# Patient Record
Sex: FEMALE | Race: BLACK OR AFRICAN AMERICAN | Employment: OTHER | URBAN - METROPOLITAN AREA
[De-identification: names, ages, dates, MRNs, and addresses within clinical notes are randomized per-mention and may not be internally consistent; named-entity substitution may affect disease eponyms.]

---

## 2017-06-30 RX ORDER — HYDROCHLOROTHIAZIDE 25 MG/1
TABLET ORAL
Qty: 30 TABLET | Refills: 3 | OUTPATIENT
Start: 2017-06-30

## 2017-07-02 ENCOUNTER — APPOINTMENT (OUTPATIENT)
Dept: GENERAL RADIOLOGY | Age: 77
End: 2017-07-02
Payer: MEDICARE

## 2017-07-02 ENCOUNTER — HOSPITAL ENCOUNTER (EMERGENCY)
Age: 77
Discharge: HOME | End: 2017-07-02
Attending: EMERGENCY MEDICINE
Payer: MEDICARE

## 2017-07-02 VITALS
HEIGHT: 64 IN | OXYGEN SATURATION: 97 % | RESPIRATION RATE: 18 BRPM | TEMPERATURE: 97 F | BODY MASS INDEX: 31.76 KG/M2 | SYSTOLIC BLOOD PRESSURE: 139 MMHG | HEART RATE: 74 BPM | DIASTOLIC BLOOD PRESSURE: 82 MMHG | WEIGHT: 186 LBS

## 2017-07-02 DIAGNOSIS — M62.838 MUSCLE SPASM: Primary | ICD-10-CM

## 2017-07-02 PROCEDURE — 99283 EMERGENCY DEPT VISIT LOW MDM: CPT

## 2017-07-02 PROCEDURE — 93005 ELECTROCARDIOGRAM TRACING: CPT

## 2017-07-02 PROCEDURE — 20552 NJX 1/MLT TRIGGER POINT 1/2: CPT

## 2017-07-02 PROCEDURE — 6370000000 HC RX 637 (ALT 250 FOR IP): Performed by: EMERGENCY MEDICINE

## 2017-07-02 PROCEDURE — 71020 XR CHEST STANDARD TWO VW: CPT

## 2017-07-02 RX ORDER — DIAZEPAM 5 MG/1
5 TABLET ORAL ONCE
Status: COMPLETED | OUTPATIENT
Start: 2017-07-02 | End: 2017-07-02

## 2017-07-02 RX ORDER — DIAZEPAM 5 MG/1
5 TABLET ORAL EVERY 8 HOURS PRN
Qty: 4 TABLET | Refills: 0 | Status: SHIPPED | OUTPATIENT
Start: 2017-07-02 | End: 2017-10-06

## 2017-07-02 RX ORDER — LIDOCAINE HYDROCHLORIDE 10 MG/ML
20 INJECTION, SOLUTION INFILTRATION; PERINEURAL ONCE
Status: DISCONTINUED | OUTPATIENT
Start: 2017-07-02 | End: 2017-07-02 | Stop reason: HOSPADM

## 2017-07-02 RX ADMIN — DIAZEPAM 5 MG: 5 TABLET ORAL at 12:53

## 2017-07-02 ASSESSMENT — ENCOUNTER SYMPTOMS
COUGH: 0
SHORTNESS OF BREATH: 0
WHEEZING: 0
NAUSEA: 0
SORE THROAT: 0
DIARRHEA: 0
RHINORRHEA: 0
VOMITING: 0
BACK PAIN: 1
CONSTIPATION: 0
ABDOMINAL DISTENTION: 0

## 2017-07-02 ASSESSMENT — PAIN SCALES - GENERAL: PAINLEVEL_OUTOF10: 5

## 2017-07-02 ASSESSMENT — PAIN DESCRIPTION - ORIENTATION: ORIENTATION: RIGHT;UPPER

## 2017-07-02 ASSESSMENT — PAIN DESCRIPTION - PAIN TYPE: TYPE: ACUTE PAIN

## 2017-07-02 ASSESSMENT — PAIN DESCRIPTION - LOCATION: LOCATION: BACK

## 2017-07-02 ASSESSMENT — PAIN DESCRIPTION - DESCRIPTORS: DESCRIPTORS: SPASM

## 2017-07-02 ASSESSMENT — PAIN DESCRIPTION - FREQUENCY: FREQUENCY: CONTINUOUS

## 2017-07-11 LAB
EKG ATRIAL RATE: 88 BPM
EKG P AXIS: 53 DEGREES
EKG P-R INTERVAL: 146 MS
EKG Q-T INTERVAL: 382 MS
EKG QRS DURATION: 72 MS
EKG QTC CALCULATION (BAZETT): 462 MS
EKG R AXIS: -11 DEGREES
EKG T AXIS: 45 DEGREES
EKG VENTRICULAR RATE: 88 BPM

## 2017-10-06 ENCOUNTER — OFFICE VISIT (OUTPATIENT)
Dept: INTERNAL MEDICINE | Age: 77
End: 2017-10-06
Payer: MEDICARE

## 2017-10-06 ENCOUNTER — HOSPITAL ENCOUNTER (OUTPATIENT)
Age: 77
Setting detail: SPECIMEN
Discharge: HOME | End: 2017-10-06
Payer: MEDICARE

## 2017-10-06 VITALS
HEIGHT: 64 IN | WEIGHT: 201 LBS | DIASTOLIC BLOOD PRESSURE: 74 MMHG | BODY MASS INDEX: 34.31 KG/M2 | SYSTOLIC BLOOD PRESSURE: 161 MMHG | HEART RATE: 80 BPM

## 2017-10-06 DIAGNOSIS — E11.00 TYPE 2 DIABETES MELLITUS WITH HYPEROSMOLARITY NOT AT GOAL (HCC): Chronic | ICD-10-CM

## 2017-10-06 DIAGNOSIS — E11.00 TYPE 2 DIABETES MELLITUS WITH HYPEROSMOLARITY NOT AT GOAL (HCC): Primary | Chronic | ICD-10-CM

## 2017-10-06 DIAGNOSIS — Z13.820 OSTEOPOROSIS SCREENING: ICD-10-CM

## 2017-10-06 DIAGNOSIS — I15.9 SECONDARY HYPERTENSION: ICD-10-CM

## 2017-10-06 LAB
CHOLESTEROL/HDL RATIO: 4.5
CHOLESTEROL: 196 MG/DL
HBA1C MFR BLD: 8.3 %
HDLC SERPL-MCNC: 44 MG/DL
LDL CHOLESTEROL: 128 MG/DL (ref 0–130)
TRIGL SERPL-MCNC: 121 MG/DL
VLDLC SERPL CALC-MCNC: NORMAL MG/DL (ref 1–30)

## 2017-10-06 PROCEDURE — 83036 HEMOGLOBIN GLYCOSYLATED A1C: CPT | Performed by: INTERNAL MEDICINE

## 2017-10-06 PROCEDURE — 36415 COLL VENOUS BLD VENIPUNCTURE: CPT

## 2017-10-06 PROCEDURE — 80061 LIPID PANEL: CPT

## 2017-10-06 PROCEDURE — 99214 OFFICE O/P EST MOD 30 MIN: CPT | Performed by: INTERNAL MEDICINE

## 2017-10-06 ASSESSMENT — PATIENT HEALTH QUESTIONNAIRE - PHQ9
SUM OF ALL RESPONSES TO PHQ QUESTIONS 1-9: 0
2. FEELING DOWN, DEPRESSED OR HOPELESS: 0
10. IF YOU CHECKED OFF ANY PROBLEMS, HOW DIFFICULT HAVE THESE PROBLEMS MADE IT FOR YOU TO DO YOUR WORK, TAKE CARE OF THINGS AT HOME, OR GET ALONG WITH OTHER PEOPLE: 0
9. THOUGHTS THAT YOU WOULD BE BETTER OFF DEAD, OR OF HURTING YOURSELF: 0
SUM OF ALL RESPONSES TO PHQ9 QUESTIONS 1 & 2: 0
7. TROUBLE CONCENTRATING ON THINGS, SUCH AS READING THE NEWSPAPER OR WATCHING TELEVISION: 0
6. FEELING BAD ABOUT YOURSELF - OR THAT YOU ARE A FAILURE OR HAVE LET YOURSELF OR YOUR FAMILY DOWN: 0
3. TROUBLE FALLING OR STAYING ASLEEP: 0
8. MOVING OR SPEAKING SO SLOWLY THAT OTHER PEOPLE COULD HAVE NOTICED. OR THE OPPOSITE, BEING SO FIGETY OR RESTLESS THAT YOU HAVE BEEN MOVING AROUND A LOT MORE THAN USUAL: 0
5. POOR APPETITE OR OVEREATING: 0
4. FEELING TIRED OR HAVING LITTLE ENERGY: 0
1. LITTLE INTEREST OR PLEASURE IN DOING THINGS: 0

## 2017-10-06 NOTE — MR AVS SNAPSHOT
After Visit Summary             Torie Jeong   10/6/2017 9:20 AM   Office Visit    Description:  Female : 1940   Provider:  Chance Wahl MD   Department:  [facility] and Future Appointments         Below is a list of your follow-up and future appointments. This may not be a complete list as you may have made appointments directly with providers that we are not aware of or your providers may have made some for you. Please call your providers to confirm appointments. It is important to keep your appointments. Please bring your current insurance card, photo ID, co-pay, and all medication bottles to your appointment. If self-pay, payment is expected at the time of service. Your To-Do List     Future Appointments Provider Department Dept Phone    12/15/2017 8:55 AM MD MICHELLE Mcconnell 41 [phone]    Please arrive 15 minutes prior to appointment time, bring insurance card and photo ID. Future Orders Complete By Expires    DEXA BONE DENSITY 2 SITES [00261 Custom]  2017 10/6/2018    Lipid Panel [LAB18 Custom]  2017 10/6/2018    Follow-Up    Return in about 4 weeks (around 11/3/2017). Information from Your Visit        Department     Name Address Phone Fax    MICHELLE Hernandez [address] [facility] [phone]      You Were Seen for:         Comments    Type 2 diabetes mellitus with hyperosmolarity not at goal [facility])   [605398]         Vital Signs     Blood Pressure Pulse Height Weight Body Mass Index Smoking Status    161/74 (Site: Left Arm, Position: Sitting, Cuff Size: Large Adult) 80 5' 4\" (1.626 m) 201 lb (91.2 kg) 34.5 kg/m2 Never Smoker      Additional Information about your Body Mass Index (BMI)           Your BMI as listed above is considered obese (30 or more).  BMI is an estimate of body fat, calculated from your height and weight. The higher your BMI, the greater your risk of heart disease, high blood pressure, type 2 diabetes, stroke, gallstones, arthritis, sleep apnea, and certain cancers. BMI is not perfect. It may overestimate body fat in athletes and people who are more muscular. Even a small weight loss (between 5 and 10 percent of your current weight) by decreasing your calorie intake and becoming more physically active will help lower your risk of developing or worsening diseases associated with obesity. Learn more at: [employer]          Instructions    TESTING INSTRUCTIONS    Your doctor has ordered a/an DEXA for you, this will be done at any [facility] location of your choice    You will be called by the Scheduling Department to schedule your testing. If you do not hear from them within a week, please call them at [phone] to schedule the appointment. On the day of your appointment, please report to the Admitting Department, located on the main floor of the medical center behind the information desk. If you cannot keep this appointment, please call [phone] to cancel and reschedule your appointment. LABORATORY INSTRUCTIONS    Your doctor has ordered blood or urine testing. You can get this testing done at the Lab located on the first floor of the [facility], or at any other [facility]. Please stop at Main Registration, before going to the lab, as you must be registered first.     Please get this lab done before your next visit. You may eat or drink before this test.    Follow-up appointment scheduled for 12/15/17, AVS given to patient. It is very important that you keep your appointments, please contact us if you are unable to keep your scheduled appointment.  Thank you bp              Today's Medication Changes          These changes are accurate as of: 10/6/17 10:09 AM.  If you have any Yearly Flu Vaccine (1) 9/1/2017            MyChart Signup           [facility] allows you to send messages to your doctor, view your test results, renew your prescriptions, schedule appointments, view visit notes, and more. How Do I Sign Up? 1. In your Internet browser, go to [email or web address]  2. Click on the Sign Up Now link in the Sign In box. You will see the New Member Sign Up page. 3. Enter your [facility] [employer] Access Code exactly as it appears below. You will not need to use this code after youve completed the sign-up process. If you do not sign up before the expiration date, you must request a new code. [employer] Access Code: [email or web address]  Expires: 12/5/2017 10:09 AM    4. Enter your Social Security Number ([id number]) and Date of Birth (mm/dd/yyyy) as indicated and click Submit. You will be taken to the next sign-up page. 5. Create a [employer] ID. This will be your [employer] login ID and cannot be changed, so think of one that is secure and easy to remember. 6. Create a [employer] password. You can change your password at any time. 7. Enter your Password Reset Question and Answer. This can be used at a later time if you forget your password. 8. Enter your e-mail address. You will receive e-mail notification when new information is available in [address] 9. Click Sign Up. You can now view your medical record. Additional Information  If you have questions, please contact the physician practice where you receive care. Remember, [employer] is NOT to be used for urgent needs. For medical emergencies, dial 911. For questions regarding your [employer] account call [phone]. If you have a clinical question, please call your doctor's office.

## 2017-10-06 NOTE — PROGRESS NOTES
Visit Information    Have you changed or started any medications since your last visit including any over-the-counter medicines, vitamins, or herbal medicines? no   Are you having any side effects from any of your medications? -  no  Have you stopped taking any of your medications? Is so, why? -  no    Have you seen any other physician or provider since your last visit? No  Have you had any other diagnostic tests since your last visit? No  Have you been seen in the emergency room and/or had an admission to a hospital since we last saw you? No  Have you had your routine dental cleaning in the past 6 months? no    Have you activated your [employer] account? If not, what are your barriers?  Yes     Patient Care Team:  Jason Loving MD as PCP - General (Family Medicine)    Medical History Review  Past Medical, Family, and Social History reviewed and does contribute to the patient presenting condition    Health Maintenance   Topic Date Due    DTaP/Tdap/Td vaccine (1 - Tdap) 05/26/1957    Lipid screen  05/26/1978    Zostavax vaccine  05/26/1998    DEXA (modify frequency per FRAX score)  05/26/2003    Pneumococcal low/med risk (1 of 2 - PCV13) 05/26/2003    Flu vaccine (1) 09/01/2017

## 2017-10-06 NOTE — PROGRESS NOTES
Attending Physician Statement  I have discussed the care of Sindy Walls, including pertinent history and exam findings with the resident. I have reviewed the key elements of all parts of the encounter with the resident. I have seen and examined the patient with the resident and the key elements of all parts of the encounter have been performed by me. Added history includes DM, HTN- not sure of meds. Changing PCP today. I agree with the assessment, and status of the problem list as documented. 1. Type 2 diabetes mellitus with hyperosmolarity not at goal [facility])  Lipid Panel    POCT glycosylated hemoglobin (Hb A1C)   2. Secondary hypertension     3. Osteoporosis screening  DEXA BONE DENSITY 2 SITES   ' The plan and orders should include   Orders Placed This Encounter   Procedures    POCT glycosylated hemoglobin (Hb A1C)    and this was also documented by the resident. The medication list was reviewed with the resident and is up to date. The return visit should be in 1 month .     Otf Hinds MD, [address] [place] [address] Internal Medicine Associate & [facility]  Associate  Department of Internal Medicine  Internal Medicine Clerkship - Arsenio Loo  10/6/2017, 9:59 AM

## 2017-10-06 NOTE — PATIENT INSTRUCTIONS
TESTING INSTRUCTIONS    Your doctor has ordered a/an DEXA for you, this will be done at any [address] location of your choice    You will be called by the Scheduling Department to schedule your testing. If you do not hear from them within a week, please call them at [phone] to schedule the appointment. On the day of your appointment, please report to the Admitting Department, located on the main floor of the medical center behind the information desk. If you cannot keep this appointment, please call [phone] to cancel and reschedule your appointment. LABORATORY INSTRUCTIONS    Your doctor has ordered blood or urine testing. You can get this testing done at the Lab located on the first floor of the [facility], or at any other [facility]. Please stop at Main Registration, before going to the lab, as you must be registered first.     Please get this lab done before your next visit. You may eat or drink before this test.    Follow-up appointment scheduled for 12/15/17, AVS given to patient. It is very important that you keep your appointments, please contact us if you are unable to keep your scheduled appointment.  Thank you bp

## 2017-10-06 NOTE — PROGRESS NOTES
[facility]    New Patient Note/History and Physical    Date of patient's visit: 10/6/2017    Name:  Mitali Belcher      YOB: 1940    Patient Care Team:  Aurelia Simmons MD as PCP - General (Family Medicine)    REASON FOR VISIT: First Visit, establish care     HISTORY OF PRESENTING ILLNESS:      History was obtained from the patient. Mitali Belcher is a 76 y.o. is here to establish care. Previous PCP - dr Saul Rice at [place] [address] in insurance, needs to re-establish care  Being treated HTN for DM2  States she is on 4 pills, unsure of exact dose and which meds    HTN   - elevated 160s, 140s  - on lisinopril 10, hctz 25, norvasc 10? - pt unsure of exact medcations    DM2  - monitors glucose every AM - runs 150s  - last a1c - 11.2 from 9/2016  - on metformin 500 bid  - a1c this visit 8.3    Health care maintenance  - had flu shot (september)  - states she had pna vaccine previously  - had shingles vaccine - 6 years ago       aspirin 81 mg Take 81 mg by mouth daily.  hydroCHLOROthiazide (HYDRODIURIL) 25 mg    lisinopril (PRINIVIL,ZESTRIL) 10 mg    metFORMIN (GLUCOPHAGE) 500 mg tablet BID  - norvasc 10 mg          PAST MEDICAL AND SURGICAL HISTORY:          Diagnosis Date    Arthritis     Diabetes mellitus ([address]     Hypertension     Hypokalemia with normal acid-base balance 9/13/2016    Type 2 diabetes mellitus with hyperosmolarity not at goal [facility]) 9/13/2016           Procedure Laterality Date    HYSTERECTOMY         SOCIAL HISTORY:    TOBACCO:   reports that she has never smoked. She has never used smokeless tobacco.  ETOH:   reports that she does not drink alcohol. DRUGS:  reports that she does not use illicit drugs.   OCCUPATION:      ALLERGIES:      Allergies   Allergen Reactions    Codeine          HOME MEDICATION:      Current Outpatient Prescriptions on File Prior to Visit   Medication Sig Dispense Refill    aspirin 81 MG tablet Take 81 mg

## 2017-10-17 ENCOUNTER — APPOINTMENT (OUTPATIENT)
Dept: CT IMAGING | Age: 77
End: 2017-10-17
Payer: COMMERCIAL

## 2017-10-17 ENCOUNTER — APPOINTMENT (OUTPATIENT)
Dept: GENERAL RADIOLOGY | Age: 77
End: 2017-10-17
Payer: COMMERCIAL

## 2017-10-17 ENCOUNTER — HOSPITAL ENCOUNTER (EMERGENCY)
Age: 77
Discharge: HOME | End: 2017-10-17
Attending: EMERGENCY MEDICINE
Payer: COMMERCIAL

## 2017-10-17 VITALS
HEART RATE: 87 BPM | BODY MASS INDEX: 34.5 KG/M2 | SYSTOLIC BLOOD PRESSURE: 174 MMHG | WEIGHT: 201 LBS | DIASTOLIC BLOOD PRESSURE: 98 MMHG | OXYGEN SATURATION: 98 % | TEMPERATURE: 98.7 F | RESPIRATION RATE: 16 BRPM
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
## 2017-10-17 DIAGNOSIS — M54.2 NECK PAIN: ICD-10-CM

## 2017-10-17 DIAGNOSIS — S39.012A STRAIN OF LUMBAR REGION, INITIAL ENCOUNTER: ICD-10-CM

## 2017-10-17 PROCEDURE — 72125 CT NECK SPINE W/O DYE: CPT

## 2017-10-17 PROCEDURE — 72100 X-RAY EXAM L-S SPINE 2/3 VWS: CPT

## 2017-10-17 PROCEDURE — 6370000000 HC RX 637 (ALT 250 FOR IP): Performed by: STUDENT IN AN ORGANIZED HEALTH CARE EDUCATION/TRAINING PROGRAM

## 2017-10-17 PROCEDURE — G0383 LEV 4 HOSP TYPE B ED VISIT: HCPCS

## 2017-10-17 RX ORDER — LIDOCAINE 50 MG/G
1 PATCH TOPICAL DAILY
Qty: 30 PATCH | Refills: 0 | Status: SHIPPED | OUTPATIENT
Start: 2017-10-17 | End: 2019-12-03 | Stop reason: ALTCHOICE

## 2017-10-17 RX ORDER — LIDOCAINE 50 MG/G
1 PATCH TOPICAL DAILY
Status: DISCONTINUED | OUTPATIENT
Start: 2017-10-17 | End: 2017-10-17 | Stop reason: HOSPADM

## 2017-10-17 RX ORDER — CYCLOBENZAPRINE HCL 10 MG
10 TABLET ORAL 3 TIMES DAILY PRN
Qty: 10 TABLET | Refills: 0 | Status: SHIPPED | OUTPATIENT
Start: 2017-10-17 | End: 2017-10-27

## 2017-10-17 RX ORDER — CYCLOBENZAPRINE HCL 10 MG
10 TABLET ORAL ONCE
Status: COMPLETED | OUTPATIENT
Start: 2017-10-17 | End: 2017-10-17

## 2017-10-17 RX ADMIN — CYCLOBENZAPRINE 10 MG: 10 TABLET, FILM COATED ORAL at 13:23

## 2017-10-17 ASSESSMENT — ENCOUNTER SYMPTOMS
SORE THROAT: 0
RHINORRHEA: 0
NAUSEA: 0
BACK PAIN: 1
SHORTNESS OF BREATH: 0
ABDOMINAL PAIN: 0
COUGH: 0
BLOOD IN STOOL: 0
ABDOMINAL DISTENTION: 0
WHEEZING: 0
PHOTOPHOBIA: 0
VOMITING: 0

## 2017-10-17 ASSESSMENT — PAIN DESCRIPTION - DESCRIPTORS: DESCRIPTORS: ACHING

## 2017-10-17 ASSESSMENT — PAIN SCALES - GENERAL: PAINLEVEL_OUTOF10: 6

## 2017-10-17 ASSESSMENT — PAIN DESCRIPTION - ONSET: ONSET: SUDDEN

## 2017-10-17 ASSESSMENT — PAIN DESCRIPTION - PAIN TYPE: TYPE: ACUTE PAIN

## 2017-10-17 ASSESSMENT — PAIN DESCRIPTION - LOCATION: LOCATION: BACK;NECK

## 2017-10-17 ASSESSMENT — PAIN DESCRIPTION - PROGRESSION: CLINICAL_PROGRESSION: GRADUALLY WORSENING

## 2017-10-17 NOTE — ED PROVIDER NOTES
 Sexual activity: Not on file     Other Topics Concern    Not on file     Social History Narrative    No narrative on file       History reviewed. No pertinent family history. Allergies:  Codeine    Home Medications:  Prior to Admission medications    Medication Sig Start Date End Date Taking? Authorizing Provider   cyclobenzaprine (FLEXERIL) 10 MG tablet Take 1 tablet by mouth 3 times daily as needed for Muscle spasms 10/17/17 10/27/17 Yes Modesto Ward DO   lidocaine (LIDODERM) 5 % Place 1 patch onto the skin daily 12 hours on, 12 hours off. 10/17/17  Yes Modesto Ward DO   aspirin 81 MG tablet Take 81 mg by mouth daily    Historical Provider, MD   lisinopril (PRINIVIL;ZESTRIL) 10 MG tablet Take 1 tablet by mouth daily 9/14/16   Petra Bowman DO   hydrochlorothiazide (HYDRODIURIL) 25 MG tablet Take 1 tablet by mouth daily 9/14/16   Petra Bowman DO   metFORMIN (GLUCOPHAGE) 500 MG tablet Take 1 tablet by mouth 2 times daily (with meals) 9/14/16   Petra Bowman DO       REVIEW OF SYSTEMS    (2-9 systems for level 4, 10 or more for level 5)      Review of Systems   Constitutional: Negative for appetite change, chills, diaphoresis, fatigue, fever and unexpected weight change. HENT: Negative for congestion, rhinorrhea and sore throat. Eyes: Negative for photophobia. Respiratory: Negative for cough, shortness of breath and wheezing. Cardiovascular: Negative for chest pain, palpitations and leg swelling. Gastrointestinal: Negative for abdominal distention, abdominal pain, blood in stool, nausea and vomiting. Genitourinary: Negative for difficulty urinating, dysuria, flank pain and hematuria. Musculoskeletal: Positive for back pain and neck pain. Negative for myalgias. Neurological: Negative for dizziness, syncope, weakness, numbness and headaches. Psychiatric/Behavioral: Negative for confusion.        PHYSICAL EXAM   (up to 7 for level 4, 8 or more for level 5)      INITIAL completed with a voice recognition program.  Efforts were made to edit the dictations but occasionally words are mis-transcribed.)       Sonny Spencer,   Resident  10/17/17 3122
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
## 2017-10-18 NOTE — TELEPHONE ENCOUNTER
E-scribing request for lisinopril . Pt has future appt.      Health Maintenance   Topic Date Due    DTaP/Tdap/Td vaccine (1 - Tdap) 05/26/1957    Zostavax vaccine  05/26/1998    DEXA (modify frequency per FRAX score)  05/26/2003    Pneumococcal low/med risk (1 of 2 - PCV13) 05/26/2003    Flu vaccine (1) 09/01/2017    Lipid screen  10/06/2022       Hemoglobin A1C (%)   Date Value   10/06/2017 8.3   09/12/2016 12.3 (H)             ( goal A1C is < 7)   No results found for: LABMICR  LDL Cholesterol (mg/dL)   Date Value   10/06/2017 128       (goal LDL is <100)   BUN (mg/dL)   Date Value   09/21/2016 18     BP Readings from Last 3 Encounters:   10/17/17 (!) 174/98   10/06/17 (!) 161/74   07/02/17 139/82          (goal 120/80)      Next Visit Date:  12/15/2017    Patient Active Problem List:     Type 2 diabetes mellitus with hyperosmolarity not at goal [facility])     Secondary hypertension

## 2017-10-19 RX ORDER — LISINOPRIL 10 MG/1
TABLET ORAL
Qty: 30 TABLET | Refills: 0 | Status: SHIPPED | OUTPATIENT
Start: 2017-10-19 | End: 2017-12-15

## 2017-12-15 ENCOUNTER — OFFICE VISIT (OUTPATIENT)
Dept: INTERNAL MEDICINE | Age: 77
End: 2017-12-15
Payer: MEDICARE

## 2017-12-15 VITALS
HEART RATE: 74 BPM | SYSTOLIC BLOOD PRESSURE: 170 MMHG | DIASTOLIC BLOOD PRESSURE: 88 MMHG | WEIGHT: 199.2 LBS | BODY MASS INDEX: 34.19 KG/M2

## 2017-12-15 DIAGNOSIS — E11.00 TYPE 2 DIABETES MELLITUS WITH HYPEROSMOLARITY NOT AT GOAL (HCC): Chronic | ICD-10-CM

## 2017-12-15 DIAGNOSIS — I10 ESSENTIAL HYPERTENSION: Primary | ICD-10-CM

## 2017-12-15 DIAGNOSIS — Z23 NEED FOR PROPHYLACTIC VACCINATION WITH TETANUS-DIPHTHERIA (TD): ICD-10-CM

## 2017-12-15 DIAGNOSIS — Z23 NEED FOR PROPHYLACTIC VACCINATION AGAINST STREPTOCOCCUS PNEUMONIAE (PNEUMOCOCCUS): ICD-10-CM

## 2017-12-15 DIAGNOSIS — Z78.0 POST-MENOPAUSAL: ICD-10-CM

## 2017-12-15 PROCEDURE — 4040F PNEUMOC VAC/ADMIN/RCVD: CPT | Performed by: INTERNAL MEDICINE

## 2017-12-15 PROCEDURE — G0009 ADMIN PNEUMOCOCCAL VACCINE: HCPCS | Performed by: INTERNAL MEDICINE

## 2017-12-15 PROCEDURE — G8400 PT W/DXA NO RESULTS DOC: HCPCS | Performed by: INTERNAL MEDICINE

## 2017-12-15 PROCEDURE — 1036F TOBACCO NON-USER: CPT | Performed by: INTERNAL MEDICINE

## 2017-12-15 PROCEDURE — G8417 CALC BMI ABV UP PARAM F/U: HCPCS | Performed by: INTERNAL MEDICINE

## 2017-12-15 PROCEDURE — 1090F PRES/ABSN URINE INCON ASSESS: CPT | Performed by: INTERNAL MEDICINE

## 2017-12-15 PROCEDURE — 99213 OFFICE O/P EST LOW 20 MIN: CPT | Performed by: INTERNAL MEDICINE

## 2017-12-15 PROCEDURE — 90670 PCV13 VACCINE IM: CPT | Performed by: INTERNAL MEDICINE

## 2017-12-15 PROCEDURE — G8427 DOCREV CUR MEDS BY ELIG CLIN: HCPCS | Performed by: INTERNAL MEDICINE

## 2017-12-15 PROCEDURE — 1123F ACP DISCUSS/DSCN MKR DOCD: CPT | Performed by: INTERNAL MEDICINE

## 2017-12-15 PROCEDURE — G8484 FLU IMMUNIZE NO ADMIN: HCPCS | Performed by: INTERNAL MEDICINE

## 2017-12-15 RX ORDER — TETANUS AND DIPHTHERIA TOXOIDS ADSORBED 2; 2 [LF]/.5ML; [LF]/.5ML
0.5 INJECTION INTRAMUSCULAR ONCE
Qty: 0.5 ML | Refills: 0 | Status: SHIPPED | OUTPATIENT
Start: 2017-12-15 | End: 2017-12-15

## 2017-12-15 RX ORDER — LISINOPRIL AND HYDROCHLOROTHIAZIDE 25; 20 MG/1; MG/1
1 TABLET ORAL DAILY
Qty: 30 TABLET | Refills: 3 | Status: SHIPPED | OUTPATIENT
Start: 2017-12-15 | End: 2018-04-20 | Stop reason: SDUPTHER

## 2017-12-15 RX ORDER — LISINOPRIL 20 MG/1
TABLET ORAL
Qty: 30 TABLET | Refills: 3 | Status: CANCELLED | OUTPATIENT
Start: 2017-12-15

## 2017-12-15 RX ORDER — LANOLIN ALCOHOL/MO/W.PET/CERES
1000 CREAM (GRAM) TOPICAL DAILY
COMMUNITY
End: 2019-11-04 | Stop reason: ALTCHOICE

## 2017-12-15 RX ORDER — HYDROCHLOROTHIAZIDE 25 MG/1
25 TABLET ORAL DAILY
Qty: 30 TABLET | Refills: 3 | Status: CANCELLED | OUTPATIENT
Start: 2017-12-15

## 2017-12-15 RX ORDER — AMLODIPINE BESYLATE 10 MG/1
TABLET ORAL
Qty: 30 TABLET | Refills: 5 | Status: SHIPPED | OUTPATIENT
Start: 2017-12-15 | End: 2018-04-20 | Stop reason: SDUPTHER

## 2017-12-15 NOTE — PROGRESS NOTES
Attending Physician Statement GE  I have discussed the care of Wendie Vargas, including pertinent history and exam findings with the resident. I have reviewed the key elements of all parts of the encounter with the resident. DM- A1C-8.3 -10/16-on metformin  Htn- continue Norvasc 10 mg. DC Lisinopril, HCTZ. Start Prinzide 20-25  [place] flu shot elsewhere  DEXA  prevnar 15  Script for TDAP    Return in about 4 weeks (around 1/12/2018).       Sunny Bryson MD

## 2017-12-15 NOTE — PROGRESS NOTES
[facility]    Progress Note    Date of patient's visit: 12/15/2017    Patient's Name:  Mitali Belcher    YOB: 1940            Patient Care Team:  Mumtaz Shay MD as PCP - General (Internal Medicine)    REASON FOR VISIT: Routine outpatient follow     Chief Complaint   Patient presents with    Hypertension     4 week follow up    Diabetes     4 week follow up        HISTORY OF PRESENT ILLNESS:      History was obtained from the patient. Mitali Belcher is a 76 y.o. is here for regular follow up.        HTN - uncontrolled this visit 170s/160s  - on lisinopril 10, hctz 25, norvasc 10     DM2  - monitors glucose every AM - runs 150s  - last a1c - 11.2 from 9/2016  - on metformin 500 bid  - a1c this visit 8.3     Health care maintenance  - had flu shot (september 2017)  - states she had pna vaccine previously  - had shingles vaccine - 6 years ago         Patient Active Problem List   Diagnosis    Type 2 diabetes mellitus with hyperosmolarity not at goal [facility])    Secondary hypertension       ALLERGIES      Allergies   Allergen Reactions    Codeine          MEDICATIONS:      Current Outpatient Prescriptions   Medication Sig Dispense Refill    vitamin B-12 (CYANOCOBALAMIN) 1000 MCG tablet Take 1,000 mcg by mouth daily      diptheria-tetanus toxoids (DECAVAC) 2-2 LF/0.5ML injection Inject 0.5 mLs into the muscle once for 1 dose 0.5 mL 0    amLODIPine (NORVASC) 10 MG tablet take 1 tablet by mouth once daily 30 tablet 5    metFORMIN (GLUCOPHAGE) 500 MG tablet Take 1 tablet by mouth 2 times daily (with meals) 60 tablet 3    lisinopril-hydrochlorothiazide (PRINZIDE;ZESTORETIC) 20-25 MG per tablet Take 1 tablet by mouth daily 30 tablet 3    aspirin 81 MG tablet Take 81 mg by mouth daily      lidocaine (LIDODERM) 5 % Place 1 patch onto the skin daily 12 hours on, 12 hours off. 30 patch 0     No current facility-administered medications for this visit.         SOCIAL

## 2017-12-15 NOTE — PROGRESS NOTES
Visit Information    Have you changed or started any medications since your last visit including any over-the-counter medicines, vitamins, or herbal medicines? no   Are you having any side effects from any of your medications? -  no  Have you stopped taking any of your medications? Is so, why? -  no    Have you seen any other physician or provider since your last visit? No  Have you had any other diagnostic tests since your last visit? No  Have you been seen in the emergency room and/or had an admission to a hospital since we last saw you? No  Have you had your routine dental cleaning in the past 6 months? no    Have you activated your [employer] account? If not, what are your barriers?  Yes     Patient Care Team:  Abilio Cunningham MD as PCP - General (Internal Medicine)    Medical History Review  Past Medical, Family, and Social History reviewed and does contribute to the patient presenting condition    Health Maintenance   Topic Date Due    DEXA (modify frequency per FRAX score)  05/26/2003    DTaP/Tdap/Td vaccine (1 - Tdap) 01/28/2006    Pneumococcal low/med risk (2 of 2 - PCV13) 09/01/2016    Flu vaccine (1) 09/01/2017    Lipid screen  10/06/2022    Zostavax vaccine  Completed

## 2018-01-26 ENCOUNTER — OFFICE VISIT (OUTPATIENT)
Dept: INTERNAL MEDICINE | Age: 78
End: 2018-01-26
Payer: MEDICARE

## 2018-01-26 ENCOUNTER — HOSPITAL ENCOUNTER (OUTPATIENT)
Age: 78
Setting detail: SPECIMEN
Discharge: HOME | End: 2018-01-26
Payer: MEDICARE

## 2018-01-26 VITALS
SYSTOLIC BLOOD PRESSURE: 138 MMHG | HEIGHT: 64 IN | WEIGHT: 200 LBS | BODY MASS INDEX: 34.15 KG/M2 | DIASTOLIC BLOOD PRESSURE: 70 MMHG | HEART RATE: 86 BPM

## 2018-01-26 DIAGNOSIS — E11.8 TYPE 2 DIABETES MELLITUS WITH COMPLICATION, WITHOUT LONG-TERM CURRENT USE OF INSULIN (HCC): ICD-10-CM

## 2018-01-26 DIAGNOSIS — E13.319 DIABETIC RETINOPATHY ASSOCIATED WITH DIABETES MELLITUS OF OTHER TYPE, MACULAR EDEMA PRESENCE UNSPECIFIED, UNSPECIFIED LATERALITY, UNSPECIFIED RETINOPATHY SEVERITY (HCC): ICD-10-CM

## 2018-01-26 DIAGNOSIS — I10 BENIGN HYPERTENSION: ICD-10-CM

## 2018-01-26 DIAGNOSIS — I10 BENIGN HYPERTENSION: Primary | ICD-10-CM

## 2018-01-26 PROBLEM — M15.9 GENERALIZED OSTEOARTHRITIS: Status: ACTIVE | Noted: 2017-07-24

## 2018-01-26 LAB
ANION GAP SERPL CALCULATED.3IONS-SCNC: 16 MMOL/L (ref 9–17)
BUN BLDV-MCNC: 14 MG/DL (ref 8–23)
BUN/CREAT BLD: ABNORMAL (ref 9–20)
CALCIUM SERPL-MCNC: 9.3 MG/DL (ref 8.6–10.4)
CHLORIDE BLD-SCNC: 102 MMOL/L (ref 98–107)
CO2: 23 MMOL/L (ref 20–31)
CREAT SERPL-MCNC: 0.78 MG/DL (ref 0.5–0.9)
ESTIMATED AVERAGE GLUCOSE: 217 MG/DL
GFR AFRICAN AMERICAN: >60 ML/MIN
GFR NON-AFRICAN AMERICAN: >60 ML/MIN
GFR SERPL CREATININE-BSD FRML MDRD: ABNORMAL ML/MIN/{1.73_M2}
GFR SERPL CREATININE-BSD FRML MDRD: ABNORMAL ML/MIN/{1.73_M2}
GLUCOSE BLD-MCNC: 284 MG/DL (ref 70–99)
HBA1C MFR BLD: 9.2 % (ref 4–6)
POTASSIUM SERPL-SCNC: 4.1 MMOL/L (ref 3.7–5.3)
SODIUM BLD-SCNC: 141 MMOL/L (ref 135–144)

## 2018-01-26 PROCEDURE — G8484 FLU IMMUNIZE NO ADMIN: HCPCS | Performed by: INTERNAL MEDICINE

## 2018-01-26 PROCEDURE — 36415 COLL VENOUS BLD VENIPUNCTURE: CPT

## 2018-01-26 PROCEDURE — 83036 HEMOGLOBIN GLYCOSYLATED A1C: CPT

## 2018-01-26 PROCEDURE — 80048 BASIC METABOLIC PNL TOTAL CA: CPT

## 2018-01-26 PROCEDURE — 99213 OFFICE O/P EST LOW 20 MIN: CPT | Performed by: INTERNAL MEDICINE

## 2018-01-26 PROCEDURE — G8400 PT W/DXA NO RESULTS DOC: HCPCS | Performed by: INTERNAL MEDICINE

## 2018-01-26 PROCEDURE — 1036F TOBACCO NON-USER: CPT | Performed by: INTERNAL MEDICINE

## 2018-01-26 PROCEDURE — G8417 CALC BMI ABV UP PARAM F/U: HCPCS | Performed by: INTERNAL MEDICINE

## 2018-01-26 PROCEDURE — G8427 DOCREV CUR MEDS BY ELIG CLIN: HCPCS | Performed by: INTERNAL MEDICINE

## 2018-01-26 PROCEDURE — 1090F PRES/ABSN URINE INCON ASSESS: CPT | Performed by: INTERNAL MEDICINE

## 2018-01-26 PROCEDURE — 4040F PNEUMOC VAC/ADMIN/RCVD: CPT | Performed by: INTERNAL MEDICINE

## 2018-01-26 PROCEDURE — 1123F ACP DISCUSS/DSCN MKR DOCD: CPT | Performed by: INTERNAL MEDICINE

## 2018-01-26 NOTE — PATIENT INSTRUCTIONS
Follow-up appointment scheduled for 4/20/18, AVS given to patient. Labs given to patient, they will have them done before their next visit.      [address]

## 2018-01-26 NOTE — PROGRESS NOTES
Chronic Disease Visit Information    BP Readings from Last 3 Encounters:   12/15/17 (!) 170/88   10/17/17 (!) 174/98   10/06/17 (!) 161/74          Hemoglobin A1C (%)   Date Value   10/06/2017 8.3   09/12/2016 12.3 (H)     LDL Cholesterol (mg/dL)   Date Value   10/06/2017 128     HDL (mg/dL)   Date Value   10/06/2017 44     BUN (mg/dL)   Date Value   09/21/2016 18     CREATININE (mg/dL)   Date Value   09/21/2016 0.77     Glucose (mg/dL)   Date Value   09/21/2016 101 (H)            Have you changed or started any medications since your last visit including any over-the-counter medicines, vitamins, or herbal medicines? no   Are you having any side effects from any of your medications? -  no  Have you stopped taking any of your medications? Is so, why? -  no    Have you seen any other physician or provider since your last visit? No  Have you had any other diagnostic tests since your last visit? No  Have you been seen in the emergency room and/or had an admission to a hospital since we last saw you? No  Have you had your annual diabetic retinal (eye) exam? No  Have you had your routine dental cleaning in the past 6 months? no    Have you activated your [employer] account? If not, what are your barriers?  Yes     Patient Care Team:  Teryl Fleischer, MD as PCP - General (Internal Medicine)         Medical History Review  Past Medical, Family, and Social History reviewed and does not contribute to the patient presenting condition    Health Maintenance   Topic Date Due    DEXA (modify frequency per FRAX score)  05/26/2003    DTaP/Tdap/Td vaccine (1 - Tdap) 01/28/2006    Flu vaccine (1) 09/01/2017    Potassium monitoring  09/21/2017    Creatinine monitoring  09/21/2017    Lipid screen  10/06/2022    Zostavax vaccine  Completed    Pneumococcal low/med risk  Completed

## 2018-01-26 NOTE — PROGRESS NOTES
[facility]    Progress Note    Date of patient's visit: 1/26/2018    Patient's Name:  Marilyn Mark    YOB: 1940            Patient Care Team:  Rachel Good MD as PCP - General (Internal Medicine)    REASON FOR VISIT: Routine outpatient follow     Chief Complaint   Patient presents with    Hypertension     4 week follow up    Diabetes    Health Maintenance     labs due, Flu due, Dexa Bone Scan due, reprinted. Flu done at pharmacy record requested       HISTORY OF PRESENT ILLNESS:      History was obtained from the patient. Marilyn Mark is a 76 y.o. is here for regular follow up.        HTN - controlled this visit  - on lisinopril-hctz 20-25, norvasc 10     DM2  - monitors glucose every AM - runs 130-140  - last a1c - 8.3 from 10/2017  - on metformin 500 bid     Health care maintenance  - had flu shot (september 2017)  - states she had pna vaccine previously  - had shingles vaccine - 6 years ago         Patient Active Problem List   Diagnosis    Type 2 diabetes mellitus with complication, without long-term current use of insulin ([address]    Secondary hypertension    Astigmatism, regular    Background diabetic retinopathy ([address]    Benign hypertension    Eczema    Generalized osteoarthritis    Hyperlipidemia    Hypertensive retinopathy of both eyes    Nonsmoker    Pseudophakia    Systolic murmur    Temporary cerebral vascular dysfunction       ALLERGIES      Allergies   Allergen Reactions    Codeine          MEDICATIONS:      Current Outpatient Prescriptions   Medication Sig Dispense Refill    vitamin B-12 (CYANOCOBALAMIN) 1000 MCG tablet Take 1,000 mcg by mouth daily      amLODIPine (NORVASC) 10 MG tablet take 1 tablet by mouth once daily 30 tablet 5    metFORMIN (GLUCOPHAGE) 500 MG tablet Take 1 tablet by mouth 2 times daily (with meals) 60 tablet 3    lisinopril-hydrochlorothiazide (PRINZIDE;ZESTORETIC) 20-25 MG per tablet Take 1 tablet by mouth daily 30 tablet 3    aspirin 81 MG tablet Take 81 mg by mouth daily      lidocaine (LIDODERM) 5 % Place 1 patch onto the skin daily 12 hours on, 12 hours off. 30 patch 0     No current facility-administered medications for this visit. SOCIAL HISTORY    Reviewed and no change from previous record. Ginny  reports that she has never smoked.  She has never used smokeless tobacco.    FAMILY HISTORY:    Reviewed and No change from previous visit    HEALTH MAINTENANCE DUE:      Health Maintenance Due   Topic Date Due    DEXA (modify frequency per FRAX score)  05/26/2003    DTaP/Tdap/Td vaccine (1 - Tdap) 01/28/2006    Flu vaccine (1) 09/01/2017    Potassium monitoring  09/21/2017    Creatinine monitoring  09/21/2017       REVIEW OF SYSTEMS:      CONSTITUTIONAL:  negative for fevers, chills, fatigue and malaise    EYES:  negative for double vision, blurred vision and photophobia     HEENT:  negative for tinnitus, epistaxis and sore throat    RESPIRATORY:  negative for cough, shortness of breath, wheezing    CARDIOVASCULAR:  negative for chest pain, palpitations, syncope, edema    GASTROINTESTINAL:  negative for nausea, vomiting, diarrhea, constipation, abdominal pain    GENITOURINARY:  negative for incontinence    MUSCULOSKELETAL:  negative for neck or back pain    NEUROLOGICAL:  negative for tremor and dysphagia    PSYCHIATRIC:  negative for depressed mood, anxiety       PHYSICAL EXAM:      Vitals:    01/26/18 0817 01/26/18 0844   BP: (!) 146/90 138/70   Site: Right Arm    Position: Sitting    Cuff Size: Large Adult    Pulse: 86    Weight: 200 lb (90.7 kg)    Height: 5' 4\" (1.626 m)      BP Readings from Last 3 Encounters:   01/26/18 138/70   12/15/17 (!) 170/88   10/17/17 (!) 174/98        General appearance - alert, well appearing, and in no distress  Mental status - alert, oriented to person, place, and time  Eyes - pupils equal and reactive, extraocular eye movements intact  Nose - normal and patent, no erythema, discharge or polyps  Mouth - mucous membranes moist, pharynx normal without lesions  Neck - supple, no significant adenopathy  Chest - clear to auscultation, symmetric air entry  Heart - normal rate, regular rhythm, normal S1, S2  Abdomen - soft, nontender, nondistended  Neurological - alert, oriented, normal speech, no focal findings   Skin - normal coloration and turgor, no rashes      LABORATORY FINDINGS:    CBC:  Lab Results   Component Value Date    WBC 8.9 09/12/2016    HGB 14.9 09/12/2016     09/12/2016       BMP:    Lab Results   Component Value Date     09/21/2016    K 4.4 09/21/2016    CL 97 09/21/2016    CO2 23 09/21/2016    BUN 18 09/21/2016    CREATININE 0.77 09/21/2016    GLUCOSE 101 09/21/2016       HEMOGLOBIN A1C:   Lab Results   Component Value Date    LABA1C 8.3 10/06/2017       FASTING LIPID PANEL:  Lab Results   Component Value Date    CHOL 196 10/06/2017    HDL 44 10/06/2017    TRIG 121 10/06/2017         ASSESSMENT      Ginny was seen today for hypertension, diabetes and health maintenance. Diagnoses and all orders for this visit:    Benign hypertension  -     Basic Metabolic Panel; Future    Type 2 diabetes mellitus with complication, without long-term current use of insulin (HCC)  -     Hemoglobin A1C; Future          PLAN:      - continue BP meds  - Check BMP and A1c  - dexa scan  - had flu (9/2017), shingles, pna vaccine previously       FOLLOW UP:   Return in about 3 months (around 4/26/2018). INSTRUCTIONS:   1. Ginny received counseling on nutrition and exercise. 2. Discussed use, benefit, and side effects of prescribed medications. Barriers to medication compliance addressed. All patient questions answered. Pt voiced understanding. 3. Patient given educational materials - see patient instructions    Alvena Babinski, M.D.              Department of Internal Medicine  [facility]       1/26/2018, 8:49 AM

## 2018-02-14 ENCOUNTER — HOSPITAL ENCOUNTER (OUTPATIENT)
Dept: MAMMOGRAPHY | Age: 78
Discharge: HOME | End: 2018-02-16
Payer: MEDICARE

## 2018-02-14 DIAGNOSIS — Z78.0 POST-MENOPAUSAL: ICD-10-CM

## 2018-02-14 PROCEDURE — 77080 DXA BONE DENSITY AXIAL: CPT

## 2018-04-20 ENCOUNTER — OFFICE VISIT (OUTPATIENT)
Dept: INTERNAL MEDICINE | Age: 78
End: 2018-04-20
Payer: MEDICARE

## 2018-04-20 VITALS
WEIGHT: 200 LBS | SYSTOLIC BLOOD PRESSURE: 158 MMHG | BODY MASS INDEX: 34.15 KG/M2 | DIASTOLIC BLOOD PRESSURE: 92 MMHG | HEIGHT: 64 IN | HEART RATE: 70 BPM

## 2018-04-20 DIAGNOSIS — E11.00 TYPE 2 DIABETES MELLITUS WITH HYPEROSMOLARITY NOT AT GOAL (HCC): Chronic | ICD-10-CM

## 2018-04-20 DIAGNOSIS — M15.9 GENERALIZED OSTEOARTHRITIS: ICD-10-CM

## 2018-04-20 DIAGNOSIS — E11.8 TYPE 2 DIABETES MELLITUS WITH COMPLICATION, WITHOUT LONG-TERM CURRENT USE OF INSULIN (HCC): Primary | Chronic | ICD-10-CM

## 2018-04-20 DIAGNOSIS — I10 ESSENTIAL HYPERTENSION: ICD-10-CM

## 2018-04-20 LAB — HBA1C MFR BLD: 9.1 %

## 2018-04-20 PROCEDURE — 1090F PRES/ABSN URINE INCON ASSESS: CPT | Performed by: INTERNAL MEDICINE

## 2018-04-20 PROCEDURE — 4040F PNEUMOC VAC/ADMIN/RCVD: CPT | Performed by: INTERNAL MEDICINE

## 2018-04-20 PROCEDURE — 99213 OFFICE O/P EST LOW 20 MIN: CPT

## 2018-04-20 PROCEDURE — 83036 HEMOGLOBIN GLYCOSYLATED A1C: CPT | Performed by: INTERNAL MEDICINE

## 2018-04-20 PROCEDURE — 1036F TOBACCO NON-USER: CPT | Performed by: INTERNAL MEDICINE

## 2018-04-20 PROCEDURE — G8417 CALC BMI ABV UP PARAM F/U: HCPCS | Performed by: INTERNAL MEDICINE

## 2018-04-20 PROCEDURE — 1123F ACP DISCUSS/DSCN MKR DOCD: CPT | Performed by: INTERNAL MEDICINE

## 2018-04-20 PROCEDURE — G8427 DOCREV CUR MEDS BY ELIG CLIN: HCPCS | Performed by: INTERNAL MEDICINE

## 2018-04-20 PROCEDURE — G8399 PT W/DXA RESULTS DOCUMENT: HCPCS | Performed by: INTERNAL MEDICINE

## 2018-04-20 PROCEDURE — 99213 OFFICE O/P EST LOW 20 MIN: CPT | Performed by: INTERNAL MEDICINE

## 2018-04-20 RX ORDER — AMLODIPINE BESYLATE 10 MG/1
TABLET ORAL
Qty: 30 TABLET | Refills: 5 | Status: SHIPPED | OUTPATIENT
Start: 2018-04-20 | End: 2018-06-19 | Stop reason: SDUPTHER

## 2018-04-20 RX ORDER — LISINOPRIL AND HYDROCHLOROTHIAZIDE 25; 20 MG/1; MG/1
1 TABLET ORAL DAILY
Qty: 30 TABLET | Refills: 5 | Status: SHIPPED | OUTPATIENT
Start: 2018-04-20 | End: 2018-06-19 | Stop reason: SDUPTHER

## 2018-05-21 ENCOUNTER — OFFICE VISIT (OUTPATIENT)
Dept: INTERNAL MEDICINE | Age: 78
End: 2018-05-21
Payer: MEDICARE

## 2018-05-21 VITALS
SYSTOLIC BLOOD PRESSURE: 146 MMHG | HEART RATE: 81 BPM | DIASTOLIC BLOOD PRESSURE: 78 MMHG | HEIGHT: 64 IN | BODY MASS INDEX: 34.15 KG/M2 | WEIGHT: 200 LBS

## 2018-05-21 DIAGNOSIS — E11.65 UNCONTROLLED TYPE 2 DIABETES MELLITUS WITH HYPERGLYCEMIA, WITHOUT LONG-TERM CURRENT USE OF INSULIN (HCC): Primary | ICD-10-CM

## 2018-05-21 DIAGNOSIS — I10 ESSENTIAL HYPERTENSION: ICD-10-CM

## 2018-05-21 DIAGNOSIS — G89.29 CHRONIC BILATERAL LOW BACK PAIN WITHOUT SCIATICA: ICD-10-CM

## 2018-05-21 DIAGNOSIS — E66.09 CLASS 1 OBESITY DUE TO EXCESS CALORIES WITH SERIOUS COMORBIDITY AND BODY MASS INDEX (BMI) OF 34.0 TO 34.9 IN ADULT: ICD-10-CM

## 2018-05-21 DIAGNOSIS — E78.00 PURE HYPERCHOLESTEROLEMIA: ICD-10-CM

## 2018-05-21 DIAGNOSIS — M54.50 CHRONIC BILATERAL LOW BACK PAIN WITHOUT SCIATICA: ICD-10-CM

## 2018-05-21 PROCEDURE — G8417 CALC BMI ABV UP PARAM F/U: HCPCS | Performed by: INTERNAL MEDICINE

## 2018-05-21 PROCEDURE — 4040F PNEUMOC VAC/ADMIN/RCVD: CPT | Performed by: INTERNAL MEDICINE

## 2018-05-21 PROCEDURE — G8427 DOCREV CUR MEDS BY ELIG CLIN: HCPCS | Performed by: INTERNAL MEDICINE

## 2018-05-21 PROCEDURE — 99214 OFFICE O/P EST MOD 30 MIN: CPT | Performed by: INTERNAL MEDICINE

## 2018-05-21 PROCEDURE — G8399 PT W/DXA RESULTS DOCUMENT: HCPCS | Performed by: INTERNAL MEDICINE

## 2018-05-21 PROCEDURE — 1036F TOBACCO NON-USER: CPT | Performed by: INTERNAL MEDICINE

## 2018-05-21 PROCEDURE — 1123F ACP DISCUSS/DSCN MKR DOCD: CPT | Performed by: INTERNAL MEDICINE

## 2018-05-21 PROCEDURE — 1090F PRES/ABSN URINE INCON ASSESS: CPT | Performed by: INTERNAL MEDICINE

## 2018-05-21 PROCEDURE — 99212 OFFICE O/P EST SF 10 MIN: CPT | Performed by: INTERNAL MEDICINE

## 2018-05-21 RX ORDER — ATORVASTATIN CALCIUM 40 MG/1
40 TABLET, FILM COATED ORAL DAILY
Qty: 30 TABLET | Refills: 3 | Status: SHIPPED | OUTPATIENT
Start: 2018-05-21 | End: 2018-09-10 | Stop reason: SDUPTHER

## 2018-05-21 RX ORDER — CARVEDILOL 6.25 MG/1
6.25 TABLET ORAL 2 TIMES DAILY
Qty: 60 TABLET | Refills: 3 | Status: SHIPPED | OUTPATIENT
Start: 2018-05-21 | End: 2018-06-19 | Stop reason: SDUPTHER

## 2018-05-21 ASSESSMENT — ENCOUNTER SYMPTOMS
CONSTIPATION: 0
BLOOD IN STOOL: 0
HEARTBURN: 0
BACK PAIN: 1
SHORTNESS OF BREATH: 0
NAUSEA: 0
COUGH: 0
ABDOMINAL PAIN: 0
EYE REDNESS: 0
BLURRED VISION: 0
SPUTUM PRODUCTION: 0

## 2018-06-19 ENCOUNTER — HOSPITAL ENCOUNTER (OUTPATIENT)
Age: 78
Setting detail: SPECIMEN
Discharge: HOME | End: 2018-06-19
Payer: MEDICARE

## 2018-06-19 ENCOUNTER — OFFICE VISIT (OUTPATIENT)
Dept: INTERNAL MEDICINE | Age: 78
End: 2018-06-19
Payer: MEDICARE

## 2018-06-19 VITALS
WEIGHT: 201 LBS | BODY MASS INDEX: 34.31 KG/M2 | DIASTOLIC BLOOD PRESSURE: 90 MMHG | HEIGHT: 64 IN | SYSTOLIC BLOOD PRESSURE: 152 MMHG | HEART RATE: 63 BPM

## 2018-06-19 DIAGNOSIS — E78.00 PURE HYPERCHOLESTEROLEMIA: ICD-10-CM

## 2018-06-19 DIAGNOSIS — E11.3299 BACKGROUND DIABETIC RETINOPATHY (HCC): ICD-10-CM

## 2018-06-19 DIAGNOSIS — E66.09 CLASS 1 OBESITY DUE TO EXCESS CALORIES WITH SERIOUS COMORBIDITY AND BODY MASS INDEX (BMI) OF 34.0 TO 34.9 IN ADULT: ICD-10-CM

## 2018-06-19 DIAGNOSIS — I10 UNCONTROLLED HYPERTENSION: ICD-10-CM

## 2018-06-19 LAB
CREATININE URINE: 113.7 MG/DL (ref 28–217)
GLUCOSE BLD-MCNC: 160 MG/DL
MICROALBUMIN/CREAT 24H UR: <12 MG/L
MICROALBUMIN/CREAT UR-RTO: NORMAL MCG/MG CREAT

## 2018-06-19 PROCEDURE — 1036F TOBACCO NON-USER: CPT | Performed by: INTERNAL MEDICINE

## 2018-06-19 PROCEDURE — 99214 OFFICE O/P EST MOD 30 MIN: CPT | Performed by: INTERNAL MEDICINE

## 2018-06-19 PROCEDURE — G8427 DOCREV CUR MEDS BY ELIG CLIN: HCPCS | Performed by: INTERNAL MEDICINE

## 2018-06-19 PROCEDURE — G8399 PT W/DXA RESULTS DOCUMENT: HCPCS | Performed by: INTERNAL MEDICINE

## 2018-06-19 PROCEDURE — 4040F PNEUMOC VAC/ADMIN/RCVD: CPT | Performed by: INTERNAL MEDICINE

## 2018-06-19 PROCEDURE — G8417 CALC BMI ABV UP PARAM F/U: HCPCS | Performed by: INTERNAL MEDICINE

## 2018-06-19 PROCEDURE — 1090F PRES/ABSN URINE INCON ASSESS: CPT | Performed by: INTERNAL MEDICINE

## 2018-06-19 PROCEDURE — 1123F ACP DISCUSS/DSCN MKR DOCD: CPT | Performed by: INTERNAL MEDICINE

## 2018-06-19 PROCEDURE — 82962 GLUCOSE BLOOD TEST: CPT | Performed by: INTERNAL MEDICINE

## 2018-06-19 RX ORDER — HYDRALAZINE HYDROCHLORIDE 25 MG/1
25 TABLET, FILM COATED ORAL 3 TIMES DAILY
Qty: 90 TABLET | Refills: 3 | Status: SHIPPED | OUTPATIENT
Start: 2018-06-19 | End: 2018-09-10 | Stop reason: SDUPTHER

## 2018-06-19 RX ORDER — LISINOPRIL AND HYDROCHLOROTHIAZIDE 25; 20 MG/1; MG/1
1 TABLET ORAL DAILY
Qty: 30 TABLET | Refills: 5 | Status: SHIPPED | OUTPATIENT
Start: 2018-06-19 | End: 2019-01-02 | Stop reason: SDUPTHER

## 2018-06-19 RX ORDER — CARVEDILOL 6.25 MG/1
6.25 TABLET ORAL 2 TIMES DAILY
Qty: 60 TABLET | Refills: 5 | Status: SHIPPED | OUTPATIENT
Start: 2018-06-19 | End: 2018-09-19 | Stop reason: SDUPTHER

## 2018-06-19 RX ORDER — AMLODIPINE BESYLATE 10 MG/1
TABLET ORAL
Qty: 30 TABLET | Refills: 5 | Status: SHIPPED | OUTPATIENT
Start: 2018-06-19 | End: 2018-09-19 | Stop reason: SDUPTHER

## 2018-09-10 ENCOUNTER — OFFICE VISIT (OUTPATIENT)
Dept: INTERNAL MEDICINE | Age: 78
End: 2018-09-10
Payer: MEDICARE

## 2018-09-10 VITALS
BODY MASS INDEX: 33.92 KG/M2 | DIASTOLIC BLOOD PRESSURE: 104 MMHG | HEART RATE: 72 BPM | WEIGHT: 197.6 LBS | SYSTOLIC BLOOD PRESSURE: 170 MMHG

## 2018-09-10 DIAGNOSIS — I10 UNCONTROLLED HYPERTENSION: ICD-10-CM

## 2018-09-10 DIAGNOSIS — Z23 NEED FOR PROPHYLACTIC VACCINATION AGAINST DIPHTHERIA-TETANUS-PERTUSSIS (DTP): ICD-10-CM

## 2018-09-10 DIAGNOSIS — M54.50 CHRONIC BILATERAL LOW BACK PAIN WITHOUT SCIATICA: ICD-10-CM

## 2018-09-10 DIAGNOSIS — G89.29 CHRONIC BILATERAL LOW BACK PAIN WITHOUT SCIATICA: ICD-10-CM

## 2018-09-10 DIAGNOSIS — E11.65 UNCONTROLLED TYPE 2 DIABETES MELLITUS WITH HYPERGLYCEMIA, WITHOUT LONG-TERM CURRENT USE OF INSULIN (HCC): Primary | ICD-10-CM

## 2018-09-10 DIAGNOSIS — Z23 NEED FOR PROPHYLACTIC VACCINATION AND INOCULATION AGAINST VARICELLA: ICD-10-CM

## 2018-09-10 DIAGNOSIS — E78.00 PURE HYPERCHOLESTEROLEMIA: ICD-10-CM

## 2018-09-10 PROCEDURE — G8427 DOCREV CUR MEDS BY ELIG CLIN: HCPCS | Performed by: INTERNAL MEDICINE

## 2018-09-10 PROCEDURE — 1101F PT FALLS ASSESS-DOCD LE1/YR: CPT | Performed by: INTERNAL MEDICINE

## 2018-09-10 PROCEDURE — G8399 PT W/DXA RESULTS DOCUMENT: HCPCS | Performed by: INTERNAL MEDICINE

## 2018-09-10 PROCEDURE — 1036F TOBACCO NON-USER: CPT | Performed by: INTERNAL MEDICINE

## 2018-09-10 PROCEDURE — 99214 OFFICE O/P EST MOD 30 MIN: CPT | Performed by: INTERNAL MEDICINE

## 2018-09-10 PROCEDURE — G8417 CALC BMI ABV UP PARAM F/U: HCPCS | Performed by: INTERNAL MEDICINE

## 2018-09-10 PROCEDURE — 1090F PRES/ABSN URINE INCON ASSESS: CPT | Performed by: INTERNAL MEDICINE

## 2018-09-10 PROCEDURE — 4040F PNEUMOC VAC/ADMIN/RCVD: CPT | Performed by: INTERNAL MEDICINE

## 2018-09-10 PROCEDURE — 1123F ACP DISCUSS/DSCN MKR DOCD: CPT | Performed by: INTERNAL MEDICINE

## 2018-09-10 PROCEDURE — 99211 OFF/OP EST MAY X REQ PHY/QHP: CPT | Performed by: INTERNAL MEDICINE

## 2018-09-10 RX ORDER — IBUPROFEN 800 MG/1
800 TABLET ORAL EVERY 8 HOURS PRN
Qty: 30 TABLET | Refills: 1 | Status: SHIPPED | OUTPATIENT
Start: 2018-09-10 | End: 2019-10-02 | Stop reason: SDUPTHER

## 2018-09-10 RX ORDER — HYDRALAZINE HYDROCHLORIDE 25 MG/1
25 TABLET, FILM COATED ORAL 3 TIMES DAILY
Qty: 90 TABLET | Refills: 3 | Status: SHIPPED | OUTPATIENT
Start: 2018-09-10 | End: 2020-10-08

## 2018-09-10 RX ORDER — ATORVASTATIN CALCIUM 40 MG/1
40 TABLET, FILM COATED ORAL DAILY
Qty: 90 TABLET | Refills: 3 | Status: SHIPPED | OUTPATIENT
Start: 2018-09-10 | End: 2019-01-02 | Stop reason: SDUPTHER

## 2018-09-10 ASSESSMENT — ENCOUNTER SYMPTOMS
SHORTNESS OF BREATH: 0
BLURRED VISION: 1
SPUTUM PRODUCTION: 0
COUGH: 0
BACK PAIN: 1
EYE REDNESS: 0
CONSTIPATION: 0
ABDOMINAL PAIN: 0
NAUSEA: 0

## 2018-09-10 NOTE — PROGRESS NOTES
[facility]    Progress Note    Date of patient's visit: 9/10/2018    Patient's Name:  Sim James    YOB: 1940            Patient Care Team:  Adwoa Cano MD as PCP - General (Internal Medicine)    REASON FOR VISIT: Routine outpatient follow     Chief Complaint   Patient presents with    Diabetes     1 month f/u    Hypertension     1 month f/u    Health Maintenance     vaccines pending    Back Pain     pt states she had cortisone shot that helped with back pain for years, pt would like to discuss options to help with back pain         HISTORY OF PRESENT ILLNESS:    History was obtained from the patient. Sim James is a [de-identified] y.o. is here for Follow-up on her uncontrolled hypertension and diabetes. She brought a few of her medication bottles. She states she takes amended every day but she is missing several medications. She agrees to have a home nurse come and help set her her med box. She has several children and grandchildren but she says they're busy to help her. She has chronic low back pain. She says pain sometimes radiates down her legs. No falls. She agrees to physical therapy. She had at one point had a cortisone injection and agrees to have that done if physical therapy does not help. Lumbar xray     FINDINGS:   Lumbar vertebral body height and alignment is maintained.  Facet degenerative   changes are seen within the lower lumbar spine with possible L5 pars defects. No significant disc space narrowing.           Impression   Lumbar spine degenerative changes without acute radiographic findings.          Past Medical History:   Diagnosis Date    Arthritis     Diabetes mellitus ([address]     Hyperlipidemia 11/9/2016    Hypertension     Hypokalemia with normal acid-base balance 9/13/2016    Type 2 diabetes mellitus with hyperosmolarity not at goal [facility]) 9/13/2016       Past Surgical History:   Procedure Laterality Date    sputum production and shortness of breath. Cardiovascular: Negative for chest pain, palpitations and leg swelling. Gastrointestinal: Negative for abdominal pain, constipation and nausea. Genitourinary: Positive for urgency. Negative for dysuria and frequency. Musculoskeletal: Positive for back pain. Skin: Negative for itching and rash. Neurological: Negative for dizziness, sensory change, focal weakness, loss of consciousness and headaches. Endo/Heme/Allergies: Negative for polydipsia. Does not bruise/bleed easily. Psychiatric/Behavioral: Negative for depression. The patient does not have insomnia. PHYSICAL EXAM:      Vitals:    09/10/18 0821 09/10/18 0833   BP: (!) 148/74 (!) 170/104   Site: Right Upper Arm Right Upper Arm   Position: Sitting Sitting   Cuff Size: Large Adult Large Adult   Pulse: 72    Weight: 197 lb 9.6 oz (89.6 kg)      Body mass index is 33.92 kg/m². BP Readings from Last 3 Encounters:   09/10/18 (!) 170/104   06/19/18 (!) 152/90   05/21/18 (!) 146/78        Wt Readings from Last 3 Encounters:   09/10/18 197 lb 9.6 oz (89.6 kg)   06/19/18 201 lb (91.2 kg)   05/21/18 200 lb (90.7 kg)       Physical Exam    HENT:  Normocephalic, Atraumatic,  Neck- Normal range of motion, No tenderness, Supple  Eyes:  PERRL, EOMI, Conjunctiva normal, No discharge. Respiratory:  Normal breath sounds, No respiratory distress, No wheezing, No chest tenderness. Cardiovascular:  Normal heart rate, Normal rhythm, No murmurs  GI:  Bowel sounds normal, Soft, No tenderness, No masses   :   No CVA tenderness. Musculoskeletal:  Intact distal pulses, No edema, No tenderness, Back- No tenderness. Decreased ROM of lumbar spine  Integument:  Warm, Dry, No erythema, No rash. Lymphatic:  No lymphadenopathy noted. Neurologic:  Alert & oriented x 3, Normal motor function, Normal sensory function, No focal deficits noted.    Psychiatric:  Affect normal    LABORATORY FINDINGS:    CBC:  Lab Results Component Value Date    WBC 8.9 09/12/2016    HGB 14.9 09/12/2016     09/12/2016     BMP:    Lab Results   Component Value Date     01/26/2018    K 4.1 01/26/2018     01/26/2018    CO2 23 01/26/2018    BUN 14 01/26/2018    CREATININE 0.78 01/26/2018    GLUCOSE 160 06/19/2018     HEMOGLOBIN A1C:   Lab Results   Component Value Date    LABA1C 9.1 04/20/2018     MICROALBUMIN URINE:   Lab Results   Component Value Date    MICROALBUR <12 06/19/2018     FASTING LIPID PANEL:  Lab Results   Component Value Date    CHOL 196 10/06/2017    HDL 44 10/06/2017    TRIG 121 10/06/2017     Lab Results   Component Value Date    LDLCHOLESTEROL 128 10/06/2017       LIVER PROFILE:No results found for: ALT, AST, PROT, BILITOT, BILIDIR, LABALBU   THYROID FUNCTION: No results found for: TSH   URINE ANALYSIS: No results found for: LABURIN  ASSESSMENT AND PLAN:    1. Uncontrolled type 2 diabetes mellitus with hyperglycemia, without long-term current use of insulin (HCC)  Needs meds set up at home  Seems to have difficultly managing meds  Several missing pills  Will get home care     - atorvastatin (LIPITOR) 40 MG tablet; Take 1 tablet by mouth daily  Dispense: 90 tablet; Refill: 3    2. Uncontrolled hypertension  Get med set up  Follow up in few weeks      - [address]  - hydrALAZINE (APRESOLINE) 25 MG tablet; Take 1 tablet by mouth 3 times daily  Dispense: 90 tablet; Refill: 3    3. Pure hypercholesterolemia    - atorvastatin (LIPITOR) 40 MG tablet; Take 1 tablet by mouth daily  Dispense: 90 tablet; Refill: 3    4. Chronic bilateral low back pain without sciatica    - [address]  - ibuprofen (ADVIL;MOTRIN) 800 MG tablet; Take 1 tablet by mouth every 8 hours as needed for Pain  Dispense: 30 tablet; Refill: 1    5. Need for prophylactic vaccination against diphtheria-tetanus-pertussis (DTP)    - Tdap (ADACEL) 5-2-15.5 LF-MCG/0.5 injection;  Inject 0.5 mLs into the

## 2018-09-19 ENCOUNTER — OFFICE VISIT (OUTPATIENT)
Dept: INTERNAL MEDICINE | Age: 78
End: 2018-09-19
Payer: MEDICARE

## 2018-09-19 VITALS
DIASTOLIC BLOOD PRESSURE: 76 MMHG | SYSTOLIC BLOOD PRESSURE: 138 MMHG | WEIGHT: 195 LBS | HEIGHT: 64 IN | BODY MASS INDEX: 33.29 KG/M2 | HEART RATE: 70 BPM

## 2018-09-19 DIAGNOSIS — E78.00 PURE HYPERCHOLESTEROLEMIA: ICD-10-CM

## 2018-09-19 DIAGNOSIS — E66.09 CLASS 1 OBESITY DUE TO EXCESS CALORIES WITH SERIOUS COMORBIDITY AND BODY MASS INDEX (BMI) OF 33.0 TO 33.9 IN ADULT: ICD-10-CM

## 2018-09-19 DIAGNOSIS — I10 UNCONTROLLED HYPERTENSION: ICD-10-CM

## 2018-09-19 DIAGNOSIS — E11.65 UNCONTROLLED TYPE 2 DIABETES MELLITUS WITH HYPERGLYCEMIA, WITHOUT LONG-TERM CURRENT USE OF INSULIN (HCC): Primary | ICD-10-CM

## 2018-09-19 LAB — HBA1C MFR BLD: 8 %

## 2018-09-19 PROCEDURE — 1036F TOBACCO NON-USER: CPT | Performed by: INTERNAL MEDICINE

## 2018-09-19 PROCEDURE — 4040F PNEUMOC VAC/ADMIN/RCVD: CPT | Performed by: INTERNAL MEDICINE

## 2018-09-19 PROCEDURE — G8427 DOCREV CUR MEDS BY ELIG CLIN: HCPCS | Performed by: INTERNAL MEDICINE

## 2018-09-19 PROCEDURE — 1123F ACP DISCUSS/DSCN MKR DOCD: CPT | Performed by: INTERNAL MEDICINE

## 2018-09-19 PROCEDURE — 1090F PRES/ABSN URINE INCON ASSESS: CPT | Performed by: INTERNAL MEDICINE

## 2018-09-19 PROCEDURE — G8417 CALC BMI ABV UP PARAM F/U: HCPCS | Performed by: INTERNAL MEDICINE

## 2018-09-19 PROCEDURE — 83036 HEMOGLOBIN GLYCOSYLATED A1C: CPT | Performed by: INTERNAL MEDICINE

## 2018-09-19 PROCEDURE — 99211 OFF/OP EST MAY X REQ PHY/QHP: CPT | Performed by: INTERNAL MEDICINE

## 2018-09-19 PROCEDURE — 99213 OFFICE O/P EST LOW 20 MIN: CPT | Performed by: INTERNAL MEDICINE

## 2018-09-19 PROCEDURE — 1101F PT FALLS ASSESS-DOCD LE1/YR: CPT | Performed by: INTERNAL MEDICINE

## 2018-09-19 PROCEDURE — G8399 PT W/DXA RESULTS DOCUMENT: HCPCS | Performed by: INTERNAL MEDICINE

## 2018-09-19 RX ORDER — AMLODIPINE BESYLATE 10 MG/1
TABLET ORAL
Qty: 30 TABLET | Refills: 5 | Status: SHIPPED | OUTPATIENT
Start: 2018-09-19 | End: 2019-06-30 | Stop reason: SDUPTHER

## 2018-09-19 RX ORDER — CARVEDILOL 6.25 MG/1
6.25 TABLET ORAL 2 TIMES DAILY
Qty: 60 TABLET | Refills: 5 | Status: SHIPPED | OUTPATIENT
Start: 2018-09-19 | End: 2021-01-14 | Stop reason: ALTCHOICE

## 2018-09-19 ASSESSMENT — PATIENT HEALTH QUESTIONNAIRE - PHQ9
SUM OF ALL RESPONSES TO PHQ9 QUESTIONS 1 & 2: 0
2. FEELING DOWN, DEPRESSED OR HOPELESS: 0
SUM OF ALL RESPONSES TO PHQ QUESTIONS 1-9: 0
1. LITTLE INTEREST OR PLEASURE IN DOING THINGS: 0
SUM OF ALL RESPONSES TO PHQ QUESTIONS 1-9: 0

## 2018-09-19 NOTE — PATIENT INSTRUCTIONS
Return To Clinic 12/19/2018. After Visit Summary  given and reviewed. It is very important for your care that you keep your appointment. If for some reason you are unable to keep your appointment it is equally important that you call our office at [phone] to cancel your appointment and reschedule. Failure to do so may result in your termination from our practice.  --Tess Terrell

## 2018-09-19 NOTE — PROGRESS NOTES
A1C:   Lab Results   Component Value Date    LABA1C 9.1 04/20/2018     MICROALBUMIN URINE:   Lab Results   Component Value Date    MICROALBUR <12 06/19/2018     FASTING LIPID PANEL:  Lab Results   Component Value Date    CHOL 196 10/06/2017    HDL 44 10/06/2017    TRIG 121 10/06/2017     Lab Results   Component Value Date    LDLCHOLESTEROL 128 10/06/2017       LIVER PROFILE:No results found for: ALT, AST, PROT, BILITOT, BILIDIR, LABALBU   THYROID FUNCTION: No results found for: TSH   URINE ANALYSIS: No results found for: LABURIN  ASSESSMENT AND PLAN:    1. Uncontrolled type 2 diabetes mellitus with hyperglycemia, without long-term current use of insulin (HCC)  Same meds  Advised compliance    - POCT glycosylated hemoglobin (Hb A1C)    2. Pure hypercholesterolemia  On lipitor    3. Uncontrolled hypertension    - carvedilol (COREG) 6.25 MG tablet; Take 1 tablet by mouth 2 times daily  Dispense: 60 tablet; Refill: 5  - amLODIPine (NORVASC) 10 MG tablet; take 1 tablet by mouth once daily  Dispense: 30 tablet; Refill: 5    4. Class 1 obesity due to excess calories with serious comorbidity and body mass index (BMI) of 33.0 to 33.9 in adult            FOLLOW UP AND INSTRUCTIONS:   Return in about 3 months (around 12/19/2018). 1. Ginny received counseling on the following healthy behaviors: nutrition, exercise and medication adherence    2. Reviewed prior labs and health maintenance. 3. Discussed use, benefit, and side effects of prescribed medications. Barriers to medication compliance addressed. All patient questions answered. Pt voiced understanding.      Petra Scott  Attending Physician, [address] [place] [address], Internal Medicine Residency Program  [address] [place] [address]  9/19/2018, 3:00 PM

## 2019-01-02 ENCOUNTER — OFFICE VISIT (OUTPATIENT)
Dept: INTERNAL MEDICINE | Age: 79
End: 2019-01-02
Payer: MEDICARE

## 2019-01-02 VITALS
SYSTOLIC BLOOD PRESSURE: 142 MMHG | WEIGHT: 206 LBS | DIASTOLIC BLOOD PRESSURE: 77 MMHG | HEIGHT: 64 IN | HEART RATE: 75 BPM | BODY MASS INDEX: 35.17 KG/M2

## 2019-01-02 DIAGNOSIS — E66.01 CLASS 2 SEVERE OBESITY DUE TO EXCESS CALORIES WITH SERIOUS COMORBIDITY AND BODY MASS INDEX (BMI) OF 35.0 TO 35.9 IN ADULT (HCC): ICD-10-CM

## 2019-01-02 DIAGNOSIS — E11.65 UNCONTROLLED TYPE 2 DIABETES MELLITUS WITH HYPERGLYCEMIA, WITHOUT LONG-TERM CURRENT USE OF INSULIN (HCC): Primary | ICD-10-CM

## 2019-01-02 DIAGNOSIS — I10 UNCONTROLLED HYPERTENSION: ICD-10-CM

## 2019-01-02 DIAGNOSIS — E78.00 PURE HYPERCHOLESTEROLEMIA: ICD-10-CM

## 2019-01-02 LAB — HBA1C MFR BLD: 8.5 %

## 2019-01-02 PROCEDURE — G8484 FLU IMMUNIZE NO ADMIN: HCPCS | Performed by: INTERNAL MEDICINE

## 2019-01-02 PROCEDURE — 4040F PNEUMOC VAC/ADMIN/RCVD: CPT | Performed by: INTERNAL MEDICINE

## 2019-01-02 PROCEDURE — 1123F ACP DISCUSS/DSCN MKR DOCD: CPT | Performed by: INTERNAL MEDICINE

## 2019-01-02 PROCEDURE — 1101F PT FALLS ASSESS-DOCD LE1/YR: CPT | Performed by: INTERNAL MEDICINE

## 2019-01-02 PROCEDURE — G8417 CALC BMI ABV UP PARAM F/U: HCPCS | Performed by: INTERNAL MEDICINE

## 2019-01-02 PROCEDURE — G8399 PT W/DXA RESULTS DOCUMENT: HCPCS | Performed by: INTERNAL MEDICINE

## 2019-01-02 PROCEDURE — 1036F TOBACCO NON-USER: CPT | Performed by: INTERNAL MEDICINE

## 2019-01-02 PROCEDURE — 99213 OFFICE O/P EST LOW 20 MIN: CPT | Performed by: INTERNAL MEDICINE

## 2019-01-02 PROCEDURE — 99211 OFF/OP EST MAY X REQ PHY/QHP: CPT | Performed by: INTERNAL MEDICINE

## 2019-01-02 PROCEDURE — 1090F PRES/ABSN URINE INCON ASSESS: CPT | Performed by: INTERNAL MEDICINE

## 2019-01-02 PROCEDURE — G8428 CUR MEDS NOT DOCUMENT: HCPCS | Performed by: INTERNAL MEDICINE

## 2019-01-02 PROCEDURE — 83036 HEMOGLOBIN GLYCOSYLATED A1C: CPT | Performed by: INTERNAL MEDICINE

## 2019-01-02 RX ORDER — AMLODIPINE BESYLATE 5 MG/1
5 TABLET ORAL DAILY
Qty: 30 TABLET | Refills: 3 | Status: SHIPPED | OUTPATIENT
Start: 2019-01-02 | End: 2019-07-06

## 2019-01-02 RX ORDER — ATORVASTATIN CALCIUM 40 MG/1
40 TABLET, FILM COATED ORAL DAILY
Qty: 90 TABLET | Refills: 3 | Status: SHIPPED | OUTPATIENT
Start: 2019-01-02 | End: 2019-10-28

## 2019-01-02 RX ORDER — LISINOPRIL AND HYDROCHLOROTHIAZIDE 25; 20 MG/1; MG/1
1 TABLET ORAL DAILY
Qty: 30 TABLET | Refills: 5 | Status: SHIPPED | OUTPATIENT
Start: 2019-01-02 | End: 2019-11-18 | Stop reason: SDUPTHER

## 2019-01-02 RX ORDER — GLIMEPIRIDE 1 MG/1
1 TABLET ORAL EVERY MORNING
Qty: 30 TABLET | Refills: 3 | Status: SHIPPED | OUTPATIENT
Start: 2019-01-02 | End: 2019-10-28

## 2019-01-02 ASSESSMENT — ENCOUNTER SYMPTOMS: DIARRHEA: 1

## 2019-01-06 ASSESSMENT — ENCOUNTER SYMPTOMS
COUGH: 0
RHINORRHEA: 0
WHEEZING: 0
ABDOMINAL PAIN: 0
CONSTIPATION: 0
BACK PAIN: 1
SORE THROAT: 0
SINUS PAIN: 0
PHOTOPHOBIA: 0
EYE REDNESS: 0
SHORTNESS OF BREATH: 0

## 2019-05-06 ENCOUNTER — OFFICE VISIT (OUTPATIENT)
Dept: INTERNAL MEDICINE | Age: 79
End: 2019-05-06
Payer: MEDICARE

## 2019-05-06 VITALS
HEART RATE: 65 BPM | DIASTOLIC BLOOD PRESSURE: 76 MMHG | HEIGHT: 64 IN | WEIGHT: 198 LBS | BODY MASS INDEX: 33.8 KG/M2 | SYSTOLIC BLOOD PRESSURE: 140 MMHG

## 2019-05-06 DIAGNOSIS — I10 BENIGN HYPERTENSION: ICD-10-CM

## 2019-05-06 DIAGNOSIS — E78.00 PURE HYPERCHOLESTEROLEMIA: ICD-10-CM

## 2019-05-06 DIAGNOSIS — E11.8 TYPE 2 DIABETES MELLITUS WITH COMPLICATION, WITHOUT LONG-TERM CURRENT USE OF INSULIN (HCC): Primary | Chronic | ICD-10-CM

## 2019-05-06 PROBLEM — I15.9 SECONDARY HYPERTENSION: Status: RESOLVED | Noted: 2017-10-06 | Resolved: 2019-05-06

## 2019-05-06 LAB — HBA1C MFR BLD: 8.7 %

## 2019-05-06 PROCEDURE — 1123F ACP DISCUSS/DSCN MKR DOCD: CPT | Performed by: INTERNAL MEDICINE

## 2019-05-06 PROCEDURE — 99213 OFFICE O/P EST LOW 20 MIN: CPT | Performed by: INTERNAL MEDICINE

## 2019-05-06 PROCEDURE — G8427 DOCREV CUR MEDS BY ELIG CLIN: HCPCS | Performed by: INTERNAL MEDICINE

## 2019-05-06 PROCEDURE — G8399 PT W/DXA RESULTS DOCUMENT: HCPCS | Performed by: INTERNAL MEDICINE

## 2019-05-06 PROCEDURE — 83036 HEMOGLOBIN GLYCOSYLATED A1C: CPT | Performed by: INTERNAL MEDICINE

## 2019-05-06 PROCEDURE — 99211 OFF/OP EST MAY X REQ PHY/QHP: CPT | Performed by: INTERNAL MEDICINE

## 2019-05-06 PROCEDURE — G8417 CALC BMI ABV UP PARAM F/U: HCPCS | Performed by: INTERNAL MEDICINE

## 2019-05-06 PROCEDURE — 1090F PRES/ABSN URINE INCON ASSESS: CPT | Performed by: INTERNAL MEDICINE

## 2019-05-06 PROCEDURE — 4040F PNEUMOC VAC/ADMIN/RCVD: CPT | Performed by: INTERNAL MEDICINE

## 2019-05-06 PROCEDURE — 1036F TOBACCO NON-USER: CPT | Performed by: INTERNAL MEDICINE

## 2019-05-06 ASSESSMENT — PATIENT HEALTH QUESTIONNAIRE - PHQ9
SUM OF ALL RESPONSES TO PHQ QUESTIONS 1-9: 0
SUM OF ALL RESPONSES TO PHQ9 QUESTIONS 1 & 2: 0
SUM OF ALL RESPONSES TO PHQ QUESTIONS 1-9: 0
1. LITTLE INTEREST OR PLEASURE IN DOING THINGS: 0
2. FEELING DOWN, DEPRESSED OR HOPELESS: 0

## 2019-05-06 NOTE — PROGRESS NOTES
Office Progress Note  Date of patient's visit: 5/6/2019  Patient's Name:  Sirena Salomon YOB: 1940            ================================================================    REASON FOR VISIT/CHIEF COMPLAINT:  Hypertension and Diabetes      HISTORY OF PRESENTING ILLNESS:  Patient is here to follow-up on  Diabetes mellitus-A1c today is 8.7 which is up from 8.5 on last visit. She is on metformin 850 mg, Amaryl 1 mg and per patient she is also on Januvia but she does not have her medication bottles today. Will obtain her medication list from the pharmacy. She does try to watch her diet and is completely independent with her ADLs    Hypertension-blood pressure is well controlled on amlodipine (both 10 mg and 5 mg on medication list, she is not sure which one she takes), Coreg 6.25 mg, hydralazine 25 mg 3 times daily, Prinzide 20-25  She is also on daily aspirin    Dyslipidemia-she is on Lipitor 40 mg    Patient is compliant to all her medications and has a pillbox.   She is doing well for the most part and denies any complaints today    She has never been a smoker      Current Outpatient Medications   Medication Sig Dispense Refill    metFORMIN (GLUCOPHAGE) 850 MG tablet Take 1 tablet by mouth 2 times daily (with meals) 180 tablet 1    glimepiride (AMARYL) 1 MG tablet Take 1 tablet by mouth every morning 30 tablet 3    lisinopril-hydrochlorothiazide (PRINZIDE;ZESTORETIC) 20-25 MG per tablet Take 1 tablet by mouth daily 30 tablet 5    atorvastatin (LIPITOR) 40 MG tablet Take 1 tablet by mouth daily 90 tablet 3    carvedilol (COREG) 6.25 MG tablet Take 1 tablet by mouth 2 times daily 60 tablet 5    ibuprofen (ADVIL;MOTRIN) 800 MG tablet Take 1 tablet by mouth every 8 hours as needed for Pain 30 tablet 1    hydrALAZINE (APRESOLINE) 25 MG tablet Take 1 tablet by mouth 3 times daily 90 tablet 3    vitamin B-12 (CYANOCOBALAMIN) 1000 MCG tablet Take 1,000 mcg by mouth daily      aspirin 81 MG tablet Take 81 mg by mouth daily      amLODIPine (NORVASC) 5 MG tablet Take 1 tablet by mouth daily 30 tablet 3    amLODIPine (NORVASC) 10 MG tablet take 1 tablet by mouth once daily 30 tablet 5    lidocaine (LIDODERM) 5 % Place 1 patch onto the skin daily 12 hours on, 12 hours off. 30 patch 0     No current facility-administered medications for this visit. REVIEW OF SYSTEMS:  HENT: Negative for nosebleeds. Respiratory: Negative for cough, shortness of breath, wheezing and stridor. Cardiovascular: Negative for chest pain, palpitations and leg swelling. Gastrointestinal: Negative for nausea, vomiting, abdominal pain, diarrhea and constipation. PHYSICAL EXAM:  Vitals:    05/06/19 0843   BP: (!) 140/76   Site: Right Upper Arm   Position: Sitting   Cuff Size: Large Adult   Pulse: 65   Weight: 198 lb (89.8 kg)   Height: 5' 4\" (1.626 m)       Constitutional: She is oriented to person, place, and time. She appears well-developed. No distress. Cardiovascular: Normal rate and regular rhythm. Pulmonary/Chest: Effort normal and breath sounds normal. No stridor. No respiratory distress. no wheezes. no rales. Abdominal: Soft. Bowel sounds are normal.  no distension. There is no tenderness. There is no rebound and no guarding. Musculoskeletal:  no edema and no tenderness. DIAGNOSTIC FINDINGS:  CBC:  Lab Results   Component Value Date    WBC 8.9 09/12/2016    HGB 14.9 09/12/2016     09/12/2016       BMP:    Lab Results   Component Value Date     01/26/2018    K 4.1 01/26/2018     01/26/2018    CO2 23 01/26/2018    BUN 14 01/26/2018    CREATININE 0.78 01/26/2018    GLUCOSE 160 06/19/2018       HEMOGLOBIN A1C:   Lab Results   Component Value Date    LABA1C 8.7 05/06/2019       FASTING LIPID PANEL:  Lab Results   Component Value Date    CHOL 196 10/06/2017    HDL 44 10/06/2017    TRIG 121 10/06/2017       ASSESSMENT :  1.  Type 2 diabetes mellitus with complication, without long-term current use of insulin ([facility]    2. Benign hypertension    3. Pure hypercholesterolemia        PLAN:  1. Continue current meds. Lifestyle modifications discussed  Will obtain medication list from the pharmacy  2. Continue current meds  3. Continue Lipitor        FOLLOW UP AND INSTRUCTIONS:  · Return in about 3 months (around 8/6/2019). Discussed use, benefit, and side effects of prescribed medications. Barriers to medication compliance addressed. All patient questions answered. Pt voiced understanding. [address] [place] [address] Internal Medicine Associate  5/6/2019, 1:16 PM    This note is created with the assistance of a speech-recognition program. While intending to generate a document that actually reflects the content of the visit, the document can still have some mistakes which may not have been identified and corrected by editing.

## 2019-05-06 NOTE — PROGRESS NOTES
Visit Information    Have you changed or started any medications since your last visit including any over-the-counter medicines, vitamins, or herbal medicines? no   Have you stopped taking any of your medications? Is so, why? -  no  Are you having any side effects from any of your medications? - no    Have you seen any other physician or provider since your last visit?  no   Have you had any other diagnostic tests since your last visit?  no   Have you been seen in the emergency room and/or had an admission in a hospital since we last saw you?  no   Have you had your routine dental cleaning in the past 6 months?  no     Do you have an active MyChart account? If no, what is the barrier?   No:     Patient Care Team:  Sayda Aranda MD as PCP - General (Internal Medicine)    Medical History Review  Past Medical, Family, and Social History reviewed and does not contribute to the patient presenting condition    Health Maintenance   Topic Date Due    DTaP/Tdap/Td vaccine (1 - Tdap) 01/28/2006    Shingles Vaccine (2 of 3) 02/26/2011    Potassium monitoring  01/26/2019    Creatinine monitoring  01/26/2019    DEXA (modify frequency per FRAX score)  Completed    Flu vaccine  Completed    Pneumococcal 65+ years Vaccine  Completed

## 2019-05-26 ENCOUNTER — HOSPITAL ENCOUNTER (EMERGENCY)
Age: 79
Discharge: HOME | End: 2019-05-26
Attending: EMERGENCY MEDICINE
Payer: MEDICARE

## 2019-05-26 ENCOUNTER — APPOINTMENT (OUTPATIENT)
Dept: GENERAL RADIOLOGY | Age: 79
End: 2019-05-26
Payer: MEDICARE

## 2019-05-26 VITALS
HEART RATE: 88 BPM | WEIGHT: 197 LBS | BODY MASS INDEX: 33.63 KG/M2 | RESPIRATION RATE: 18 BRPM | HEIGHT: 64 IN | TEMPERATURE: 98.1 F | DIASTOLIC BLOOD PRESSURE: 82 MMHG | OXYGEN SATURATION: 99 % | SYSTOLIC BLOOD PRESSURE: 175 MMHG

## 2019-05-26 DIAGNOSIS — J06.9 ACUTE UPPER RESPIRATORY INFECTION: Primary | ICD-10-CM

## 2019-05-26 LAB
ABSOLUTE EOS #: 0.48 K/UL (ref 0–0.44)
ABSOLUTE IMMATURE GRANULOCYTE: <0.03 K/UL (ref 0–0.3)
ABSOLUTE LYMPH #: 2.71 K/UL (ref 1.1–3.7)
ABSOLUTE MONO #: 0.53 K/UL (ref 0.1–1.2)
ANION GAP SERPL CALCULATED.3IONS-SCNC: 10 MMOL/L (ref 9–17)
BASOPHILS # BLD: 1 % (ref 0–2)
BASOPHILS ABSOLUTE: 0.05 K/UL (ref 0–0.2)
BUN BLDV-MCNC: 16 MG/DL (ref 8–23)
BUN/CREAT BLD: ABNORMAL (ref 9–20)
CALCIUM SERPL-MCNC: 9.4 MG/DL (ref 8.6–10.4)
CHLORIDE BLD-SCNC: 101 MMOL/L (ref 98–107)
CO2: 25 MMOL/L (ref 20–31)
CREAT SERPL-MCNC: 0.77 MG/DL (ref 0.5–0.9)
DIFFERENTIAL TYPE: ABNORMAL
DIRECT EXAM: NORMAL
EOSINOPHILS RELATIVE PERCENT: 7 % (ref 1–4)
GFR AFRICAN AMERICAN: >60 ML/MIN
GFR NON-AFRICAN AMERICAN: >60 ML/MIN
GFR SERPL CREATININE-BSD FRML MDRD: ABNORMAL ML/MIN/{1.73_M2}
GFR SERPL CREATININE-BSD FRML MDRD: ABNORMAL ML/MIN/{1.73_M2}
GLUCOSE BLD-MCNC: 212 MG/DL (ref 70–99)
HCT VFR BLD CALC: 39.7 % (ref 36.3–47.1)
HEMOGLOBIN: 12.5 G/DL (ref 11.9–15.1)
IMMATURE GRANULOCYTES: 0 %
LYMPHOCYTES # BLD: 38 % (ref 24–43)
Lab: NORMAL
MCH RBC QN AUTO: 28.3 PG (ref 25.2–33.5)
MCHC RBC AUTO-ENTMCNC: 31.5 G/DL (ref 28.4–34.8)
MCV RBC AUTO: 90 FL (ref 82.6–102.9)
MONOCYTES # BLD: 7 % (ref 3–12)
NRBC AUTOMATED: 0 PER 100 WBC
PDW BLD-RTO: 15.3 % (ref 11.8–14.4)
PLATELET # BLD: 262 K/UL (ref 138–453)
PLATELET ESTIMATE: ABNORMAL
PMV BLD AUTO: 11 FL (ref 8.1–13.5)
POTASSIUM SERPL-SCNC: 3.8 MMOL/L (ref 3.7–5.3)
RBC # BLD: 4.41 M/UL (ref 3.95–5.11)
RBC # BLD: ABNORMAL 10*6/UL
SEG NEUTROPHILS: 47 % (ref 36–65)
SEGMENTED NEUTROPHILS ABSOLUTE COUNT: 3.42 K/UL (ref 1.5–8.1)
SODIUM BLD-SCNC: 136 MMOL/L (ref 135–144)
SPECIMEN DESCRIPTION: NORMAL
TROPONIN INTERP: NORMAL
TROPONIN T: NORMAL NG/ML
TROPONIN, HIGH SENSITIVITY: 11 NG/L (ref 0–14)
WBC # BLD: 7.2 K/UL (ref 3.5–11.3)
WBC # BLD: ABNORMAL 10*3/UL

## 2019-05-26 PROCEDURE — 99285 EMERGENCY DEPT VISIT HI MDM: CPT

## 2019-05-26 PROCEDURE — 87804 INFLUENZA ASSAY W/OPTIC: CPT

## 2019-05-26 PROCEDURE — 80048 BASIC METABOLIC PNL TOTAL CA: CPT

## 2019-05-26 PROCEDURE — 71046 X-RAY EXAM CHEST 2 VIEWS: CPT

## 2019-05-26 PROCEDURE — 84484 ASSAY OF TROPONIN QUANT: CPT

## 2019-05-26 PROCEDURE — 93005 ELECTROCARDIOGRAM TRACING: CPT | Performed by: EMERGENCY MEDICINE

## 2019-05-26 PROCEDURE — 93005 ELECTROCARDIOGRAM TRACING: CPT

## 2019-05-26 PROCEDURE — 85025 COMPLETE CBC W/AUTO DIFF WBC: CPT

## 2019-05-26 RX ORDER — BENZONATATE 100 MG/1
100 CAPSULE ORAL 3 TIMES DAILY PRN
Qty: 30 CAPSULE | Refills: 0 | Status: SHIPPED | OUTPATIENT
Start: 2019-05-26 | End: 2019-06-02

## 2019-05-26 RX ORDER — IBUPROFEN 600 MG/1
600 TABLET ORAL EVERY 6 HOURS PRN
Qty: 30 TABLET | Refills: 0 | Status: SHIPPED | OUTPATIENT
Start: 2019-05-26 | End: 2019-10-02 | Stop reason: SDUPTHER

## 2019-05-26 ASSESSMENT — ENCOUNTER SYMPTOMS
VOMITING: 0
PHOTOPHOBIA: 0
COLOR CHANGE: 0
NAUSEA: 0
SHORTNESS OF BREATH: 1
CONSTIPATION: 0
TROUBLE SWALLOWING: 0
COUGH: 1
DIARRHEA: 0
SORE THROAT: 1
ABDOMINAL PAIN: 0
CHEST TIGHTNESS: 1

## 2019-05-26 NOTE — ED PROVIDER NOTES
[facility]     Emergency Department     Faculty Attestation        I performed a history and physical examination of the patient and discussed management with the resident. I reviewed the residents note and agree with the documented findings and plan of care. Any areas of disagreement are noted on the chart. I was personally present for the key portions of any procedures. I have documented in the chart those procedures where I was not present during the key portions. I have reviewed the emergency nurses triage note. I agree with the chief complaint, past medical history, past surgical history, allergies, medications, social and family history as documented unless otherwise noted below. For Physician Assistant/ Nurse Practitioner cases/documentation I have personally evaluated this patient and have completed at least one if not all key elements of the E/M (history, physical exam, and MDM). Additional findings are as noted. Vital Signs: BP: (!) 187/90  Pulse: 89  Resp: 18  Temp: 98.1 °F (36.7 °C) SpO2: 98 %  PCP:  Willard Lugo MD    Pertinent Comments:           EKG Interpretation    Interpreted by emergency department physician    Rhythm: normal sinus   Rate: normal at 81 bpm  Axis: normal  Conduction: normal  ST Segments: no acute change  T Waves: no acute change  Q Waves: no acute change    Clinical Impression:  nonspecific EKG. Critical Care  None      (Please note that portions of this note were completed with a voice recognition program. Efforts were made to edit the dictations but occasionally words are mis-transcribed.  Whenever words are used in this note in any gender, they shall be construed as though they were used in the gender appropriate to the circumstances; and whenever words are used in this note in the singular or plural form, they shall be construed as though they were used in the form appropriate to the circumstances.)    Rodolph Schaumann, MD Zigmund Belton  Attending Emergency Medicine Physician            Jose Parks MD  05/26/19 [id number]       Jose Parks MD  05/26/19 [id number]

## 2019-05-26 NOTE — ED PROVIDER NOTES
[address]  ED  Emergency Department Encounter  EmergencyMedicine Resident     Pt Name:Ginny Howell  MRN: [id number]  [place] 1940  Date of evaluation: 5/26/19  PCP:  Leeann Hogue MD    [address] [place] [address]       Chief Complaint   Patient presents with    Cough       HISTORY OF PRESENT ILLNESS  (Location/Symptom, Timing/Onset, Context/Setting, Quality, Duration, Modifying Factors, Severity.)      Dmitriy Sanders is a 78 y.o. female who presents with concern for worsening productive cough starting 2 days prior to arrival.  Patient notes increasing sputum production. Difficulty with sleep due to cough. Notes sore throat, tender on right side of the neck. No sick contacts. Denies fever, chills, skin changes, abdominal pain, nausea, vomiting. History of hypertension, diabetes, hyperlipidemia. Patient notes she has had some chest tightness with this. Denies history of cardiac event, notes history of stroke in 2016. Denies headache, numbness, tingling, paresthesias. Did take ibuprofen prior to arrival with mild improvement in symptoms. Compliant with all medications. PAST MEDICAL / SURGICAL / SOCIAL / FAMILY HISTORY      has a past medical history of Arthritis, Diabetes mellitus ([facility] [address] Hyperlipidemia, Hypertension, Hypokalemia with normal acid-base balance, and Type 2 diabetes mellitus with hyperosmolarity not at goal [facility]). has a past surgical history that includes Hysterectomy; Hysterectomy, total abdominal; Tubal ligation; and eye surgery.     Social History     Socioeconomic History    Marital status:      Spouse name: Not on file    Number of children: Not on file    Years of education: Not on file    Highest education level: Not on file   Occupational History    Not on file   Social Needs    Financial resource strain: Not on file    Food insecurity:     Worry: Not on file     Inability: Not on file    Transportation needs:     Medical: Not on file daily 1/2/19   Maged Nichols MD   atorvastatin (LIPITOR) 40 MG tablet Take 1 tablet by mouth daily 1/2/19   Maged Nichols MD   carvedilol (COREG) 6.25 MG tablet Take 1 tablet by mouth 2 times daily 9/19/18   Maged Nichols MD   amLODIPine (NORVASC) 10 MG tablet take 1 tablet by mouth once daily 9/19/18   Maged Nichols MD   ibuprofen (ADVIL;MOTRIN) 800 MG tablet Take 1 tablet by mouth every 8 hours as needed for Pain 9/10/18   Maged Nichols MD   hydrALAZINE (APRESOLINE) 25 MG tablet Take 1 tablet by mouth 3 times daily 9/10/18   Maged Nichols MD   vitamin B-12 (CYANOCOBALAMIN) 1000 MCG tablet Take 1,000 mcg by mouth daily    Historical Provider, MD   lidocaine (LIDODERM) 5 % Place 1 patch onto the skin daily 12 hours on, 12 hours off. 10/17/17   Corby Ramos,    aspirin 81 MG tablet Take 81 mg by mouth daily    Historical Provider, MD       REVIEW OF SYSTEMS    (2-9 systems for level 4, 10 or more for level 5)      Review of Systems   Constitutional: Negative for chills and fever. HENT: Positive for sore throat. Negative for nosebleeds and trouble swallowing. Eyes: Negative for photophobia and visual disturbance. Respiratory: Positive for cough, chest tightness and shortness of breath. Cardiovascular: Negative for chest pain and leg swelling. Gastrointestinal: Negative for abdominal pain, constipation, diarrhea, nausea and vomiting. Endocrine: Negative for polyuria. Genitourinary: Negative for difficulty urinating and frequency. Musculoskeletal: Negative for neck pain and neck stiffness. Skin: Negative for color change, rash and wound. Neurological: Negative for dizziness, weakness and numbness.        PHYSICAL EXAM   (up to 7 for level 4, 8 or more for level 5)      INITIAL VITALS:   BP (!) 187/90   Pulse 89   Temp 98.1 °F (36.7 °C)   Resp 18   Ht 5' 4\" (1.626 m)   Wt 197 lb (89.4 kg)   SpO2 98%   BMI 33.81 kg/m²     Physical Exam   Constitutional: She is oriented to person, place, and time. She appears well-developed and well-nourished. No distress. HENT:   Head: Normocephalic and atraumatic. Right Ear: External ear normal.   Left Ear: External ear normal.   Nose: Nose normal.   Mouth/Throat: Posterior oropharyngeal erythema present. No oropharyngeal exudate or tonsillar abscesses. Eyes: Conjunctivae and EOM are normal. Right eye exhibits no discharge. Left eye exhibits no discharge. Neck: Normal range of motion. Neck supple. No JVD present. Cardiovascular: Normal rate, regular rhythm, normal heart sounds and intact distal pulses. Exam reveals no gallop and no friction rub. No murmur heard. Pulmonary/Chest: Effort normal. No stridor. No respiratory distress. She has decreased breath sounds. She has no wheezes. She has rhonchi in the right middle field. Abdominal: Soft. Bowel sounds are normal. She exhibits no distension. There is no tenderness. Musculoskeletal: Normal range of motion. She exhibits no edema, tenderness or deformity. Neurological: She is alert and oriented to person, place, and time. No sensory deficit. She exhibits normal muscle tone. Skin: Skin is warm and dry. No rash noted. She is not diaphoretic. No erythema.        DIFFERENTIAL  DIAGNOSIS     PLAN (LABS / IMAGING / EKG):  Orders Placed This Encounter   Procedures    RAPID INFLUENZA A/B ANTIGENS    XR CHEST STANDARD (2 VW)    Basic Metabolic Panel    CBC Auto Differential    Troponin    EKG 12 Lead       MEDICATIONS ORDERED:  Orders Placed This Encounter   Medications    benzonatate (TESSALON PERLES) 100 MG capsule     Sig: Take 1 capsule by mouth 3 times daily as needed for Cough     Dispense:  30 capsule     Refill:  0    Benzocaine-Menthol (CEPACOL EXTRA STRENGTH) 15-2.6 MG LOZG lozenge     Sig: Take 1 lozenge by mouth every 2 hours as needed for Sore Throat     Dispense:  18 lozenge     Refill:  1    ibuprofen (ADVIL;MOTRIN) 600 MG tablet     Sig: Take 1 tablet by mouth every 6 hours as needed for Pain     Dispense:  30 tablet     Refill:  0       DDX: viral uri, bronchitis, pneumonia, angina, ACS, pleuritis, viral syndrome     DIAGNOSTIC RESULTS / EMERGENCY DEPARTMENT COURSE / MDM     LABS:  Results for orders placed or performed during the hospital encounter of 05/26/19   RAPID INFLUENZA A/B ANTIGENS   Result Value Ref Range    Specimen Description . NASOPHARYNGEAL SWAB     Special Requests NOT REPORTED     Direct Exam       PRESUMPTIVE NEGATIVE for Influenza A + B antigens. PCR testing to confirm this result is available upon request.  Specimen will be saved in the laboratory for 7 days. Please call [phone] if PCR testing is indicated.    Basic Metabolic Panel   Result Value Ref Range    Glucose 212 (H) 70 - 99 mg/dL    BUN 16 8 - 23 mg/dL    CREATININE 0.77 0.50 - 0.90 mg/dL    Bun/Cre Ratio NOT REPORTED 9 - 20    Calcium 9.4 8.6 - 10.4 mg/dL    Sodium 136 135 - 144 mmol/L    Potassium 3.8 3.7 - 5.3 mmol/L    Chloride 101 98 - 107 mmol/L    CO2 25 20 - 31 mmol/L    Anion Gap 10 9 - 17 mmol/L    GFR Non-African American >60 >60 mL/min    GFR African American >60 >60 mL/min    GFR Comment          GFR Staging NOT REPORTED    CBC Auto Differential   Result Value Ref Range    WBC 7.2 3.5 - 11.3 k/uL    RBC 4.41 3.95 - 5.11 m/uL    Hemoglobin 12.5 11.9 - 15.1 g/dL    Hematocrit 39.7 36.3 - 47.1 %    MCV 90.0 82.6 - 102.9 fL    MCH 28.3 25.2 - 33.5 pg    MCHC 31.5 28.4 - 34.8 g/dL    RDW 15.3 (H) 11.8 - 14.4 %    Platelets 109 261 - 988 k/uL    MPV 11.0 8.1 - 13.5 fL    NRBC Automated 0.0 0.0 per 100 WBC    Differential Type NOT REPORTED     Seg Neutrophils 47 36 - 65 %    Lymphocytes 38 24 - 43 %    Monocytes 7 3 - 12 %    Eosinophils % 7 (H) 1 - 4 %    Basophils 1 0 - 2 %    Immature Granulocytes 0 0 %    Segs Absolute 3.42 1.50 - 8.10 k/uL    Absolute Lymph # 2.71 1.10 - 3.70 k/uL    Absolute Mono # 0.53 0.10 - 1.20 k/uL    Absolute Eos # 0.48 (H) 0.00 - 0.44 k/uL    Basophils # 0.05 0.00 - 0.20 k/uL    Absolute Immature Granulocyte <0.03 0.00 - 0.30 k/uL    WBC Morphology NOT REPORTED     RBC Morphology ANISOCYTOSIS PRESENT     Platelet Estimate NOT REPORTED    Troponin   Result Value Ref Range    Troponin, High Sensitivity 11 0 - 14 ng/L    Troponin T NOT REPORTED <0.03 ng/mL    Troponin Interp NOT REPORTED        IMPRESSION: 25-year-old female with 2 days of productive cough, generalized malaise, chest tightness. History of hypertension, hyperlipidemia, diabetes. Stroke in 2016. No cardiac history. Labs show no concerning abnormalities, aside from mildly elevated glucose. EKG without concerning ST, T wave changes. Troponin within normal limits. Chest x-ray shows no opacities or infiltrate concerning for pneumonia. Impression is viral URI. RADIOLOGY:    Xr Chest Standard (2 Vw)    Result Date: 5/26/2019  EXAMINATION: TWO XRAY VIEWS OF THE CHEST 5/26/2019 8:06 am COMPARISON: July 2, 2017 HISTORY: ORDERING SYSTEM PROVIDED HISTORY: Productive cough, chest tightness TECHNOLOGIST PROVIDED HISTORY: Productive cough, chest tightness Acuity: Unknown Type of Exam: Unknown FINDINGS: Cardiomediastinal silhouette is stable. No effusion, pneumothorax, or airspace consolidation. Mild interstitial fullness due to chronic pulmonary change, stable. No focal airspace consolidation. EKG    EKG Interpretation    Interpreted by me    Rhythm: normal sinus   Rate: normal  Axis: normal  Ectopy: none  Conduction: normal  ST Segments: no acute change  T Waves: no acute change  Q Waves: none    Clinical Impression: no acute changes and normal EKG    All EKG's are interpreted by the Emergency Department Physician who either signs or Co-signs this chart in the absence of a cardiologist.    EMERGENCY DEPARTMENT COURSE:    25-year-old female presents with 2 days of productive cough, chest tightness, malaise. Patient denies sick contacts, fever, chills, skin changes.

## 2019-05-28 LAB
EKG ATRIAL RATE: 81 BPM
EKG P AXIS: 65 DEGREES
EKG P-R INTERVAL: 168 MS
EKG Q-T INTERVAL: 398 MS
EKG QRS DURATION: 72 MS
EKG QTC CALCULATION (BAZETT): 462 MS
EKG R AXIS: 5 DEGREES
EKG T AXIS: 28 DEGREES
EKG VENTRICULAR RATE: 81 BPM

## 2019-06-30 DIAGNOSIS — I10 UNCONTROLLED HYPERTENSION: ICD-10-CM

## 2019-07-01 NOTE — TELEPHONE ENCOUNTER
E-scribe request for Amlodipine. Please review and e-scribe if applicable. Last Visit Date:  5/6/19  Next Visit Date:  Visit date not found    Hemoglobin A1C (%)   Date Value   05/06/2019 8.7   01/02/2019 8.5   09/19/2018 8.0             ( goal A1C is < 7)   Microalb/Crt.  Ratio (mcg/mg creat)   Date Value   06/19/2018 CANNOT BE CALCULATED     LDL Cholesterol (mg/dL)   Date Value   10/06/2017 128       (goal LDL is <100)   BUN (mg/dL)   Date Value   05/26/2019 16     BP Readings from Last 3 Encounters:   05/26/19 (!) 175/82   05/06/19 (!) 140/76   01/02/19 (!) 142/77          (goal 120/80)        Patient Active Problem List:     Type 2 diabetes mellitus with complication, without long-term current use of insulin (HCC)     Astigmatism, regular     Background diabetic retinopathy ([address]     Benign hypertension     Eczema     Generalized osteoarthritis     Hyperlipidemia     Hypertensive retinopathy of both eyes     Nonsmoker     Pseudophakia     Systolic murmur     Temporary cerebral vascular dysfunction

## 2019-07-06 RX ORDER — AMLODIPINE BESYLATE 10 MG/1
TABLET ORAL
Qty: 30 TABLET | Refills: 5 | Status: SHIPPED | OUTPATIENT
Start: 2019-07-06 | End: 2019-12-26

## 2019-10-02 ENCOUNTER — OFFICE VISIT (OUTPATIENT)
Dept: INTERNAL MEDICINE | Age: 79
End: 2019-10-02
Payer: MEDICARE

## 2019-10-02 VITALS
TEMPERATURE: 98.1 F | BODY MASS INDEX: 33.46 KG/M2 | DIASTOLIC BLOOD PRESSURE: 82 MMHG | WEIGHT: 196 LBS | HEIGHT: 64 IN | SYSTOLIC BLOOD PRESSURE: 136 MMHG | HEART RATE: 66 BPM

## 2019-10-02 DIAGNOSIS — Z23 NEED FOR IMMUNIZATION AGAINST TETANUS ALONE: ICD-10-CM

## 2019-10-02 DIAGNOSIS — Z23 NEED FOR PROPHYLACTIC VACCINATION AND INOCULATION AGAINST VARICELLA: ICD-10-CM

## 2019-10-02 DIAGNOSIS — G89.29 CHRONIC BILATERAL LOW BACK PAIN WITHOUT SCIATICA: ICD-10-CM

## 2019-10-02 DIAGNOSIS — M54.50 CHRONIC BILATERAL LOW BACK PAIN WITHOUT SCIATICA: ICD-10-CM

## 2019-10-02 DIAGNOSIS — Z00.00 ROUTINE GENERAL MEDICAL EXAMINATION AT A HEALTH CARE FACILITY: Primary | ICD-10-CM

## 2019-10-02 DIAGNOSIS — Z23 NEED FOR PROPHYLACTIC VACCINATION AGAINST DIPHTHERIA-TETANUS-PERTUSSIS (DTP): ICD-10-CM

## 2019-10-02 PROCEDURE — G0438 PPPS, INITIAL VISIT: HCPCS | Performed by: INTERNAL MEDICINE

## 2019-10-02 PROCEDURE — G8482 FLU IMMUNIZE ORDER/ADMIN: HCPCS | Performed by: INTERNAL MEDICINE

## 2019-10-02 PROCEDURE — 4040F PNEUMOC VAC/ADMIN/RCVD: CPT | Performed by: INTERNAL MEDICINE

## 2019-10-02 PROCEDURE — 1123F ACP DISCUSS/DSCN MKR DOCD: CPT | Performed by: INTERNAL MEDICINE

## 2019-10-02 RX ORDER — IBUPROFEN 800 MG/1
800 TABLET ORAL EVERY 8 HOURS PRN
Qty: 30 TABLET | Refills: 1 | Status: SHIPPED | OUTPATIENT
Start: 2019-10-02 | End: 2019-10-02

## 2019-10-02 RX ORDER — IBUPROFEN 600 MG/1
600 TABLET ORAL EVERY 6 HOURS PRN
Qty: 30 TABLET | Refills: 0 | Status: SHIPPED | OUTPATIENT
Start: 2019-10-02 | End: 2019-12-03 | Stop reason: ALTCHOICE

## 2019-10-02 ASSESSMENT — PATIENT HEALTH QUESTIONNAIRE - PHQ9
SUM OF ALL RESPONSES TO PHQ QUESTIONS 1-9: 1
SUM OF ALL RESPONSES TO PHQ QUESTIONS 1-9: 1

## 2019-10-02 ASSESSMENT — LIFESTYLE VARIABLES: HOW OFTEN DO YOU HAVE A DRINK CONTAINING ALCOHOL: 0

## 2019-10-28 ENCOUNTER — HOSPITAL ENCOUNTER (OUTPATIENT)
Age: 79
Setting detail: SPECIMEN
Discharge: HOME | End: 2019-10-28
Payer: MEDICARE

## 2019-10-28 ENCOUNTER — OFFICE VISIT (OUTPATIENT)
Dept: INTERNAL MEDICINE | Age: 79
End: 2019-10-28
Payer: MEDICARE

## 2019-10-28 VITALS
WEIGHT: 200 LBS | DIASTOLIC BLOOD PRESSURE: 89 MMHG | HEART RATE: 74 BPM | BODY MASS INDEX: 34.15 KG/M2 | SYSTOLIC BLOOD PRESSURE: 132 MMHG | HEIGHT: 64 IN

## 2019-10-28 DIAGNOSIS — E11.9 DM TYPE 2 WITHOUT RETINOPATHY (HCC): Primary | ICD-10-CM

## 2019-10-28 DIAGNOSIS — R21 RASH: ICD-10-CM

## 2019-10-28 DIAGNOSIS — M79.10 MYALGIA DUE TO STATIN: ICD-10-CM

## 2019-10-28 DIAGNOSIS — D50.8 OTHER IRON DEFICIENCY ANEMIA: ICD-10-CM

## 2019-10-28 DIAGNOSIS — E11.65 UNCONTROLLED TYPE 2 DIABETES MELLITUS WITH HYPERGLYCEMIA, WITHOUT LONG-TERM CURRENT USE OF INSULIN (HCC): ICD-10-CM

## 2019-10-28 DIAGNOSIS — I10 ESSENTIAL HYPERTENSION: ICD-10-CM

## 2019-10-28 DIAGNOSIS — T46.6X5A MYALGIA DUE TO STATIN: ICD-10-CM

## 2019-10-28 LAB
ANION GAP SERPL CALCULATED.3IONS-SCNC: 13 MMOL/L (ref 9–17)
BUN BLDV-MCNC: 15 MG/DL (ref 8–23)
BUN/CREAT BLD: ABNORMAL (ref 9–20)
CALCIUM SERPL-MCNC: 9.4 MG/DL (ref 8.6–10.4)
CHLORIDE BLD-SCNC: 105 MMOL/L (ref 98–107)
CO2: 23 MMOL/L (ref 20–31)
CREAT SERPL-MCNC: 0.63 MG/DL (ref 0.5–0.9)
GFR AFRICAN AMERICAN: >60 ML/MIN
GFR NON-AFRICAN AMERICAN: >60 ML/MIN
GFR SERPL CREATININE-BSD FRML MDRD: ABNORMAL ML/MIN/{1.73_M2}
GFR SERPL CREATININE-BSD FRML MDRD: ABNORMAL ML/MIN/{1.73_M2}
GLUCOSE BLD-MCNC: 177 MG/DL (ref 70–99)
HBA1C MFR BLD: 9.7 %
IRON SATURATION: 20 % (ref 20–55)
IRON: 65 UG/DL (ref 37–145)
POTASSIUM SERPL-SCNC: 4.2 MMOL/L (ref 3.7–5.3)
SODIUM BLD-SCNC: 141 MMOL/L (ref 135–144)
TOTAL IRON BINDING CAPACITY: 321 UG/DL (ref 250–450)
UNSATURATED IRON BINDING CAPACITY: 256 UG/DL (ref 112–347)
VITAMIN B-12: 1099 PG/ML (ref 232–1245)
VITAMIN D 25-HYDROXY: 25.7 NG/ML (ref 30–100)

## 2019-10-28 PROCEDURE — 80048 BASIC METABOLIC PNL TOTAL CA: CPT

## 2019-10-28 PROCEDURE — 1036F TOBACCO NON-USER: CPT | Performed by: INTERNAL MEDICINE

## 2019-10-28 PROCEDURE — 83550 IRON BINDING TEST: CPT

## 2019-10-28 PROCEDURE — 1123F ACP DISCUSS/DSCN MKR DOCD: CPT | Performed by: INTERNAL MEDICINE

## 2019-10-28 PROCEDURE — 36415 COLL VENOUS BLD VENIPUNCTURE: CPT

## 2019-10-28 PROCEDURE — G8482 FLU IMMUNIZE ORDER/ADMIN: HCPCS | Performed by: INTERNAL MEDICINE

## 2019-10-28 PROCEDURE — G8427 DOCREV CUR MEDS BY ELIG CLIN: HCPCS | Performed by: INTERNAL MEDICINE

## 2019-10-28 PROCEDURE — G8399 PT W/DXA RESULTS DOCUMENT: HCPCS | Performed by: INTERNAL MEDICINE

## 2019-10-28 PROCEDURE — 4040F PNEUMOC VAC/ADMIN/RCVD: CPT | Performed by: INTERNAL MEDICINE

## 2019-10-28 PROCEDURE — 82306 VITAMIN D 25 HYDROXY: CPT

## 2019-10-28 PROCEDURE — 1090F PRES/ABSN URINE INCON ASSESS: CPT | Performed by: INTERNAL MEDICINE

## 2019-10-28 PROCEDURE — 83540 ASSAY OF IRON: CPT

## 2019-10-28 PROCEDURE — G8417 CALC BMI ABV UP PARAM F/U: HCPCS | Performed by: INTERNAL MEDICINE

## 2019-10-28 PROCEDURE — 99214 OFFICE O/P EST MOD 30 MIN: CPT | Performed by: INTERNAL MEDICINE

## 2019-10-28 PROCEDURE — 99211 OFF/OP EST MAY X REQ PHY/QHP: CPT

## 2019-10-28 PROCEDURE — 82607 VITAMIN B-12: CPT

## 2019-10-28 PROCEDURE — 83036 HEMOGLOBIN GLYCOSYLATED A1C: CPT | Performed by: INTERNAL MEDICINE

## 2019-10-28 RX ORDER — IBUPROFEN 800 MG/1
800 TABLET ORAL
Refills: 0 | Status: ON HOLD | COMMUNITY
Start: 2019-10-02 | End: 2021-01-01 | Stop reason: HOSPADM

## 2019-10-29 RX ORDER — GLIPIZIDE 5 MG/1
5 TABLET ORAL 2 TIMES DAILY
Qty: 60 TABLET | Refills: 3 | Status: SHIPPED | OUTPATIENT
Start: 2019-10-29 | End: 2020-07-15

## 2019-11-04 RX ORDER — MELATONIN
1000 DAILY
Qty: 90 TABLET | Refills: 1 | Status: SHIPPED | OUTPATIENT
Start: 2019-11-04

## 2019-11-05 ENCOUNTER — HOSPITAL ENCOUNTER (OUTPATIENT)
Age: 79
Setting detail: SPECIMEN
Discharge: HOME | End: 2019-11-05
Payer: MEDICARE

## 2019-11-05 DIAGNOSIS — E11.65 UNCONTROLLED TYPE 2 DIABETES MELLITUS WITH HYPERGLYCEMIA, WITHOUT LONG-TERM CURRENT USE OF INSULIN (HCC): ICD-10-CM

## 2019-11-05 LAB
CREATININE URINE: 200 MG/DL (ref 28–217)
HCT VFR BLD CALC: 41 % (ref 36.3–47.1)
HEMOGLOBIN: 12.7 G/DL (ref 11.9–15.1)
MCH RBC QN AUTO: 28.3 PG (ref 25.2–33.5)
MCHC RBC AUTO-ENTMCNC: 31 G/DL (ref 28.4–34.8)
MCV RBC AUTO: 91.5 FL (ref 82.6–102.9)
NRBC AUTOMATED: 0 PER 100 WBC
PDW BLD-RTO: 15.9 % (ref 11.8–14.4)
PLATELET # BLD: 269 K/UL (ref 138–453)
PMV BLD AUTO: 11.8 FL (ref 8.1–13.5)
RBC # BLD: 4.48 M/UL (ref 3.95–5.11)
TOTAL PROTEIN, URINE: 24 MG/DL
URINE TOTAL PROTEIN CREATININE RATIO: 0.12 (ref 0–0.2)
WBC # BLD: 6.3 K/UL (ref 3.5–11.3)

## 2019-11-05 PROCEDURE — 82570 ASSAY OF URINE CREATININE: CPT

## 2019-11-05 PROCEDURE — 36415 COLL VENOUS BLD VENIPUNCTURE: CPT

## 2019-11-05 PROCEDURE — 85027 COMPLETE CBC AUTOMATED: CPT

## 2019-11-05 PROCEDURE — 84156 ASSAY OF PROTEIN URINE: CPT

## 2019-11-18 DIAGNOSIS — I10 UNCONTROLLED HYPERTENSION: ICD-10-CM

## 2019-12-03 ENCOUNTER — OFFICE VISIT (OUTPATIENT)
Dept: DERMATOLOGY | Age: 79
End: 2019-12-03
Payer: MEDICARE

## 2019-12-03 VITALS
OXYGEN SATURATION: 95 % | BODY MASS INDEX: 33.63 KG/M2 | HEART RATE: 83 BPM | WEIGHT: 197 LBS | SYSTOLIC BLOOD PRESSURE: 158 MMHG | HEIGHT: 64 IN | DIASTOLIC BLOOD PRESSURE: 86 MMHG

## 2019-12-03 DIAGNOSIS — L30.9 NIPPLE DERMATITIS: Primary | ICD-10-CM

## 2019-12-03 PROCEDURE — 1090F PRES/ABSN URINE INCON ASSESS: CPT | Performed by: DERMATOLOGY

## 2019-12-03 PROCEDURE — 99202 OFFICE O/P NEW SF 15 MIN: CPT | Performed by: DERMATOLOGY

## 2019-12-03 PROCEDURE — G8417 CALC BMI ABV UP PARAM F/U: HCPCS | Performed by: DERMATOLOGY

## 2019-12-03 PROCEDURE — G8399 PT W/DXA RESULTS DOCUMENT: HCPCS | Performed by: DERMATOLOGY

## 2019-12-03 PROCEDURE — G8482 FLU IMMUNIZE ORDER/ADMIN: HCPCS | Performed by: DERMATOLOGY

## 2019-12-03 PROCEDURE — 1036F TOBACCO NON-USER: CPT | Performed by: DERMATOLOGY

## 2019-12-03 PROCEDURE — 1123F ACP DISCUSS/DSCN MKR DOCD: CPT | Performed by: DERMATOLOGY

## 2019-12-03 PROCEDURE — G8427 DOCREV CUR MEDS BY ELIG CLIN: HCPCS | Performed by: DERMATOLOGY

## 2019-12-03 PROCEDURE — 4040F PNEUMOC VAC/ADMIN/RCVD: CPT | Performed by: DERMATOLOGY

## 2019-12-17 RX ORDER — LISINOPRIL AND HYDROCHLOROTHIAZIDE 25; 20 MG/1; MG/1
1 TABLET ORAL DAILY
Qty: 30 TABLET | Refills: 5 | Status: SHIPPED | OUTPATIENT
Start: 2019-12-17 | End: 2020-05-26

## 2020-01-02 ENCOUNTER — OFFICE VISIT (OUTPATIENT)
Dept: DERMATOLOGY | Age: 80
End: 2020-01-02
Payer: MEDICARE

## 2020-01-02 VITALS
HEIGHT: 64 IN | HEART RATE: 77 BPM | SYSTOLIC BLOOD PRESSURE: 133 MMHG | BODY MASS INDEX: 33.67 KG/M2 | WEIGHT: 197.2 LBS | DIASTOLIC BLOOD PRESSURE: 77 MMHG | OXYGEN SATURATION: 96 %

## 2020-01-02 PROCEDURE — 99213 OFFICE O/P EST LOW 20 MIN: CPT | Performed by: DERMATOLOGY

## 2020-01-02 PROCEDURE — 1036F TOBACCO NON-USER: CPT | Performed by: DERMATOLOGY

## 2020-01-02 PROCEDURE — 1123F ACP DISCUSS/DSCN MKR DOCD: CPT | Performed by: DERMATOLOGY

## 2020-01-02 PROCEDURE — G8417 CALC BMI ABV UP PARAM F/U: HCPCS | Performed by: DERMATOLOGY

## 2020-01-02 PROCEDURE — G8427 DOCREV CUR MEDS BY ELIG CLIN: HCPCS | Performed by: DERMATOLOGY

## 2020-01-02 PROCEDURE — G8399 PT W/DXA RESULTS DOCUMENT: HCPCS | Performed by: DERMATOLOGY

## 2020-01-02 PROCEDURE — 1090F PRES/ABSN URINE INCON ASSESS: CPT | Performed by: DERMATOLOGY

## 2020-01-02 PROCEDURE — G8482 FLU IMMUNIZE ORDER/ADMIN: HCPCS | Performed by: DERMATOLOGY

## 2020-01-02 PROCEDURE — 4040F PNEUMOC VAC/ADMIN/RCVD: CPT | Performed by: DERMATOLOGY

## 2020-01-02 RX ORDER — TRIAMCINOLONE ACETONIDE 0.25 MG/G
OINTMENT TOPICAL
Qty: 30 G | Refills: 2 | Status: SHIPPED | OUTPATIENT
Start: 2020-01-02 | End: 2020-10-08 | Stop reason: ALTCHOICE

## 2020-01-02 NOTE — PROGRESS NOTES
Dermatology Patient Note  [facility]  [facility]  [address] [address]  Dept: [phone]  Dept Fax: [phone]: 1/2/2020   REFERRING PROVIDER: No ref. provider found      Vivek Zapata is a 78 y.o. female  who presents today in the office for:    Follow-up (4 weeks nipple, improved alot)      HISTORY OF PRESENT ILLNESS:  Patient presents for f/u nipple dermatitis  Interval history: nearly resolved  Patient assessment of progress: near complete and adequate  Current treatment and adherence: triamcinolone 0.1% ointment  Prior treatments tried: see initial HPI      CURRENT MEDICATIONS:   Current Outpatient Medications   Medication Sig Dispense Refill    triamcinolone (KENALOG) 0.025 % ointment Apply topically 2 times daily to rash on nipple 30 g 2    amLODIPine (NORVASC) 10 MG tablet TAKE 1 TABLET BY MOUTH ONCE DAILY 30 tablet 5    lisinopril-hydrochlorothiazide (PRINZIDE;ZESTORETIC) 20-25 MG per tablet take 1 tablet by mouth daily 30 tablet 5    Cholecalciferol (VITAMIN D3) 25 MCG (1000 UT) TABS Take 1 tablet by mouth daily 90 tablet 1    glipiZIDE (GLUCOTROL) 5 MG tablet Take 1 tablet by mouth 2 times daily 60 tablet 3    ibuprofen (ADVIL;MOTRIN) 800 MG tablet 800 mg  0    carvedilol (COREG) 6.25 MG tablet Take 1 tablet by mouth 2 times daily 60 tablet 5    hydrALAZINE (APRESOLINE) 25 MG tablet Take 1 tablet by mouth 3 times daily 90 tablet 3    metFORMIN (GLUCOPHAGE) 1000 MG tablet Take 1 tablet by mouth 2 times daily (with meals) 60 tablet 11     No current facility-administered medications for this visit.         ALLERGIES:   Allergies   Allergen Reactions    Codeine        SOCIAL HISTORY:  Social History     Tobacco Use    Smoking status: Never Smoker    Smokeless tobacco: Never Used   Substance Use Topics    Alcohol use: No       REVIEW OF SYSTEMS:  Review of Systems  Skin: Denies any new changing, growing orbleeding lesions or rashes

## 2020-02-12 ENCOUNTER — OFFICE VISIT (OUTPATIENT)
Dept: INTERNAL MEDICINE | Age: 80
End: 2020-02-12
Payer: MEDICARE

## 2020-02-12 VITALS
HEIGHT: 64 IN | DIASTOLIC BLOOD PRESSURE: 95 MMHG | SYSTOLIC BLOOD PRESSURE: 138 MMHG | BODY MASS INDEX: 32.81 KG/M2 | HEART RATE: 83 BPM | WEIGHT: 192.2 LBS

## 2020-02-12 PROCEDURE — 99214 OFFICE O/P EST MOD 30 MIN: CPT | Performed by: INTERNAL MEDICINE

## 2020-02-12 PROCEDURE — G8417 CALC BMI ABV UP PARAM F/U: HCPCS | Performed by: INTERNAL MEDICINE

## 2020-02-12 PROCEDURE — 4040F PNEUMOC VAC/ADMIN/RCVD: CPT | Performed by: INTERNAL MEDICINE

## 2020-02-12 PROCEDURE — G8399 PT W/DXA RESULTS DOCUMENT: HCPCS | Performed by: INTERNAL MEDICINE

## 2020-02-12 PROCEDURE — G8482 FLU IMMUNIZE ORDER/ADMIN: HCPCS | Performed by: INTERNAL MEDICINE

## 2020-02-12 PROCEDURE — 1036F TOBACCO NON-USER: CPT | Performed by: INTERNAL MEDICINE

## 2020-02-12 PROCEDURE — 1090F PRES/ABSN URINE INCON ASSESS: CPT | Performed by: INTERNAL MEDICINE

## 2020-02-12 PROCEDURE — 1123F ACP DISCUSS/DSCN MKR DOCD: CPT | Performed by: INTERNAL MEDICINE

## 2020-02-12 PROCEDURE — 99211 OFF/OP EST MAY X REQ PHY/QHP: CPT | Performed by: INTERNAL MEDICINE

## 2020-02-12 PROCEDURE — G8427 DOCREV CUR MEDS BY ELIG CLIN: HCPCS | Performed by: INTERNAL MEDICINE

## 2020-02-12 RX ORDER — CHLORAL HYDRATE 500 MG
1000 CAPSULE ORAL 3 TIMES DAILY
Qty: 90 CAPSULE | Refills: 11 | Status: SHIPPED | OUTPATIENT
Start: 2020-02-12

## 2020-02-12 ASSESSMENT — PATIENT HEALTH QUESTIONNAIRE - PHQ9
SUM OF ALL RESPONSES TO PHQ QUESTIONS 1-9: 0
SUM OF ALL RESPONSES TO PHQ QUESTIONS 1-9: 0
1. LITTLE INTEREST OR PLEASURE IN DOING THINGS: 0
2. FEELING DOWN, DEPRESSED OR HOPELESS: 0
SUM OF ALL RESPONSES TO PHQ9 QUESTIONS 1 & 2: 0

## 2020-02-12 NOTE — PROGRESS NOTES
Visit Information    Have you changed or started any medications since your last visit including any over-the-counter medicines, vitamins, or herbal medicines? no   Have you stopped taking any of your medications? Is so, why? -  no  Are you having any side effects from any of your medications? - no    Have you seen any other physician or provider since your last visit? yes -    Have you had any other diagnostic tests since your last visit?  no   Have you been seen in the emergency room and/or had an admission in a hospital since we last saw you?  no   Have you had your routine dental cleaning in the past 6 months?  no     Do you have an active MyChart account? If no, what is the barrier?   Yes    Patient Care Team:  Janice Mariee MD as PCP - General (Internal Medicine)  Janice Mariee MD as PCP - [facility]    Medical History Review  Past Medical, Family, and Social History reviewed and does contribute to the patient presenting condition    Health Maintenance   Topic Date Due    DTaP/Tdap/Td vaccine (1 - Tdap) [birth year]26/1949    Hepatitis B vaccine (1 of 3 - Risk 3-dose series) 05/26/1957    Shingles Vaccine (2 of 3) 02/26/2011    Annual Wellness Visit (AWV)  10/02/2020    Potassium monitoring  10/28/2020    Creatinine monitoring  10/28/2020    DEXA (modify frequency per FRAX score)  Completed    Flu vaccine  Completed    Pneumococcal 65+ years Vaccine  Completed    Hepatitis A vaccine  Aged Out    Hib vaccine  Aged Out    Meningococcal (ACWY) vaccine  Aged Out

## 2020-02-12 NOTE — PROGRESS NOTES
5' 4.02\" (1.626 m)   Wt 192 lb 3.2 oz (87.2 kg)   BMI 32.97 kg/m²     BP Readings from Last 3 Encounters:   02/12/20 (!) 138/95   01/02/20 133/77   12/03/19 (!) 158/86        Wt Readings from Last 3 Encounters:   02/12/20 192 lb 3.2 oz (87.2 kg)   01/02/20 197 lb 3.2 oz (89.4 kg)   12/03/19 197 lb (89.4 kg)       Physical Exam  Constitutional:       Appearance: Normal appearance. HENT:      Head: Normocephalic. Right Ear: Tympanic membrane normal.      Nose: Nose normal.      Mouth/Throat:      Mouth: Mucous membranes are moist.   Eyes:      Pupils: Pupils are equal, round, and reactive to light. Neck:      Musculoskeletal: Normal range of motion. Cardiovascular:      Rate and Rhythm: Normal rate and regular rhythm. Pulses: Normal pulses. Pulmonary:      Effort: Pulmonary effort is normal.      Breath sounds: Normal breath sounds. Abdominal:      General: Abdomen is flat. Bowel sounds are normal.      Palpations: Abdomen is soft. Musculoskeletal: Normal range of motion. General: No swelling. Skin:     General: Skin is warm and dry. Neurological:      General: No focal deficit present. Mental Status: She is alert and oriented to person, place, and time. Psychiatric:         Mood and Affect: Mood normal.         Behavior: Behavior normal.         Body mass index is 32.97 kg/m².     RESULTS    Lab Findings    CBC:   Lab Results   Component Value Date    WBC 6.3 11/05/2019    HGB 12.7 11/05/2019     11/05/2019     BMP:   Lab Results   Component Value Date     10/28/2019    K 4.2 10/28/2019     10/28/2019    CO2 23 10/28/2019    BUN 15 10/28/2019    CREATININE 0.63 10/28/2019    GLUCOSE 177 10/28/2019     HEMOGLOBIN A1C:   Lab Results   Component Value Date    LABA1C 9.7 10/28/2019     MICROALBUMIN URINE:   Lab Results   Component Value Date    MICROALBUR <12 06/19/2018     FASTING LIPID PANEL:   Lab Results   Component Value Date    CHOL 196 10/06/2017    HDL 44 10/06/2017    TRIG 121 10/06/2017     Lab Results   Component Value Date    LDLCHOLESTEROL 128 10/06/2017     LIVER PROFILE: No results found for: ALT, AST, PROT, BILITOT, BILIDIR, LABALBU   THYROID FUNCTION: No results found for: TSH   URINE ANALYSIS: No results found for: [address]    1. Need for prophylactic vaccination against diphtheria-tetanus-pertussis (DTP)    - Tdap (ADACEL) 5-2-15.5 LF-MCG/0.5 injection; Inject 0.5 mLs into the muscle once for 1 dose  Dispense: 0.5 mL; Refill: 0    2. Need for prophylactic vaccination and inoculation against varicella    - zoster recombinant adjuvanted vaccine [facility]) 50 MCG/0.5ML SUSR injection; Inject 0.5 mLs into the muscle once for 1 dose 50 MCG IM then repeat 2-6 months. Dispense: 1 each; Refill: 1    3. Dyslipidemia (high LDL; low HDL)    - Omega-3 Fatty Acids (FISH OIL) 1000 MG CAPS; Take 1 capsule by mouth 3 times daily  Dispense: 90 capsule; Refill: 11  - Lipid, Fasting; Future    4. Screening for deficiency anemia    - CBC With Auto Differential; Future    5. Diabetes mellitus type 2, uncontrolled, without complications (HCC)    - Hemoglobin A1C; Future  - Basic Metabolic Panel; Future            Follow up Instructions:    1. Return in about 8 months (around 10/12/2020). 2. Reviewed prior labs and health maintenance. 3. Discussed use, benefit, and side effects of prescribed medications. Barriers to medication compliance addressed. All patient questions answered. Pt voiced understanding.      4. Patient given educational materials - see patient instructions      Elyse Judd MD  Attending Physician, [address] [place] [address], Internal Medicine Residency Program  [facility]) Physician - [facility]  2/12/2020, 4:51 PM

## 2020-05-26 RX ORDER — LISINOPRIL AND HYDROCHLOROTHIAZIDE 25; 20 MG/1; MG/1
TABLET ORAL
Qty: 30 TABLET | Refills: 5 | Status: SHIPPED | OUTPATIENT
Start: 2020-05-26 | End: 2020-12-07 | Stop reason: SDUPTHER

## 2020-05-28 ENCOUNTER — TELEPHONE (OUTPATIENT)
Dept: INTERNAL MEDICINE | Age: 80
End: 2020-05-28

## 2020-07-15 RX ORDER — GLIPIZIDE 5 MG/1
5 TABLET ORAL 2 TIMES DAILY
Qty: 60 TABLET | Refills: 3 | Status: SHIPPED | OUTPATIENT
Start: 2020-07-15 | End: 2020-10-08

## 2020-09-10 ENCOUNTER — TELEPHONE (OUTPATIENT)
Dept: INTERNAL MEDICINE | Age: 80
End: 2020-09-10

## 2020-10-08 ENCOUNTER — VIRTUAL VISIT (OUTPATIENT)
Dept: INTERNAL MEDICINE | Age: 80
End: 2020-10-08
Payer: MEDICARE

## 2020-10-08 PROCEDURE — 99442 PR PHYS/QHP TELEPHONE EVALUATION 11-20 MIN: CPT | Performed by: INTERNAL MEDICINE

## 2020-10-08 RX ORDER — GLIPIZIDE 5 MG/1
5 TABLET ORAL
Qty: 60 TABLET | Refills: 11 | Status: SHIPPED | OUTPATIENT
Start: 2020-10-08 | End: 2021-07-08 | Stop reason: ALTCHOICE

## 2020-10-08 ASSESSMENT — ENCOUNTER SYMPTOMS
EYES NEGATIVE: 1
GASTROINTESTINAL NEGATIVE: 1

## 2020-10-08 NOTE — PATIENT INSTRUCTIONS
-Pt due for 2 month f/u in December-- pt to call in November to set up an appt--reminder in [facility] to contact patient as well--AVS given to patient    -JEWELL Pimentel

## 2020-10-08 NOTE — PROGRESS NOTES
10/8/2020    TELEHEALTH EVALUATION -- Audio(During [id number] public health emergency)    Patient is evaluated via telephone on 2020. Consent:  The patient and/or health care decision maker is aware that that he may receive a bill for this telephone service, depending on his insurance coverage, and has provided verbal consent to proceed: Yes    HPI:    Greg Nam (:  1940) has requested an audio evaluation for the following concern(s):    F/u on chronic medical problem s  She does not know what she takes at home   Does not check her bp at home   Fasting blood sugars are well controlled       Review of Systems   Constitutional: Negative for activity change. Eyes: Negative. Cardiovascular: Negative. Gastrointestinal: Negative. Genitourinary: Negative. Neurological: Negative. Psychiatric/Behavioral: Negative. Prior to Visit Medications    Medication Sig Taking?  Authorizing Provider   glipiZIDE (GLUCOTROL) 5 MG tablet Take 1 tablet by mouth 2 times daily (before meals) Yes Jayleen Harp MD   amLODIPine (NORVASC) 10 MG tablet take 1 tablet by mouth once daily Yes Jayleen Harp MD   lisinopril-hydroCHLOROthiazide (PRINZIDE;ZESTORETIC) 20-25 MG per tablet take 1 tablet by mouth once daily Yes Jayleen Harp MD   metFORMIN (GLUCOPHAGE) 1000 MG tablet Take 1 tablet by mouth 2 times daily (with meals)  Patient taking differently: Take 1,000 mg by mouth daily (with breakfast)  Yes Tati Wakefield MD   Omega-3 Fatty Acids (FISH OIL) 1000 MG CAPS Take 1 capsule by mouth 3 times daily  Patient taking differently: Take 1,000 mg by mouth 2 times daily  Yes Jayleen Harp MD   Cholecalciferol (VITAMIN D3) 25 MCG (1000 UT) TABS Take 1 tablet by mouth daily Yes Jayleen Harp MD   ibuprofen (ADVIL;MOTRIN) 800 MG tablet 800 mg Yes Historical Provider, MD   carvedilol (COREG) 6.25 MG tablet Take 1 tablet by mouth 2 times daily Yes Cecilia Cortes MD       Social History     Tobacco Use  Smoking status: Never Smoker    Smokeless tobacco: Never Used   Substance Use Topics    Alcohol use: No    Drug use: No            RECORD REVIEW: Previous medical records were reviewed at today's visit. PHYSICAL EXAMINATION:    Vital Signs: (As obtained by patient/caregiver at home)      Constitutional: [] Appears well-developed and well-nourished [] No apparent distress      [] Abnormal   Mental status  [] Alert and awake  [] Oriented to person/place/time []Able to follow commands        Pulmonary/Chest: [] Respiratory effort normal.  [] No visualized signs of difficulty breathing or respiratory distress        [] Abnormal              Psychiatric:       [] Normal [] Abnormal        [] No Hallucinations    Other pertinent observable physical exam findings:-            RECORD REVIEW: Previous medical records were reviewed at today's visit. The past family, medical and social histories were reviewed and unchanged with the exceptions of what is mentioned in this note. Due to this being a TeleHealth encounter, evaluation of the following organ systems is limited: Vitals/Constitutional/EENT/Resp/CV/GI//MS/Neuro/Skin/Heme-Lymph-Imm. ASSESSMENT/PLAN:  1. DM type 2 without retinopathy (HCC)    - glipiZIDE (GLUCOTROL) 5 MG tablet; Take 1 tablet by mouth 2 times daily (before meals)  Dispense: 60 tablet; Refill: 11    2. Uncontrolled hypertension      3. Dyslipidemia (high LDL; low HDL)    Most of the time spent in reviewing medications, discussing side effects, assessing depression and fall risk   Answering all the questions and providing diabetes education      Total Time spent 25 min     Return in about 2 months (around 12/8/2020). An  electronic signature was used to authenticate this note.     --Yanick Hernández MD on 10/8/2020 at 3:52 PM    9}    Pursuant to the emergency declaration under the Formerly named [facility] [place], [address] and Response Supplemental Appropriations Act, this Virtual  Visit was conducted, with patient's consent, to reduce the patient's risk of exposure to COVID-19 and provide continuity of care for an established patient. Services were provided through a telephone discussion virtually to substitute for in-person clinic visit.

## 2020-10-12 ENCOUNTER — VIRTUAL VISIT (OUTPATIENT)
Dept: INTERNAL MEDICINE | Age: 80
End: 2020-10-12
Payer: MEDICARE

## 2020-10-12 PROCEDURE — G0439 PPPS, SUBSEQ VISIT: HCPCS | Performed by: NURSE PRACTITIONER

## 2020-10-12 PROCEDURE — G0444 DEPRESSION SCREEN ANNUAL: HCPCS | Performed by: NURSE PRACTITIONER

## 2020-10-12 ASSESSMENT — LIFESTYLE VARIABLES: HOW OFTEN DO YOU HAVE A DRINK CONTAINING ALCOHOL: 0

## 2020-10-12 ASSESSMENT — PATIENT HEALTH QUESTIONNAIRE - PHQ9
SUM OF ALL RESPONSES TO PHQ9 QUESTIONS 1 & 2: 0
SUM OF ALL RESPONSES TO PHQ QUESTIONS 1-9: 0
2. FEELING DOWN, DEPRESSED OR HOPELESS: 0
1. LITTLE INTEREST OR PLEASURE IN DOING THINGS: 0
SUM OF ALL RESPONSES TO PHQ QUESTIONS 1-9: 0

## 2020-10-12 NOTE — PATIENT INSTRUCTIONS
Personalized Preventive Plan for Heather Arce - 10/12/2020  Medicare offers a range of preventive health benefits. Some of the tests and screenings are paid in full while other may be subject to a deductible, co-insurance, and/or copay. Some of these benefits include a comprehensive review of your medical history including lifestyle, illnesses that may run in your family, and various assessments and screenings as appropriate. After reviewing your medical record and screening and assessments performed today your provider may have ordered immunizations, labs, imaging, and/or referrals for you. A list of these orders (if applicable) as well as your Preventive Care list are included within your After Visit Summary for your review. Other Preventive Recommendations:    · A preventive eye exam performed by an eye specialist is recommended every 1-2 years to screen for glaucoma; cataracts, macular degeneration, and other eye disorders. · A preventive dental visit is recommended every 6 months. · Try to get at least 150 minutes of exercise per week or 10,000 steps per day on a pedometer . · Order or download the FREE \"Exercise & Physical Activity: Your Everyday Guide\" from [facility] [employer]. Call 7-727.908.5009 or search [facility] [employer] online. · You need 0809-7119 mg of calcium and 0416-0250 IU of vitamin D per day. It is possible to meet your calcium requirement with diet alone, but a vitamin D supplement is usually necessary to meet this goal.  · When exposed to the sun, use a sunscreen that protects against both UVA and UVB radiation with an SPF of 30 or greater. Reapply every 2 to 3 hours or after sweating, drying off with a towel, or swimming. · Always wear a seat belt when traveling in a car. Always wear a helmet when riding a bicycle or motorcycle.

## 2020-10-12 NOTE — PROGRESS NOTES
Medicare Annual Wellness Visit  Are Name: Maria Ines Cheney Date: 10/12/2020   MRN: [id number] Sex: Female   Age: 78 y.o. Ethnicity: Non-/Non    : 1940 Race: Alberto Huang is here for [facility] (decloned DTAP d/t cost, shingles ordered)    Screenings for behavioral, psychosocial and functional/safety risks, and cognitive dysfunction are all negative except as indicated below. These results, as well as other patient data from the [address] form, are documented in Flowsheets linked to this Encounter. Allergies   Allergen Reactions    Atorvastatin      Severe myopathy    Codeine          Prior to Visit Medications    Medication Sig Taking?  Authorizing Provider   zoster recombinant adjuvanted vaccine [facility]) 50 MCG/0.5ML SUSR injection Inject 0.5 mLs into the muscle once for 1 dose Yes PAULINE Poole - CNP   glipiZIDE (GLUCOTROL) 5 MG tablet Take 1 tablet by mouth 2 times daily (before meals) Yes Albino Ortega MD   amLODIPine (NORVASC) 10 MG tablet take 1 tablet by mouth once daily Yes Albino Ortega MD   Omega-3 Fatty Acids (FISH OIL) 1000 MG CAPS Take 1 capsule by mouth 3 times daily  Patient taking differently: Take 1,000 mg by mouth 2 times daily  Yes Albino Ortega MD   Cholecalciferol (VITAMIN D3) 25 MCG (1000 UT) TABS Take 1 tablet by mouth daily Yes Albino Ortega MD   ibuprofen (ADVIL;MOTRIN) 800 MG tablet 800 mg Yes Historical Provider, MD   carvedilol (COREG) 6.25 MG tablet Take 1 tablet by mouth 2 times daily Yes Casimiro Juárez MD   lisinopril-hydroCHLOROthiazide (PRINZIDE;ZESTORETIC) 20-25 MG per tablet take 1 tablet by mouth once daily  Patient not taking: Reported on 10/12/2020  Albino Ortega MD   metFORMIN (GLUCOPHAGE) 1000 MG tablet Take 1 tablet by mouth 2 times daily (with meals)  Patient taking differently: Take 1,000 mg by mouth daily (with breakfast)   Albino Ortega MD         Past Medical History:   Diagnosis Date    Arthritis     Diabetes mellitus ([facility] [address]     Hyperlipidemia 11/9/2016    Hypertension     Hypokalemia with normal acid-base balance 9/13/2016    Type 2 diabetes mellitus with hyperosmolarity not at goal [facility]) 9/13/2016       Past Surgical History:   Procedure Laterality Date    EYE SURGERY      HYSTERECTOMY      HYSTERECTOMY, TOTAL ABDOMINAL      TUBAL LIGATION         No family history on file. CareTeam (Including outside providers/suppliers regularly involved in providing care):   Patient Care Team:  Kelly Altman MD as PCP - General (Internal Medicine)  Kelly Altman MD as PCP - [facility] Provider    Wt Readings from Last 3 Encounters:   02/12/20 192 lb 3.2 oz (87.2 kg)   01/02/20 197 lb 3.2 oz (89.4 kg)   12/03/19 197 lb (89.4 kg)      Patient-Reported Vitals 10/12/2020   Patient-Reported Weight 182.0lb   Patient-Reported Height 5 4   Patient-Reported Systolic 180   Patient-Reported Diastolic 72      There is no height or weight on file to calculate BMI. Based upon direct observation of the patient, evaluation of cognition reveals recent and remote memory intact. Wt Readings from Last 3 Encounters:   02/12/20 192 lb 3.2 oz (87.2 kg)   01/02/20 197 lb 3.2 oz (89.4 kg)   12/03/19 197 lb (89.4 kg)     Temp Readings from Last 3 Encounters:   10/02/19 98.1 °F (36.7 °C) (Oral)   05/26/19 98.1 °F (36.7 °C)   10/17/17 98.7 °F (37.1 °C) (Oral)     BP Readings from Last 3 Encounters:   02/12/20 (!) 138/95   01/02/20 133/77   12/03/19 (!) 158/86     Pulse Readings from Last 3 Encounters:   02/12/20 83   01/02/20 77   12/03/19 83       Patient's complete Health Risk Assessment and screening values have been reviewed and are found in Flowsheets. The following problems were reviewed today and where indicated follow up appointments were made and/or referrals ordered. Positive Risk Factor Screenings with Interventions:     Cognitive:   Words recalled: 2 Words Recalled  Clock Drawing Test (CDT) Score: Nikos Kuhn)  Total Score Interpretation: Positive Mini-Cog  Cognitive Impairment Interventions:  · Patient declines any further evaluation/treatment for cognitive impairment    General Health and ACP:  General  In general, how would you say your health is?: Good  In the past 7 days, have you experienced any of the following?  New or Increased Pain, New or Increased Fatigue, Loneliness, Social Isolation, Stress or Anger?: (!) Loneliness, Social Isolation(d/t COVID)  Do you get the social and emotional support that you need?: Yes  Do you have a Living Will?: Yes  Advance Directives     Power of  Living Will ACP-Advance Directive ACP-Power of     Not on File Not on File [address] Interventions:  · declined need    Health Habits/Nutrition:  Health Habits/Nutrition  Do you exercise for at least 20 minutes 2-3 times per week?: Yes  Have you lost any weight without trying in the past 3 months?: No  Do you eat fewer than 2 meals per day?: No  Have you seen a dentist within the past year?: (!) No     Health Habits/Nutrition Interventions:  · dentures    Hearing/Vision:  No exam data present  Hearing/Vision  Do you or your family notice any trouble with your hearing?: No  Do you have difficulty driving, watching TV, or doing any of your daily activities because of your eyesight?: (!) Yes  Have you had an eye exam within the past year?: Yes  Hearing/Vision Interventions:  · Hearing concerns:  ENT referral provided    Personalized Preventive Plan   Current Health Maintenance Status  Immunization History   Administered Date(s) Administered    Influenza A (H5N1) Monovalent Vaccine, Adjuvanted-2013 08/09/2019    Influenza Vaccine, unspecified formulation 01/01/2012, 10/07/2013, 09/04/2014, 09/01/2015, 09/26/2016    Influenza Virus Vaccine 09/01/2015, 10/07/2018    Influenza, High Dose (Fluzone 65 yrs and older) 09/14/2015, 09/26/2016, 08/14/2017    Influenza, Triv, inactivated, subunit, adjuvanted, IM (Fluad 65 yrs and older) 10/27/2018, 08/09/2019, 08/14/2020    Pneumococcal Conjugate 13-valent (Lodema Chanel) 09/14/2015, 12/15/2017    Pneumococcal Polysaccharide ([id number]) 01/27/2006, 09/01/2015, 05/21/2018    Td, unspecified formulation 01/27/2006    Zoster Live (Zostavax) 01/01/2011        Health Maintenance   Topic Date Due    DTaP/Tdap/Td vaccine (1 - Tdap) 05/26/1957    Shingles Vaccine (2 of 3) 02/26/2011    Annual Wellness Visit (AWV)  05/29/2019    Potassium monitoring  10/28/2020    Creatinine monitoring  10/28/2020    DEXA (modify frequency per FRAX score)  Completed    Flu vaccine  Completed    Pneumococcal 65+ years Vaccine  Completed    Hepatitis A vaccine  Aged Out    Hib vaccine  Aged Out    Meningococcal (ACWY) vaccine  Aged Out     Recommendations for [employer] Due: see orders and patient instructions/AVS.  . Recommended screening schedule for the next 5-10 years is provided to the patient in written form: see Patient Instructions/AVS.    Alta Stafford was seen today for medicare awv. Diagnoses and all orders for this visit:    Need for prophylactic vaccination and inoculation against varicella  -     zoster recombinant adjuvanted vaccine [facility]) 50 MCG/0.5ML SUSR injection; Inject 0.5 mLs into the muscle once for 1 dose    Routine general medical examination at a health care facility  -     Carmen Juárez MD, Otolaryngology, [address] [place] [address] Dr Agustin Mccarthy is a 78 y.o. female being evaluated by a Virtual Visit (phone) encounter to address concerns as mentioned above. A caregiver was present when appropriate. Due to this being a TeleHealth encounter (During JANMY-78 public health emergency), evaluation of the following organ systems was limited: Vitals/Constitutional/EENT/Resp/CV/GI//MS/Neuro/Skin/Heme-Lymph-Imm.   Pursuant to the emergency declaration under the [address] [place], [facility] waiver authority and the [facility] Appropriations Act, this Virtual Visit was conducted with patient's (and/or legal guardian's) consent, to reduce the patient's risk of exposure to COVID-19 and provide necessary medical care. The patient (and/or legal guardian) has also been advised to contact this office for worsening conditions or problems, and seek emergency medical treatment and/or call 911 if deemed necessary. Patient identification was verified at the start of the visit: Yes    Services were provided through phone to substitute for in-person clinic visit. Patient and provider were located at their individual homes. --PAULINE Pablo CNP on 10/12/2020 at 8:49 AM    An electronic signature was used to authenticate this note.

## 2020-12-07 RX ORDER — LISINOPRIL AND HYDROCHLOROTHIAZIDE 25; 20 MG/1; MG/1
TABLET ORAL
Qty: 30 TABLET | Refills: 11 | Status: SHIPPED | OUTPATIENT
Start: 2020-12-07 | End: 2021-07-08 | Stop reason: ALTCHOICE

## 2020-12-07 NOTE — TELEPHONE ENCOUNTER
Pt requesting medication refill, lisinopril-hctz  Next Visit Date:pt on wait list til 12/30/20, last seen 10/8/20  No future appointments. Health Maintenance   Topic Date Due    DTaP/Tdap/Td vaccine (1 - Tdap) 05/26/1957    Shingles Vaccine (2 of 3) 02/26/2011    Potassium monitoring  10/28/2020    Creatinine monitoring  10/28/2020    Annual Wellness Visit (AWV)  10/13/2021    DEXA (modify frequency per FRAX score)  Completed    Flu vaccine  Completed    Pneumococcal 65+ years Vaccine  Completed    Hepatitis A vaccine  Aged Out    Hib vaccine  Aged Out    Meningococcal (ACWY) vaccine  Aged Out       Hemoglobin A1C (%)   Date Value   10/28/2019 9.7   05/06/2019 8.7   01/02/2019 8.5             ( goal A1C is < 7)   Microalb/Crt.  Ratio (mcg/mg creat)   Date Value   06/19/2018 CANNOT BE CALCULATED     LDL Cholesterol (mg/dL)   Date Value   10/06/2017 128       (goal LDL is <100)   BUN (mg/dL)   Date Value   10/28/2019 15     BP Readings from Last 3 Encounters:   02/12/20 (!) 138/95   01/02/20 133/77   12/03/19 (!) 158/86          (goal 120/80)    All Future Testing planned in [facility]  Lab Frequency Next Occurrence   Lipid, Fasting Once 12/10/2020   CBC With Auto Differential Once 12/10/2020   Hemoglobin A1C Once 45/57/1693   Basic Metabolic Panel Once 74/21/4231               Patient Active Problem List:     Type 2 diabetes mellitus with complication, without long-term current use of insulin (HCC)     Astigmatism, regular     Background diabetic retinopathy ([facility] [address]     Benign hypertension     Eczema     Generalized osteoarthritis     Hyperlipidemia     Hypertensive retinopathy of both eyes     Nonsmoker     Pseudophakia     Systolic murmur     Temporary cerebral vascular dysfunction

## 2020-12-25 ENCOUNTER — APPOINTMENT (OUTPATIENT)
Dept: GENERAL RADIOLOGY | Age: 80
DRG: 177 | End: 2020-12-25
Payer: MEDICARE

## 2020-12-25 ENCOUNTER — HOSPITAL ENCOUNTER (INPATIENT)
Age: 80
LOS: 7 days | Discharge: HOME | DRG: 177 | End: 2021-01-01
Attending: EMERGENCY MEDICINE | Admitting: INTERNAL MEDICINE
Payer: MEDICARE

## 2020-12-25 DIAGNOSIS — U07.1 COVID-19: Primary | ICD-10-CM

## 2020-12-25 DIAGNOSIS — E11.65 UNCONTROLLED TYPE 2 DIABETES MELLITUS WITH HYPERGLYCEMIA, WITHOUT LONG-TERM CURRENT USE OF INSULIN (HCC): ICD-10-CM

## 2020-12-25 DIAGNOSIS — E11.9 DM TYPE 2 WITHOUT RETINOPATHY (HCC): ICD-10-CM

## 2020-12-25 PROBLEM — R79.82 ELEVATED C-REACTIVE PROTEIN (CRP): Status: ACTIVE | Noted: 2020-12-25

## 2020-12-25 PROBLEM — J96.01 ACUTE HYPOXEMIC RESPIRATORY FAILURE (HCC): Status: ACTIVE | Noted: 2020-12-25

## 2020-12-25 PROBLEM — J12.82 PNEUMONIA DUE TO COVID-19 VIRUS: Status: ACTIVE | Noted: 2020-12-25

## 2020-12-25 LAB
ABO/RH: NORMAL
ABSOLUTE EOS #: 0.08 K/UL (ref 0–0.44)
ABSOLUTE IMMATURE GRANULOCYTE: <0.03 K/UL (ref 0–0.3)
ABSOLUTE LYMPH #: 1.63 K/UL (ref 1.1–3.7)
ABSOLUTE MONO #: 0.24 K/UL (ref 0.1–1.2)
ALBUMIN SERPL-MCNC: 3.3 G/DL (ref 3.5–5.2)
ALBUMIN/GLOBULIN RATIO: 0.8 (ref 1–2.5)
ALP BLD-CCNC: 106 U/L (ref 35–104)
ALT SERPL-CCNC: 8 U/L (ref 5–33)
ANION GAP SERPL CALCULATED.3IONS-SCNC: 13 MMOL/L (ref 9–17)
AST SERPL-CCNC: 21 U/L
BASOPHILS # BLD: 0 % (ref 0–2)
BASOPHILS ABSOLUTE: <0.03 K/UL (ref 0–0.2)
BILIRUB SERPL-MCNC: 0.26 MG/DL (ref 0.3–1.2)
BILIRUBIN DIRECT: 0.09 MG/DL
BILIRUBIN, INDIRECT: 0.17 MG/DL (ref 0–1)
BUN BLDV-MCNC: 14 MG/DL (ref 8–23)
BUN/CREAT BLD: ABNORMAL (ref 9–20)
C-REACTIVE PROTEIN: 76.7 MG/L (ref 0–5)
CALCIUM SERPL-MCNC: 8.9 MG/DL (ref 8.6–10.4)
CHLORIDE BLD-SCNC: 97 MMOL/L (ref 98–107)
CO2: 28 MMOL/L (ref 20–31)
CREAT SERPL-MCNC: 0.73 MG/DL (ref 0.5–0.9)
DIFFERENTIAL TYPE: ABNORMAL
EOSINOPHILS RELATIVE PERCENT: 1 % (ref 1–4)
FERRITIN: 152 UG/L (ref 13–150)
FIBRINOGEN: 755 MG/DL (ref 140–420)
GFR AFRICAN AMERICAN: >60 ML/MIN
GFR NON-AFRICAN AMERICAN: >60 ML/MIN
GFR SERPL CREATININE-BSD FRML MDRD: ABNORMAL ML/MIN/{1.73_M2}
GFR SERPL CREATININE-BSD FRML MDRD: ABNORMAL ML/MIN/{1.73_M2}
GLOBULIN: ABNORMAL G/DL (ref 1.5–3.8)
GLUCOSE BLD-MCNC: 287 MG/DL (ref 70–99)
GLUCOSE BLD-MCNC: 320 MG/DL (ref 65–105)
HCT VFR BLD CALC: 39.5 % (ref 36.3–47.1)
HEMOGLOBIN: 12.8 G/DL (ref 11.9–15.1)
IMMATURE GRANULOCYTES: 0 %
INR BLD: 0.9
LACTATE DEHYDROGENASE: 389 U/L (ref 135–214)
LYMPHOCYTES # BLD: 24 % (ref 24–43)
MCH RBC QN AUTO: 28.6 PG (ref 25.2–33.5)
MCHC RBC AUTO-ENTMCNC: 32.4 G/DL (ref 28.4–34.8)
MCV RBC AUTO: 88.4 FL (ref 82.6–102.9)
MONOCYTES # BLD: 4 % (ref 3–12)
NRBC AUTOMATED: 0 PER 100 WBC
PARTIAL THROMBOPLASTIN TIME: 27.4 SEC (ref 20.5–30.5)
PDW BLD-RTO: 14.9 % (ref 11.8–14.4)
PLATELET # BLD: 209 K/UL (ref 138–453)
PLATELET ESTIMATE: ABNORMAL
PMV BLD AUTO: 11.4 FL (ref 8.1–13.5)
POTASSIUM SERPL-SCNC: 3.9 MMOL/L (ref 3.7–5.3)
PROCALCITONIN: 0.08 NG/ML
PROTHROMBIN TIME: 9.7 SEC (ref 9–12)
RBC # BLD: 4.47 M/UL (ref 3.95–5.11)
RBC # BLD: ABNORMAL 10*6/UL
SARS-COV-2, RAPID: DETECTED
SARS-COV-2: ABNORMAL
SARS-COV-2: ABNORMAL
SEG NEUTROPHILS: 71 % (ref 36–65)
SEGMENTED NEUTROPHILS ABSOLUTE COUNT: 4.72 K/UL (ref 1.5–8.1)
SODIUM BLD-SCNC: 138 MMOL/L (ref 135–144)
SOURCE: ABNORMAL
TOTAL PROTEIN: 7.3 G/DL (ref 6.4–8.3)
TROPONIN INTERP: NORMAL
TROPONIN T: NORMAL NG/ML
TROPONIN, HIGH SENSITIVITY: 10 NG/L (ref 0–14)
WBC # BLD: 6.7 K/UL (ref 3.5–11.3)
WBC # BLD: ABNORMAL 10*3/UL

## 2020-12-25 PROCEDURE — 93005 ELECTROCARDIOGRAM TRACING: CPT | Performed by: STUDENT IN AN ORGANIZED HEALTH CARE EDUCATION/TRAINING PROGRAM

## 2020-12-25 PROCEDURE — 6370000000 HC RX 637 (ALT 250 FOR IP): Performed by: INTERNAL MEDICINE

## 2020-12-25 PROCEDURE — 2060000000 HC ICU INTERMEDIATE R&B

## 2020-12-25 PROCEDURE — 80076 HEPATIC FUNCTION PANEL: CPT

## 2020-12-25 PROCEDURE — 84145 PROCALCITONIN (PCT): CPT

## 2020-12-25 PROCEDURE — 71045 X-RAY EXAM CHEST 1 VIEW: CPT

## 2020-12-25 PROCEDURE — 84484 ASSAY OF TROPONIN QUANT: CPT

## 2020-12-25 PROCEDURE — 82728 ASSAY OF FERRITIN: CPT

## 2020-12-25 PROCEDURE — 85384 FIBRINOGEN ACTIVITY: CPT

## 2020-12-25 PROCEDURE — 86140 C-REACTIVE PROTEIN: CPT

## 2020-12-25 PROCEDURE — 6370000000 HC RX 637 (ALT 250 FOR IP): Performed by: STUDENT IN AN ORGANIZED HEALTH CARE EDUCATION/TRAINING PROGRAM

## 2020-12-25 PROCEDURE — U0003 INFECTIOUS AGENT DETECTION BY NUCLEIC ACID (DNA OR RNA); SEVERE ACUTE RESPIRATORY SYNDROME CORONAVIRUS 2 (SARS-COV-2) (CORONAVIRUS DISEASE [COVID-19]), AMPLIFIED PROBE TECHNIQUE, MAKING USE OF HIGH THROUGHPUT TECHNOLOGIES AS DESCRIBED BY CMS-2020-01-R: HCPCS

## 2020-12-25 PROCEDURE — 86900 BLOOD TYPING SEROLOGIC ABO: CPT

## 2020-12-25 PROCEDURE — U0002 COVID-19 LAB TEST NON-CDC: HCPCS

## 2020-12-25 PROCEDURE — 80048 BASIC METABOLIC PNL TOTAL CA: CPT

## 2020-12-25 PROCEDURE — 2700000000 HC OXYGEN THERAPY PER DAY

## 2020-12-25 PROCEDURE — 82947 ASSAY GLUCOSE BLOOD QUANT: CPT

## 2020-12-25 PROCEDURE — 99223 1ST HOSP IP/OBS HIGH 75: CPT | Performed by: INTERNAL MEDICINE

## 2020-12-25 PROCEDURE — 2580000003 HC RX 258: Performed by: INTERNAL MEDICINE

## 2020-12-25 PROCEDURE — XW13325 TRANSFUSION OF CONVALESCENT PLASMA (NONAUTOLOGOUS) INTO PERIPHERAL VEIN, PERCUTANEOUS APPROACH, NEW TECHNOLOGY GROUP 5: ICD-10-PCS | Performed by: INTERNAL MEDICINE

## 2020-12-25 PROCEDURE — 6360000002 HC RX W HCPCS: Performed by: INTERNAL MEDICINE

## 2020-12-25 PROCEDURE — 86901 BLOOD TYPING SEROLOGIC RH(D): CPT

## 2020-12-25 PROCEDURE — 85025 COMPLETE CBC W/AUTO DIFF WBC: CPT

## 2020-12-25 PROCEDURE — 2500000003 HC RX 250 WO HCPCS: Performed by: INTERNAL MEDICINE

## 2020-12-25 PROCEDURE — 85610 PROTHROMBIN TIME: CPT

## 2020-12-25 PROCEDURE — 6360000002 HC RX W HCPCS: Performed by: STUDENT IN AN ORGANIZED HEALTH CARE EDUCATION/TRAINING PROGRAM

## 2020-12-25 PROCEDURE — 94761 N-INVAS EAR/PLS OXIMETRY MLT: CPT

## 2020-12-25 PROCEDURE — 36415 COLL VENOUS BLD VENIPUNCTURE: CPT

## 2020-12-25 PROCEDURE — 83615 LACTATE (LD) (LDH) ENZYME: CPT

## 2020-12-25 PROCEDURE — 85730 THROMBOPLASTIN TIME PARTIAL: CPT

## 2020-12-25 PROCEDURE — XW033E5 INTRODUCTION OF REMDESIVIR ANTI-INFECTIVE INTO PERIPHERAL VEIN, PERCUTANEOUS APPROACH, NEW TECHNOLOGY GROUP 5: ICD-10-PCS | Performed by: INTERNAL MEDICINE

## 2020-12-25 PROCEDURE — 99283 EMERGENCY DEPT VISIT LOW MDM: CPT

## 2020-12-25 RX ORDER — MAGNESIUM SULFATE 1 G/100ML
1 INJECTION INTRAVENOUS PRN
Status: DISCONTINUED | OUTPATIENT
Start: 2020-12-25 | End: 2021-01-01 | Stop reason: HOSPADM

## 2020-12-25 RX ORDER — DEXTROSE MONOHYDRATE 50 MG/ML
100 INJECTION, SOLUTION INTRAVENOUS PRN
Status: DISCONTINUED | OUTPATIENT
Start: 2020-12-25 | End: 2021-01-01 | Stop reason: HOSPADM

## 2020-12-25 RX ORDER — POLYETHYLENE GLYCOL 3350 17 G/17G
17 POWDER, FOR SOLUTION ORAL DAILY PRN
Status: DISCONTINUED | OUTPATIENT
Start: 2020-12-25 | End: 2021-01-01 | Stop reason: HOSPADM

## 2020-12-25 RX ORDER — CARVEDILOL 6.25 MG/1
6.25 TABLET ORAL 2 TIMES DAILY
Status: DISCONTINUED | OUTPATIENT
Start: 2020-12-25 | End: 2021-01-01 | Stop reason: HOSPADM

## 2020-12-25 RX ORDER — ONDANSETRON 2 MG/ML
4 INJECTION INTRAMUSCULAR; INTRAVENOUS EVERY 6 HOURS PRN
Status: DISCONTINUED | OUTPATIENT
Start: 2020-12-25 | End: 2021-01-01 | Stop reason: HOSPADM

## 2020-12-25 RX ORDER — AMLODIPINE BESYLATE 10 MG/1
10 TABLET ORAL DAILY
Status: DISCONTINUED | OUTPATIENT
Start: 2020-12-25 | End: 2021-01-01 | Stop reason: HOSPADM

## 2020-12-25 RX ORDER — NICOTINE POLACRILEX 4 MG
15 LOZENGE BUCCAL PRN
Status: DISCONTINUED | OUTPATIENT
Start: 2020-12-25 | End: 2021-01-01 | Stop reason: HOSPADM

## 2020-12-25 RX ORDER — NICOTINE 21 MG/24HR
1 PATCH, TRANSDERMAL 24 HOURS TRANSDERMAL DAILY PRN
Status: DISCONTINUED | OUTPATIENT
Start: 2020-12-25 | End: 2021-01-01 | Stop reason: HOSPADM

## 2020-12-25 RX ORDER — 0.9 % SODIUM CHLORIDE 0.9 %
30 INTRAVENOUS SOLUTION INTRAVENOUS PRN
Status: DISCONTINUED | OUTPATIENT
Start: 2020-12-25 | End: 2020-12-29

## 2020-12-25 RX ORDER — PROMETHAZINE HYDROCHLORIDE 12.5 MG/1
12.5 TABLET ORAL EVERY 6 HOURS PRN
Status: DISCONTINUED | OUTPATIENT
Start: 2020-12-25 | End: 2021-01-01 | Stop reason: HOSPADM

## 2020-12-25 RX ORDER — POTASSIUM CHLORIDE 20 MEQ/1
40 TABLET, EXTENDED RELEASE ORAL PRN
Status: DISCONTINUED | OUTPATIENT
Start: 2020-12-25 | End: 2021-01-01 | Stop reason: HOSPADM

## 2020-12-25 RX ORDER — ACETAMINOPHEN 325 MG/1
650 TABLET ORAL EVERY 6 HOURS PRN
Status: DISCONTINUED | OUTPATIENT
Start: 2020-12-25 | End: 2021-01-01 | Stop reason: HOSPADM

## 2020-12-25 RX ORDER — BENZONATATE 100 MG/1
100 CAPSULE ORAL 3 TIMES DAILY PRN
Status: DISCONTINUED | OUTPATIENT
Start: 2020-12-25 | End: 2021-01-01 | Stop reason: HOSPADM

## 2020-12-25 RX ORDER — ACETAMINOPHEN 650 MG/1
650 SUPPOSITORY RECTAL EVERY 6 HOURS PRN
Status: DISCONTINUED | OUTPATIENT
Start: 2020-12-25 | End: 2021-01-01 | Stop reason: HOSPADM

## 2020-12-25 RX ORDER — LISINOPRIL 20 MG/1
20 TABLET ORAL DAILY
Status: DISCONTINUED | OUTPATIENT
Start: 2020-12-25 | End: 2021-01-01 | Stop reason: HOSPADM

## 2020-12-25 RX ORDER — POTASSIUM CHLORIDE 7.45 MG/ML
10 INJECTION INTRAVENOUS PRN
Status: DISCONTINUED | OUTPATIENT
Start: 2020-12-25 | End: 2021-01-01 | Stop reason: HOSPADM

## 2020-12-25 RX ORDER — DEXAMETHASONE 4 MG/1
6 TABLET ORAL DAILY
Status: DISCONTINUED | OUTPATIENT
Start: 2020-12-25 | End: 2021-01-01 | Stop reason: HOSPADM

## 2020-12-25 RX ORDER — VITAMIN B COMPLEX
1000 TABLET ORAL DAILY
Status: DISCONTINUED | OUTPATIENT
Start: 2020-12-25 | End: 2021-01-01 | Stop reason: HOSPADM

## 2020-12-25 RX ORDER — GLIPIZIDE 5 MG/1
5 TABLET ORAL
Status: DISCONTINUED | OUTPATIENT
Start: 2020-12-25 | End: 2021-01-01 | Stop reason: HOSPADM

## 2020-12-25 RX ORDER — INSULIN GLARGINE 100 [IU]/ML
12 INJECTION, SOLUTION SUBCUTANEOUS NIGHTLY
Status: DISCONTINUED | OUTPATIENT
Start: 2020-12-25 | End: 2021-01-01 | Stop reason: HOSPADM

## 2020-12-25 RX ORDER — SODIUM CHLORIDE 9 MG/ML
INJECTION, SOLUTION INTRAVENOUS PRN
Status: DISCONTINUED | OUTPATIENT
Start: 2020-12-25 | End: 2020-12-29

## 2020-12-25 RX ORDER — DEXTROSE MONOHYDRATE 25 G/50ML
12.5 INJECTION, SOLUTION INTRAVENOUS PRN
Status: DISCONTINUED | OUTPATIENT
Start: 2020-12-25 | End: 2021-01-01 | Stop reason: HOSPADM

## 2020-12-25 RX ADMIN — DEXAMETHASONE 6 MG: 4 TABLET ORAL at 07:23

## 2020-12-25 RX ADMIN — INSULIN GLARGINE 12 UNITS: 100 INJECTION, SOLUTION SUBCUTANEOUS at 21:02

## 2020-12-25 RX ADMIN — INSULIN LISPRO 5 UNITS: 100 INJECTION, SOLUTION INTRAVENOUS; SUBCUTANEOUS at 21:01

## 2020-12-25 RX ADMIN — ACETAMINOPHEN 650 MG: 325 TABLET ORAL at 12:55

## 2020-12-25 RX ADMIN — LISINOPRIL 20 MG: 20 TABLET ORAL at 13:49

## 2020-12-25 RX ADMIN — ENOXAPARIN SODIUM 40 MG: 40 INJECTION SUBCUTANEOUS at 13:48

## 2020-12-25 RX ADMIN — INSULIN LISPRO 8 UNITS: 100 INJECTION, SOLUTION INTRAVENOUS; SUBCUTANEOUS at 16:03

## 2020-12-25 RX ADMIN — BENZONATATE 100 MG: 100 CAPSULE ORAL at 07:22

## 2020-12-25 RX ADMIN — ENOXAPARIN SODIUM 40 MG: 40 INJECTION SUBCUTANEOUS at 20:54

## 2020-12-25 RX ADMIN — Medication 1000 UNITS: at 13:48

## 2020-12-25 RX ADMIN — CARVEDILOL 6.25 MG: 6.25 TABLET, FILM COATED ORAL at 13:48

## 2020-12-25 RX ADMIN — REMDESIVIR 200 MG: 100 INJECTION, POWDER, LYOPHILIZED, FOR SOLUTION INTRAVENOUS at 17:03

## 2020-12-25 RX ADMIN — AMLODIPINE BESYLATE 10 MG: 10 TABLET ORAL at 13:48

## 2020-12-25 ASSESSMENT — PAIN SCALES - GENERAL
PAINLEVEL_OUTOF10: 4
PAINLEVEL_OUTOF10: 0
PAINLEVEL_OUTOF10: 4

## 2020-12-25 NOTE — ED NOTES
Advance care planning discussion completed. Patient reported that she has a living will and healthcare power of  documents at home. Patient reported that Daphney Gamez, her son, is listed as primary contact, but notes she wants her daughter, Candida Luis, to be the medical decision-maker. Patient is unable to recall daughter's phone number at this time, but notes that she only made Sawyer the emergency contact as he was living with the patient at the time. Candida Luis resides in [place]. Requested to retrieve number for daughter.       Jannet Kowalski, [address]  12/25/20 2034

## 2020-12-25 NOTE — ED NOTES
Dr. Linh Capellan at bedside re-evaluating patient and updating on test results and poc.      Presley Beasley RN  12/25/20 [id number]

## 2020-12-25 NOTE — ED NOTES
health and suddenly became very ill and were unable to breathe on your own, what would your preference be about the use of a ventilator (breathing machine) if it were available to you? \"      Would the patient desire the use of ventilator (breathing machine)?: yes    \"If your health worsens and it becomes clear that your chance of recovery is unlikely, what would your preference be about the use of a ventilator (breathing machine) if it were available to you? \"     Would the patient desire the use of ventilator (breathing machine)?: Yes      Resuscitation  \"CPR works best to restart the heart when there is a sudden event, like a heart attack, in someone who is otherwise healthy. Unfortunately, CPR does not typically restart the heart for people who have serious health conditions or who are very sick. \"    \"In the event your heart stopped as a result of an underlying serious health condition, would you want attempts to be made to restart your heart (answer \"yes\" for attempt to resuscitate) or would you prefer a natural death (answer \"no\" for do not attempt to resuscitate)? \" yes      NOTE: If the patient has a valid advance directive AND now provides care preference(s) that are inconsistent with that prior directive, advise the patient to consider either: creating a new advance directive that complies with state-specific requirements; or, if that is not possible, orally revoking that prior directive in accordance with state-specific requirements, which must be documented in the EHR. [] Yes   [x] No   Educated Patient / Martha Shelton regarding differences between Advance Directives and portable DNR orders.     Length of ACP Conversation in minutes:      Conversation Outcomes:  [x] ACP discussion completed  [] Existing advance directive reviewed with patient; no changes to patient's previously recorded wishes  [] New Advance Directive completed  [] Portable Do Not Rescitate prepared for Provider review and signature  [] POLST/POST/MOLST/MOST prepared for Provider review and signature      Follow-up plan:    [] Schedule follow-up conversation to continue planning  [] Referred individual to Provider for additional questions/concerns   [x] Advised patient/agent/surrogate to review completed ACP document and update if needed with changes in condition, patient preferences or care setting    [] This note routed to one or more involved healthcare providers          TOAN Godinez  12/25/20 [id number]

## 2020-12-25 NOTE — H&P
[facility]  Office: [address], DO, Mariam Ormond, DO, Reinanatali Bahena, DO, Anisha Yomaira Heller, DO, Analisa Whitman MD, Dipika Mcmillan MD, Jazzy Brown MD, Sloan Duran MD, Corey Carrillo MD, Meg Santoyo MD, Samir Vasquez MD, Sharyle Haff, MD, Christian Pelaez MD, Carmen Link DO, Charla Skaggs MD, Gricel Mercer MD, Armando Granado DO, Karli Salas MD,  Tory Frye DO, Jewel Adame MD, Gabriel Ashraf MD, Laron Juarez [facility], [facility] ([place], [place]), CNP, Paige Lopez, CNP, Warren Pemberton, CNS, Danyelle Catherine, CNP, Emilie Rivera, CNP, Chavo Saldana, CNP, Marius Spatz, CNP, Toi Siu, CNP, Fatou Lane PA-C, Jodie Caicedo, [facility], Charlotte Fay, CNP, Chris Jaime, CNP, Joey Kennedy, CNP, Saranya Church, CNP, Carolin Araujo, CNP         [address]    HISTORY AND PHYSICAL EXAMINATION            Date:   12/25/2020  Patient name:  Ariel Klinefelter  Date of admission:  12/25/2020  6:49 AM  MRN:   [id number]  Account:  [de-identified]  YOB: 1940  PCP:    Emerita Joe MD  Room:   3017/3017-01  Code Status:    Full Code    Chief Complaint:     Chief Complaint   Patient presents with    Chest Pain    Cough       History Obtained From:     patient    History of Present Illness:     Ariel Klinefelter is a 78 y.o. Non-/non  female who presents with Chest Pain and Cough   and is admitted to the hospital for the management of Pneumonia due to COVID-19 virus. This is an 68-year-old Afro-American female that presents with chest pressure and cough with increasing fatigue. Her symptoms started this past Monday with fatigue and just not feeling well. Over the last 24 hours or so she had increasing shortness of breath with increasing exertional dyspnea. She has had a cough which is minimally productive of a light yellow sputum. She has had chills and a low-grade fever associate with her symptoms.   She is Jacques Zamora MD        Allergies:     Atorvastatin and Codeine    Social History:     Tobacco:    reports that she has never smoked. She has never used smokeless tobacco.  Alcohol:      reports no history of alcohol use. Drug Use:  reports no history of drug use. Family History:     History reviewed. No pertinent family history. Review of Systems:     Positive and Negative as described in HPI.     CONSTITUTIONAL:  negative for fevers, chills, sweats, fatigue, weight loss  HEENT:  negative for vision, hearing changes, runny nose, throat pain  RESPIRATORY: Positive for shortness of breath, cough, congestion, negative for wheezing  CARDIOVASCULAR:  negative for palpitations, positive for pleuritic chest pain  GASTROINTESTINAL:  negative for nausea, vomiting, diarrhea, constipation, change in bowel habits, abdominal pain   GENITOURINARY:  negative for difficulty of urination, burning with urination, frequency   INTEGUMENT:  negative for rash, skin lesions, easy bruising   HEMATOLOGIC/LYMPHATIC:  negative for swelling/edema   ALLERGIC/IMMUNOLOGIC:  negative for urticaria , itching  ENDOCRINE:  negative increase in drinking, increase in urination, hot or cold intolerance  MUSCULOSKELETAL:  negative joint pains, muscle aches, swelling of joints  NEUROLOGICAL:  negative for headaches, dizziness, lightheadedness, numbness, pain, tingling extremities  BEHAVIOR/PSYCH:  negative for depression, anxiety    Physical Exam:   /71   Pulse 74   Temp 98.2 °F (36.8 °C) (Oral)   Resp 26   Ht 5' 4\" (1.626 m)   Wt 186 lb (84.4 kg)   SpO2 90%   BMI 31.93 kg/m²   Temp (24hrs), Av.8 °F (37.1 °C), Min:98.2 °F (36.8 °C), Max:99.5 °F (37.5 °C)    Recent Labs     20  1549   POCGLU 320*     No intake or output data in the 24 hours ending 20 1617    General Appearance: alert, acutely ill appearing, and in mild acute distress with increased respiratory rate  Mental status: oriented to person, place, and time  Head: normocephalic, atraumatic  Eye: no icterus, redness, pupils equal and reactive, extraocular eye movements intact, conjunctiva clear  Ear: normal external ear, no discharge, hearing intact  Nose: no drainage noted  Mouth: mucous membranes moist  Neck: supple, no carotid bruits, thyroid not palpable  Lungs: Bilateral equal air entry, coarse breath sounds, diminished airflow to bases, tachypnea with a rate 25, no accessory muscle use, 6 L nasal cannula to maintain saturations of low 90s  Cardiovascular: normal rate, regular rhythm, no murmur, gallop, rub  Abdomen: Soft, nontender, nondistended, normal bowel sounds, no hepatomegaly or splenomegaly  Neurologic: There are no new focal motor or sensory deficits, normal muscle tone and bulk, no abnormal sensation, normal speech, cranial nerves II through XII grossly intact  Skin: No gross lesions, rashes, bruising or bleeding on exposed skin area  Extremities: peripheral pulses palpable, no pedal edema or calf pain with palpation  Psych: normal affect    Investigations:      Laboratory Testing:  Recent Results (from the past 24 hour(s))   CBC Auto Differential    Collection Time: 12/25/20  7:22 AM   Result Value Ref Range    WBC 6.7 3.5 - 11.3 k/uL    RBC 4.47 3.95 - 5.11 m/uL    Hemoglobin 12.8 11.9 - 15.1 g/dL    Hematocrit 39.5 36.3 - 47.1 %    MCV 88.4 82.6 - 102.9 fL    MCH 28.6 25.2 - 33.5 pg    MCHC 32.4 28.4 - 34.8 g/dL    RDW 14.9 (H) 11.8 - 14.4 %    Platelets 399 743 - 786 k/uL    MPV 11.4 8.1 - 13.5 fL    NRBC Automated 0.0 0.0 per 100 WBC    Differential Type NOT REPORTED     Seg Neutrophils 71 (H) 36 - 65 %    Lymphocytes 24 24 - 43 %    Monocytes 4 3 - 12 %    Eosinophils % 1 1 - 4 %    Basophils 0 0 - 2 %    Immature Granulocytes 0 0 %    Segs Absolute 4.72 1.50 - 8.10 k/uL    Absolute Lymph # 1.63 1.10 - 3.70 k/uL    Absolute Mono # 0.24 0.10 - 1.20 k/uL    Absolute Eos # 0.08 0.00 - 0.44 k/uL    Basophils Absolute <0.03 0.00 - 0.20 k/uL    Absolute Immature Granulocyte <0.03 0.00 - 0.30 k/uL    WBC Morphology NOT REPORTED     RBC Morphology ANISOCYTOSIS PRESENT     Platelet Estimate NOT REPORTED    Basic Metabolic Panel w/ Reflex to MG    Collection Time: 12/25/20  7:22 AM   Result Value Ref Range    Glucose 287 (H) 70 - 99 mg/dL    BUN 14 8 - 23 mg/dL    CREATININE 0.73 0.50 - 0.90 mg/dL    Bun/Cre Ratio NOT REPORTED 9 - 20    Calcium 8.9 8.6 - 10.4 mg/dL    Sodium 138 135 - 144 mmol/L    Potassium 3.9 3.7 - 5.3 mmol/L    Chloride 97 (L) 98 - 107 mmol/L    CO2 28 20 - 31 mmol/L    Anion Gap 13 9 - 17 mmol/L    GFR Non-African American >60 >60 mL/min    GFR African American >60 >60 mL/min    GFR Comment          GFR Staging NOT REPORTED    Protime-INR    Collection Time: 12/25/20  7:22 AM   Result Value Ref Range    Protime 9.7 9.0 - 12.0 sec    INR 0.9    APTT    Collection Time: 12/25/20  7:22 AM   Result Value Ref Range    PTT 27.4 20.5 - 30.5 sec   Fibrinogen    Collection Time: 12/25/20  7:22 AM   Result Value Ref Range    Fibrinogen 755 (H) 140 - 420 mg/dL   Procalcitonin    Collection Time: 12/25/20  7:22 AM   Result Value Ref Range    Procalcitonin 0.08 <0.09 ng/mL   C-Reactive Protein    Collection Time: 12/25/20  7:22 AM   Result Value Ref Range    CRP 76.7 (H) 0.0 - 5.0 mg/L   Lactate Dehydrogenase    Collection Time: 12/25/20  7:22 AM   Result Value Ref Range     (H) 135 - 214 U/L   Ferritin    Collection Time: 12/25/20  7:22 AM   Result Value Ref Range    Ferritin 152 (H) 13 - 150 ug/L   Troponin    Collection Time: 12/25/20  7:22 AM   Result Value Ref Range    Troponin, High Sensitivity 10 0 - 14 ng/L    Troponin T NOT REPORTED <0.03 ng/mL    Troponin Interp NOT REPORTED    Hepatic Function Panel    Collection Time: 12/25/20  7:22 AM   Result Value Ref Range    Alb 3.3 (L) 3.5 - 5.2 g/dL    Alkaline Phosphatase 106 (H) 35 - 104 U/L    ALT 8 5 - 33 U/L    AST 21 <32 U/L    Total Bilirubin 0.26 (L) 0.3 - 1.2 mg/dL    Bilirubin, Direct 0.09 <0.31 mg/dL    Bilirubin, Indirect 0.17 0.00 - 1.00 mg/dL    Total Protein 7.3 6.4 - 8.3 g/dL    Globulin NOT REPORTED 1.5 - 3.8 g/dL    Albumin/Globulin Ratio 0.8 (L) 1.0 - 2.5   COVID-19    Collection Time: 12/25/20  7:32 AM    Specimen: Other   Result Value Ref Range    SARS-CoV-2          SARS-CoV-2, Rapid DETECTED (A) Not Detected    Source . NASOPHARYNGEAL SWAB     SARS-CoV-2         POC Glucose Fingerstick    Collection Time: 12/25/20  3:49 PM   Result Value Ref Range    POC Glucose 320 (H) 65 - 105 mg/dL       Imaging/Diagnostics:  Xr Chest Portable    Result Date: 12/25/2020  No cardiomegaly or interstitial edema. Subtle non consolidating pneumonia appears to be developing in both lower lobes. Assessment :      Hospital Problems           Last Modified POA    * (Principal) Pneumonia due to COVID-19 virus 12/25/2020 Yes    Type 2 diabetes mellitus with complication, without long-term current use of insulin ([facility] [address] (Chronic) 12/25/2020 Yes    Essential hypertension 12/25/2020 Yes    Acute hypoxemic respiratory failure ([facility] [address] 12/25/2020 Yes          Plan:     Patient status inpatient in the Progressive Unit/Step down    1. Admit inpatient  2. Supplemental oxygen continue nasal cannula for now, escalate to BiPAP or high flow if needed  3. Consented for convalescent plasma, will give 1 unit today  4. Pharmacy to dose remdesivir   5. start Lantus and add insulin scale optimize glycemic control, titrate as needed  6. GI and DVT prophylaxis  7. Monitor and control blood pressure  8. PT and OT as needed  9. Continue home maintenance medications  10. Trend labs, correct electrolytes as needed  11.  See orders for details    Consultations:   IP CONSULT TO HOSPITALIST  IP CONSULT TO PHARMACY  IP CONSULT TO INFECTIOUS DISEASES     Patient is admitted as inpatient status because of co-morbidities listed above, severity of signs and symptoms as outlined, requirement for current medical therapies and most importantly because of direct risk to patient if care not provided in a hospital setting. Expected length of stay > 48 hours.     Saskia Lopez DO  12/25/2020  4:17 PM    Copy sent to Dr. Raphael Saldana MD

## 2020-12-25 NOTE — ED PROVIDER NOTES
[facility] ED  Emergency Department Encounter  EmergencyMedicine Resident     Pt Name:Ginny Rodrigues  MRN: [id number]  [place] 1940  Date of evaluation: 12/25/20  PCP:  Rafa Fuentes MD    CHIEF COMPLAINT       Chief Complaint   Patient presents with    Chest Pain    Cough       HISTORY OF PRESENT ILLNESS  (Location/Symptom, Timing/Onset, Context/Setting, Quality, Duration, Modifying Factors, Severity.)      Jong Cuevas is a 78 y.o. female who presents with 36 hours duration chest tightness and productive cough with body aches. Denies fevers, vomiting, diarrhea, abdominal pain. Lives at home alone, performs her own ADLs. Has been socially isolating with an exception of going to [employer]. Denies sick contacts or others with coronavirus symptoms. History of oral treated diabetes, treated hypertension. Denies cardiac history, history of MI, PE, DVT, stroke. Denies smoking, alcohol, street drugs. PAST MEDICAL / SURGICAL / SOCIAL / FAMILY HISTORY      has a past medical history of Arthritis, Diabetes mellitus ([facility] [address] Hyperlipidemia, Hypertension, Hypokalemia with normal acid-base balance, and Type 2 diabetes mellitus with hyperosmolarity not at goal [facility]). has a past surgical history that includes Hysterectomy; Hysterectomy, total abdominal; Tubal ligation; and eye surgery.     Social History     Socioeconomic History    Marital status:      Spouse name: Not on file    Number of children: Not on file    Years of education: Not on file    Highest education level: Not on file   Occupational History    Not on file   Social Needs    Financial resource strain: Not on file    Food insecurity     Worry: Not on file     Inability: Not on file    Transportation needs     Medical: Not on file     Non-medical: Not on file   Tobacco Use    Smoking status: Never Smoker    Smokeless tobacco: Never Used   Substance and Sexual Activity    Alcohol use: No    Drug use: No    Sexual activity: Not on file   Lifestyle    Physical activity     Days per week: Not on file     Minutes per session: Not on file    Stress: Not on file   Relationships    Social connections     Talks on phone: Not on file     Gets together: Not on file     Attends [employer] service: Not on file     Active member of club or organization: Not on file     Attends meetings of clubs or organizations: Not on file     Relationship status: Not on file    Intimate partner violence     Fear of current or ex partner: Not on file     Emotionally abused: Not on file     Physically abused: Not on file     Forced sexual activity: Not on file   Other Topics Concern    Not on file   Social History Narrative    Not on file       No family history on file. Allergies:  Atorvastatin and Codeine    Home Medications:  Prior to Admission medications    Medication Sig Start Date End Date Taking?  Authorizing Provider   lisinopril-hydroCHLOROthiazide (PRINZIDE;ZESTORETIC) 20-25 MG per tablet take 1 tablet by mouth once daily 12/7/20   Cyndi Looney MD   glipiZIDE (GLUCOTROL) 5 MG tablet Take 1 tablet by mouth 2 times daily (before meals) 10/8/20   Cyndi Looney MD   amLODIPine (NORVASC) 10 MG tablet take 1 tablet by mouth once daily 9/21/20   Cyndi Looney MD   metFORMIN (GLUCOPHAGE) 1000 MG tablet Take 1 tablet by mouth 2 times daily (with meals)  Patient taking differently: Take 1,000 mg by mouth daily (with breakfast)  5/21/20 10/8/20  Cyndi Looney MD   Omega-3 Fatty Acids (FISH OIL) 1000 MG CAPS Take 1 capsule by mouth 3 times daily  Patient taking differently: Take 1,000 mg by mouth 2 times daily  2/12/20   Cyndi Looney MD   Cholecalciferol (VITAMIN D3) 25 MCG (1000 UT) TABS Take 1 tablet by mouth daily 11/4/19   Cyndi oLoney MD   ibuprofen (ADVIL;MOTRIN) 800 MG tablet 800 mg 10/2/19   Historical Provider, MD   carvedilol (COREG) 6.25 MG tablet Take 1 tablet by mouth 2 times daily 9/19/18   Anabelle Taylor MD REVIEW OF SYSTEMS    (2-9 systems for level 4, 10 or more for level 5)      Review of Systems   Constitutional: Negative for fever. HENT: Negative for congestion. Eyes: Negative for photophobia. Respiratory: Positive for shortness of breath, chest tightness, productive cough  Cardiovascular: Negative for chest pain. Gastrointestinal: Negative for abdominal pain and vomiting. Endocrine: Negative for polyuria. Genitourinary: Negative for dysuria. Musculoskeletal: Negative for arthralgias. Skin: Negative for color change. Allergic/Immunologic: Negative for immunocompromised state. Neurological: Negative for dizziness. Hematological: Does not bruise/bleed easily. Psychiatric/Behavioral: Negative for agitation. PHYSICAL EXAM   (up to 7 for level 4, 8 or more for level 5)      INITIAL VITALS:   /64   Temp 99.5 °F (37.5 °C)   Resp 22   SpO2 94%     Physical Exam  Constitutional:       General: He/She is not in acute distress. Appearance: Normal appearance. He/She is normal weight. He/She is not toxic-appearing. HENT:      Head: Normocephalic and atraumatic. Nose: Nose normal.      Mouth/Throat: Mucous membranes are moist.  Uvula midline no oropharyngeal edema. Pharynx: Oropharynx is clear. Eyes:      Extraocular Movements: Extraocular movements intact. Conjunctiva/sclera: Conjunctivae normal.      Pupils: Pupils are equal, round, and reactive to light. Neck:      Musculoskeletal: Normal range of motion and neck supple. No neck rigidity. Cardiovascular:      Rate and Rhythm: Normal rate and regular rhythm. Pulses: Normal pulses. Heart sounds: Normal heart sounds. No murmur. Pulmonary:      Effort: Pulmonary effort is normal.      Breath sounds: Normal breath sounds. No wheezing. Wet sounding cough  Abdominal:      General: Abdomen is flat. Bowel sounds are normal.      Tenderness: There is no abdominal tenderness.    Musculoskeletal: Normal <0.03 0.00 - 0.20 k/uL    Absolute Immature Granulocyte <0.03 0.00 - 0.30 k/uL    WBC Morphology NOT REPORTED     RBC Morphology ANISOCYTOSIS PRESENT     Platelet Estimate NOT REPORTED    Basic Metabolic Panel w/ Reflex to MG   Result Value Ref Range    Glucose 287 (H) 70 - 99 mg/dL    BUN 14 8 - 23 mg/dL    CREATININE 0.73 0.50 - 0.90 mg/dL    Bun/Cre Ratio NOT REPORTED 9 - 20    Calcium 8.9 8.6 - 10.4 mg/dL    Sodium 138 135 - 144 mmol/L    Potassium 3.9 3.7 - 5.3 mmol/L    Chloride 97 (L) 98 - 107 mmol/L    CO2 28 20 - 31 mmol/L    Anion Gap 13 9 - 17 mmol/L    GFR Non-African American >60 >60 mL/min    GFR African American >60 >60 mL/min    GFR Comment          GFR Staging NOT REPORTED    Protime-INR   Result Value Ref Range    Protime 9.7 9.0 - 12.0 sec    INR 0.9    APTT   Result Value Ref Range    PTT 27.4 20.5 - 30.5 sec   Fibrinogen   Result Value Ref Range    Fibrinogen 755 (H) 140 - 420 mg/dL   Procalcitonin   Result Value Ref Range    Procalcitonin 0.08 <0.09 ng/mL   C-Reactive Protein   Result Value Ref Range    CRP 76.7 (H) 0.0 - 5.0 mg/L   Lactate Dehydrogenase   Result Value Ref Range     (H) 135 - 214 U/L   Troponin   Result Value Ref Range    Troponin, High Sensitivity 10 0 - 14 ng/L    Troponin T NOT REPORTED <0.03 ng/mL    Troponin Interp NOT REPORTED    COVID-19    Specimen: Other   Result Value Ref Range    SARS-CoV-2          SARS-CoV-2, Rapid DETECTED (A) Not Detected    Source . NASOPHARYNGEAL SWAB     SARS-CoV-2               RADIOLOGY:  Xr Chest Portable    Result Date: 12/25/2020  EXAMINATION: ONE XRAY VIEW OF THE CHEST 12/25/2020 7:48 am COMPARISON: 05/26/2019 HISTORY: ORDERING SYSTEM PROVIDED HISTORY: productive cough, chest tightness TECHNOLOGIST PROVIDED HISTORY: productive cough, chest tightness FINDINGS: No cardiomegaly or interstitial edema was noted. Subtle non consolidating pneumonia appears to be developing in both lower lobes. No pleural effusion was identified.   The regional skeleton was unremarkable. No cardiomegaly or interstitial edema. Subtle non consolidating pneumonia appears to be developing in both lower lobes. EKG  EKG Interpretation    Interpreted by me    Rhythm: normal sinus   Rate: normal  Axis: normal  Ectopy: none  Conduction: normal  ST Segments: no acute change  T Waves: no acute change  Q Waves: none    Clinical Impression: no acute changes and normal EKG    All EKG's are interpreted by the Emergency Department Physician who either signs or Co-signs this chart in the absence of a cardiologist.    EMERGENCY DEPARTMENT COURSE:  Patient is alert and oriented x3, breathing quietly and unlabored on 3 L nasal cannula. Speech is normal and speaking in full sentences without requiring to pause to take a breath. Patient does not normally wear oxygen at home this is a new oxygen requirement during the interview and physical exam the patient was SaO2 between 92 to 94% on 3 L nasal cannula, otherwise appeared well and not in any distress. Lung fields were clear throughout however she did have a wet sounding cough. Concern for respiratory infection versus MI ACS. Will do appropriate coronavirus labs and an ACS rule out. Likely admission due to new oxygen requirement. Patient tested positive for coronavirus infection, inflammatory markers are elevated. Troponins 10, EKG unremarkable. Give patient a dose of Decadron and Tessalon Perles. Patient will need admission due to new oxygen requirements. Will consult [facility]. PROCEDURES:  None    CONSULTS:  IP CONSULT TO HOSPITALIST    CRITICAL CARE:  None    FINAL IMPRESSION      1. COVID-19          DISPOSITION / PLAN     DISPOSITION Decision To Admit 12/25/2020 08:03:20 AM      PATIENT REFERRED TO:  No follow-up provider specified.     DISCHARGE MEDICATIONS:  New Prescriptions    No medications on file       Cain Rao MD  Emergency Medicine Resident    (Please note that portions of thisnote were completed with a voice recognition program.  Efforts were made to edit the dictations but occasionally words are mis-transcribed.)       Magdalene Schmidt MD  Resident  12/25/20 [id number]

## 2020-12-25 NOTE — ED NOTES
Due to COVID-19 precautions, attempted to contact patient via telephone for Advance Care Planning discussion without success.       Laura Paul, [address]  12/25/20 5723

## 2020-12-25 NOTE — ED PROVIDER NOTES
[facility]  Emergency Department  Faculty Attestation     I performed a history and physical examination of the patient and discussed management with the resident. I reviewed the residents note and agree with the documented findings and plan of care. Any areas of disagreement are noted on the chart. I was personally present for the key portions of any procedures. I have documented in the chart those procedures where I was not present during the key portions. I have reviewed the emergency nurses triage note. I agree with the chief complaint, past medical history, past surgical history, allergies, medications, social and family history as documented unless otherwise noted below. For Physician Assistant/ Nurse Practitioner cases/documentation I have personally evaluated this patient and have completed at least one if not all key elements of the E/M (history, physical exam, and MDM). Additional findings are as noted. Primary Care Physician:  Wenceslao Frazier MD    Screenings:  [unfilled]    CHIEF COMPLAINT       Chief Complaint   Patient presents with    Chest Pain    Cough       RECENT VITALS:   Temp: 99.5 °F (37.5 °C),   , Resp: 22, BP: 133/67    LABS:  Labs Reviewed - No data to display    Radiology  No orders to display         EKG:   EKG Interpretation    Interpreted by me    Rhythm: normal sinus   Rate: Tachycardia  Axis: normal  Ectopy: none  Conduction: normal  ST Segments: Subtle depression laterally and inferiorly  T Waves: Inversion high lateral, diffuse flattening  Q Waves: none    Clinical Impression: LVH, nonspecific ST T wave changes    Attending Physician Additional  Notes    Patient is been ill for the past 4 days. She had myalgias and chills, now having cough with sputum production and pleuritic chest pain. She does have fatigue. No obvious fevers. On exam she is uncomfortable, mild respiratory distress, saturations 93% on oxygen.   Lungs are clear.  Abdomen is benign. No edema cords Homans or calf tenderness. Impression is viral syndrome likely Covid pneumonia with hypoxemia. Plan is chest x-ray, cardiac work-up, laboratory studies, anticipate admission. Karan Chaidez.  Patricia Noonan MD, [facility]  Attending Emergency  Physician               Clive Conley MD  12/25/20 2901

## 2020-12-26 LAB
-: NORMAL
ABSOLUTE EOS #: <0.03 K/UL (ref 0–0.44)
ABSOLUTE IMMATURE GRANULOCYTE: 0.03 K/UL (ref 0–0.3)
ABSOLUTE LYMPH #: 1.33 K/UL (ref 1.1–3.7)
ABSOLUTE MONO #: 0.36 K/UL (ref 0.1–1.2)
ALBUMIN SERPL-MCNC: 2.8 G/DL (ref 3.5–5.2)
ANION GAP SERPL CALCULATED.3IONS-SCNC: 12 MMOL/L (ref 9–17)
BASOPHILS # BLD: 0 % (ref 0–2)
BASOPHILS ABSOLUTE: <0.03 K/UL (ref 0–0.2)
BLD PROD TYP BPU: NORMAL
BUN BLDV-MCNC: 19 MG/DL (ref 8–23)
BUN/CREAT BLD: ABNORMAL (ref 9–20)
CALCIUM SERPL-MCNC: 8.6 MG/DL (ref 8.6–10.4)
CHLORIDE BLD-SCNC: 99 MMOL/L (ref 98–107)
CO2: 24 MMOL/L (ref 20–31)
CREAT SERPL-MCNC: 0.74 MG/DL (ref 0.5–0.9)
DIFFERENTIAL TYPE: ABNORMAL
DISPENSE STATUS BLOOD BANK: NORMAL
EKG ATRIAL RATE: 104 BPM
EKG P AXIS: 53 DEGREES
EKG P-R INTERVAL: 172 MS
EKG Q-T INTERVAL: 348 MS
EKG QRS DURATION: 74 MS
EKG QTC CALCULATION (BAZETT): 457 MS
EKG R AXIS: -16 DEGREES
EKG T AXIS: 64 DEGREES
EKG VENTRICULAR RATE: 104 BPM
EOSINOPHILS RELATIVE PERCENT: 0 % (ref 1–4)
FIBRINOGEN: 712 MG/DL (ref 140–420)
GFR AFRICAN AMERICAN: >60 ML/MIN
GFR NON-AFRICAN AMERICAN: >60 ML/MIN
GFR SERPL CREATININE-BSD FRML MDRD: ABNORMAL ML/MIN/{1.73_M2}
GFR SERPL CREATININE-BSD FRML MDRD: ABNORMAL ML/MIN/{1.73_M2}
GLUCOSE BLD-MCNC: 218 MG/DL (ref 65–105)
GLUCOSE BLD-MCNC: 222 MG/DL (ref 65–105)
GLUCOSE BLD-MCNC: 251 MG/DL (ref 65–105)
GLUCOSE BLD-MCNC: 259 MG/DL (ref 65–105)
GLUCOSE BLD-MCNC: 275 MG/DL (ref 70–99)
GLUCOSE BLD-MCNC: 400 MG/DL (ref 65–105)
HCT VFR BLD CALC: 39.8 % (ref 36.3–47.1)
HEMOGLOBIN: 13.2 G/DL (ref 11.9–15.1)
IMMATURE GRANULOCYTES: 0 %
INR BLD: 1
LYMPHOCYTES # BLD: 16 % (ref 24–43)
MAGNESIUM: 2.2 MG/DL (ref 1.6–2.6)
MCH RBC QN AUTO: 28.1 PG (ref 25.2–33.5)
MCHC RBC AUTO-ENTMCNC: 33.2 G/DL (ref 28.4–34.8)
MCV RBC AUTO: 84.7 FL (ref 82.6–102.9)
MONOCYTES # BLD: 4 % (ref 3–12)
NRBC AUTOMATED: 0 PER 100 WBC
PARTIAL THROMBOPLASTIN TIME: 26.7 SEC (ref 20.5–30.5)
PDW BLD-RTO: 14.8 % (ref 11.8–14.4)
PHOSPHORUS: 2.2 MG/DL (ref 2.6–4.5)
PHOSPHORUS: ABNORMAL MG/DL (ref 2.6–4.5)
PLATELET # BLD: 230 K/UL (ref 138–453)
PLATELET ESTIMATE: ABNORMAL
PMV BLD AUTO: 11.1 FL (ref 8.1–13.5)
POTASSIUM SERPL-SCNC: 4.6 MMOL/L (ref 3.7–5.3)
PROTHROMBIN TIME: 10.3 SEC (ref 9–12)
RBC # BLD: 4.7 M/UL (ref 3.95–5.11)
RBC # BLD: ABNORMAL 10*6/UL
REASON FOR REJECTION: NORMAL
SEG NEUTROPHILS: 80 % (ref 36–65)
SEGMENTED NEUTROPHILS ABSOLUTE COUNT: 6.39 K/UL (ref 1.5–8.1)
SODIUM BLD-SCNC: 135 MMOL/L (ref 135–144)
TRANSFUSION STATUS: NORMAL
UNIT DIVISION: 0
UNIT NUMBER: NORMAL
WBC # BLD: 8.1 K/UL (ref 3.5–11.3)
WBC # BLD: ABNORMAL 10*3/UL
ZZ NTE CLEAN UP: ORDERED TEST: NORMAL
ZZ NTE WITH NAME CLEAN UP: SPECIMEN SOURCE: NORMAL

## 2020-12-26 PROCEDURE — 2700000000 HC OXYGEN THERAPY PER DAY

## 2020-12-26 PROCEDURE — 85730 THROMBOPLASTIN TIME PARTIAL: CPT

## 2020-12-26 PROCEDURE — 85384 FIBRINOGEN ACTIVITY: CPT

## 2020-12-26 PROCEDURE — 99222 1ST HOSP IP/OBS MODERATE 55: CPT | Performed by: INTERNAL MEDICINE

## 2020-12-26 PROCEDURE — 80069 RENAL FUNCTION PANEL: CPT

## 2020-12-26 PROCEDURE — 6370000000 HC RX 637 (ALT 250 FOR IP): Performed by: INTERNAL MEDICINE

## 2020-12-26 PROCEDURE — 6360000002 HC RX W HCPCS: Performed by: INTERNAL MEDICINE

## 2020-12-26 PROCEDURE — 83735 ASSAY OF MAGNESIUM: CPT

## 2020-12-26 PROCEDURE — 93010 ELECTROCARDIOGRAM REPORT: CPT | Performed by: INTERNAL MEDICINE

## 2020-12-26 PROCEDURE — 85025 COMPLETE CBC W/AUTO DIFF WBC: CPT

## 2020-12-26 PROCEDURE — 84100 ASSAY OF PHOSPHORUS: CPT

## 2020-12-26 PROCEDURE — 85610 PROTHROMBIN TIME: CPT

## 2020-12-26 PROCEDURE — 94761 N-INVAS EAR/PLS OXIMETRY MLT: CPT

## 2020-12-26 PROCEDURE — 2500000003 HC RX 250 WO HCPCS: Performed by: INTERNAL MEDICINE

## 2020-12-26 PROCEDURE — 2580000003 HC RX 258: Performed by: INTERNAL MEDICINE

## 2020-12-26 PROCEDURE — APPSS45 APP SPLIT SHARED TIME 31-45 MINUTES: Performed by: NURSE PRACTITIONER

## 2020-12-26 PROCEDURE — 99232 SBSQ HOSP IP/OBS MODERATE 35: CPT | Performed by: INTERNAL MEDICINE

## 2020-12-26 PROCEDURE — 36415 COLL VENOUS BLD VENIPUNCTURE: CPT

## 2020-12-26 PROCEDURE — 2060000000 HC ICU INTERMEDIATE R&B

## 2020-12-26 RX ADMIN — INSULIN LISPRO 4 UNITS: 100 INJECTION, SOLUTION INTRAVENOUS; SUBCUTANEOUS at 17:30

## 2020-12-26 RX ADMIN — ACETAMINOPHEN 650 MG: 325 TABLET ORAL at 12:44

## 2020-12-26 RX ADMIN — ENOXAPARIN SODIUM 40 MG: 40 INJECTION SUBCUTANEOUS at 08:50

## 2020-12-26 RX ADMIN — CARVEDILOL 6.25 MG: 6.25 TABLET, FILM COATED ORAL at 08:50

## 2020-12-26 RX ADMIN — AMLODIPINE BESYLATE 10 MG: 10 TABLET ORAL at 08:50

## 2020-12-26 RX ADMIN — REMDESIVIR 100 MG: 100 INJECTION, POWDER, LYOPHILIZED, FOR SOLUTION INTRAVENOUS at 17:23

## 2020-12-26 RX ADMIN — INSULIN LISPRO 6 UNITS: 100 INJECTION, SOLUTION INTRAVENOUS; SUBCUTANEOUS at 09:00

## 2020-12-26 RX ADMIN — DEXAMETHASONE 6 MG: 4 TABLET ORAL at 08:50

## 2020-12-26 RX ADMIN — INSULIN GLARGINE 12 UNITS: 100 INJECTION, SOLUTION SUBCUTANEOUS at 21:52

## 2020-12-26 RX ADMIN — INSULIN LISPRO 4 UNITS: 100 INJECTION, SOLUTION INTRAVENOUS; SUBCUTANEOUS at 13:33

## 2020-12-26 RX ADMIN — ACETAMINOPHEN 650 MG: 325 TABLET ORAL at 21:47

## 2020-12-26 RX ADMIN — LISINOPRIL 20 MG: 20 TABLET ORAL at 08:50

## 2020-12-26 RX ADMIN — ENOXAPARIN SODIUM 40 MG: 40 INJECTION SUBCUTANEOUS at 21:52

## 2020-12-26 RX ADMIN — Medication 1000 UNITS: at 08:50

## 2020-12-26 RX ADMIN — INSULIN LISPRO 3 UNITS: 100 INJECTION, SOLUTION INTRAVENOUS; SUBCUTANEOUS at 21:52

## 2020-12-26 RX ADMIN — CARVEDILOL 6.25 MG: 6.25 TABLET, FILM COATED ORAL at 21:47

## 2020-12-26 ASSESSMENT — PAIN DESCRIPTION - LOCATION: LOCATION: BACK

## 2020-12-26 ASSESSMENT — PAIN DESCRIPTION - DESCRIPTORS: DESCRIPTORS: ACHING;DISCOMFORT

## 2020-12-26 ASSESSMENT — PAIN SCALES - GENERAL: PAINLEVEL_OUTOF10: 4

## 2020-12-26 ASSESSMENT — PAIN DESCRIPTION - PAIN TYPE: TYPE: ACUTE PAIN

## 2020-12-26 NOTE — PLAN OF CARE
Problem: Airway Clearance - Ineffective  Goal: Achieve or maintain patent airway  12/25/2020 2337 by Bobbi Oneill RN  Outcome: Ongoing  12/25/2020 1514 by Aris Earl RN  Outcome: Ongoing     Problem: Gas Exchange - Impaired  Goal: Absence of hypoxia  12/25/2020 2337 by Bobbi Oneill RN  Outcome: Ongoing  12/25/2020 1514 by Aris Earl RN  Outcome: Ongoing  Goal: Promote optimal lung function  12/25/2020 2337 by Bobbi Oneill RN  Outcome: Ongoing  12/25/2020 1514 by Aris Earl RN  Outcome: Ongoing     Problem: Breathing Pattern - Ineffective  Goal: Ability to achieve and maintain a regular respiratory rate  12/25/2020 2337 by Bobbi Oneill RN  Outcome: Ongoing  12/25/2020 1514 by Aris Earl RN  Outcome: Ongoing     Problem:  Body Temperature -  Risk of, Imbalanced  Goal: Ability to maintain a body temperature within defined limits  12/25/2020 2337 by Bobbi Oneill RN  Outcome: Ongoing  12/25/2020 1514 by Aris Earl RN  Outcome: Ongoing  Goal: Will regain or maintain usual level of consciousness  12/25/2020 2337 by Bobbi Oneill RN  Outcome: Ongoing  12/25/2020 1514 by Aris Earl RN  Outcome: Ongoing  Goal: Complications related to the disease process, condition or treatment will be avoided or minimized  12/25/2020 2337 by Bobbi Oneill RN  Outcome: Ongoing  12/25/2020 1514 by Aris Earl RN  Outcome: Ongoing     Problem: Isolation Precautions - Risk of Spread of Infection  Goal: Prevent transmission of infection  12/25/2020 2337 by Bobbi Oneill RN  Outcome: Ongoing  12/25/2020 1514 by Aris Earl RN  Outcome: Ongoing     Problem: Nutrition Deficits  Goal: Optimize nutrtional status  12/25/2020 2337 by Bobbi Oneill RN  Outcome: Ongoing  12/25/2020 1514 by Aris Earl RN  Outcome: Ongoing     Problem: Risk for Fluid Volume Deficit  Goal: Maintain normal heart rhythm  12/25/2020 2337 by Bobbi Oneill RN  Outcome: Ongoing  12/25/2020 1514 by Valarie Cline RN  Outcome: Ongoing  Goal: Maintain absence of muscle cramping  12/25/2020 2337 by Du Guaman RN  Outcome: Ongoing  12/25/2020 1514 by Valarie Cline RN  Outcome: Ongoing  Goal: Maintain normal serum potassium, sodium, calcium, phosphorus, and pH  12/25/2020 2337 by Du Guaman RN  Outcome: Ongoing  12/25/2020 1514 by Valarie Cline RN  Outcome: Ongoing     Problem: Loneliness or Risk for Loneliness  Goal: Demonstrate positive use of time alone when socialization is not possible  12/25/2020 2337 by Du Guaman RN  Outcome: Ongoing  12/25/2020 1514 by Valarie Cline RN  Outcome: Ongoing     Problem: Fatigue  Goal: Verbalize increase energy and improved vitality  12/25/2020 2337 by Du Guaman RN  Outcome: Ongoing  12/25/2020 1514 by Valarie Cline RN  Outcome: Ongoing     Problem: Patient Education: Go to Patient Education Activity  Goal: Patient/Family Education  12/25/2020 2337 by Du Guaman RN  Outcome: Ongoing  12/25/2020 1514 by Valarie Cline RN  Outcome: Ongoing     Problem: Pain:  Goal: Pain level will decrease  Description: Pain level will decrease  12/25/2020 2337 by Du Guaman RN  Outcome: Ongoing  12/25/2020 1514 by Valarie Cline RN  Outcome: Ongoing  Goal: Control of acute pain  Description: Control of acute pain  12/25/2020 2337 by Du Guaman RN  Outcome: Ongoing  12/25/2020 1514 by Valarie Cline RN  Outcome: Ongoing  Goal: Control of chronic pain  Description: Control of chronic pain  12/25/2020 2337 by Du Guaman RN  Outcome: Ongoing  12/25/2020 1514 by Valarie Cline RN  Outcome: Ongoing     Problem: Falls - Risk of:  Goal: Will remain free from falls  Description: Will remain free from falls  12/25/2020 2337 by Du Guaman RN  Outcome: Ongoing  12/25/2020 1514 by Valarie Cline RN  Outcome: Ongoing  Goal: Absence of physical injury  Description: Absence of physical injury  12/25/2020 2337 by Angela Shields RN  Outcome: Ongoing  12/25/2020 1514 by Viet Muller RN  Outcome: Ongoing

## 2020-12-26 NOTE — PROGRESS NOTES
[facility]  Office: [address], DO, Melissagali Steele, DO, Arturo Lites, DO, Ly Heller, DO, Veena Garcia MD, Justina Sweeney MD, Emiliano Lu MD, Irene Hung MD, Alexandria Yun MD, Alex Pappas MD, Flora Villanueva MD, Nyla Briggs MD, Christian Mohamud MD, Maddie Blanco DO, Lenka Mckinley MD, Nahomi Brooks MD, Bonifacio Smith, DO, Liana Leach MD,  Jigna Cruz DO, Gricel Medina MD, Mumtaz Briggs MD, Shubham Chapin [facility], [facility] Ginny, CNP, Daina Ren, CNP, Gardenia Ya, CNS, Titi Arzola, CNP, Jennifer Houston, CNP, Jorge Mike, CNP, Gloria Palacios, CNP, Vladimir Hyatt, CNP, Adolph Don PA-C, Jimmy Ag, [facility], Esther Fontaine, CNP, Wild Young, CNP, Ally Santos, CNP, Esperanza Quigley, CNP, Vianney Ruelas, [facility]   [address] [facility]    Progress Note    12/26/2020    4:54 PM    Name:   Indira Virgen  MRN:     [id number]     Acct:      [de-identified]   Room:   [facility]/3017-01   Day:  1  Admit Date:  12/25/2020  6:49 AM    PCP:   Francine Sibley MD  Code Status:  Full Code    Subjective:     C/C:   Chief Complaint   Patient presents with    Chest Pain    Cough     Interval History Status: improved. Patient overall feels better, continues to require 5 to 6 L nasal cannula oxygen. Reports less pain and congestion with cough. Denies any nausea or vomiting. Sense of taste has improved. Brief History: This is an 64-year-old Afro-American female presents with a complaint of chest pressure, cough and increasing fatigue. Her symptoms started this past Monday and have progressively worsened throughout the week. She presented here and was found to have evidence of COVID-19 pneumonia is been admitted and started on Decadron, remdesivir and received 1 dose of convalescent plasma.     Review of Systems:     Constitutional:  negative for chills, fevers, sweats  Respiratory: Positive for cough, dyspnea on exertion, shortness of breath, good for wheezing  Cardiovascular:  negative for chest pain, chest pressure/discomfort, lower extremity edema, palpitations  Gastrointestinal:  negative for abdominal pain, constipation, diarrhea, nausea, vomiting  Neurological:  negative for dizziness, headache    Medications: Allergies: Allergies   Allergen Reactions    Atorvastatin      Severe myopathy    Codeine        Current Meds:   Scheduled Meds:    dexamethasone  6 mg Oral Daily    amLODIPine  10 mg Oral Daily    carvedilol  6.25 mg Oral BID    Vitamin D  1,000 Units Oral Daily    [Held by provider] glipiZIDE  5 mg Oral BID AC    lisinopril  20 mg Oral Daily    [Held by provider] metFORMIN  1,000 mg Oral Daily with breakfast    enoxaparin  40 mg Subcutaneous BID    insulin lispro  0-12 Units Subcutaneous TID WC    insulin lispro  0-6 Units Subcutaneous Nightly    insulin glargine  12 Units Subcutaneous Nightly    remdesivir IVPB  100 mg Intravenous Q24H     Continuous Infusions:    dextrose      sodium chloride       PRN Meds: benzonatate, potassium chloride **OR** potassium alternative oral replacement **OR** potassium chloride, magnesium sulfate, promethazine **OR** ondansetron, polyethylene glycol, nicotine, acetaminophen **OR** acetaminophen, glucose, dextrose, glucagon (rDNA), dextrose, sodium chloride, sodium chloride    Data:     Past Medical History:   has a past medical history of Arthritis, Diabetes mellitus ([address] Hyperlipidemia, Hypertension, Hypokalemia with normal acid-base balance, Pneumonia due to COVID-19 virus, and Type 2 diabetes mellitus with hyperosmolarity not at goal [facility]). Social History:   reports that she has never smoked. She has never used smokeless tobacco. She reports that she does not drink alcohol or use drugs. Family History: History reviewed. No pertinent family history.     Vitals:  /61   Pulse 68   Temp 98.4 °F (36.9 °C) (Oral)   Resp 24   Ht 5' 4\" (1.626 m)   Wt 183 lb 3.2 oz (83.1 kg)   SpO2 (!) 89%   BMI 31.45 kg/m²   Temp (24hrs), Av.2 °F (36.8 °C), Min:97.7 °F (36.5 °C), Max:98.6 °F (37 °C)    Recent Labs     20  15420  0845 20  1121   POCGLU 320* 400* 259* 222*       I/O (24Hr): Intake/Output Summary (Last 24 hours) at 2020 1654  Last data filed at 2020 0900  Gross per 24 hour   Intake 820 ml   Output 850 ml   Net -30 ml       Labs:  Hematology:  Recent Labs     20   WBC 6.7 8.1   RBC 4.47 4.70   HGB 12.8 13.2   HCT 39.5 39.8   MCV 88.4 84.7   MCH 28.6 28.1   MCHC 32.4 33.2   RDW 14.9* 14.8*    230   MPV 11.4 11.1   CRP 76.7*  --    INR 0.9 1.0     Chemistry:  Recent Labs     20  0757    135  --    K 3.9 4.6  --    CL 97* 99  --    CO2 28 24  --    GLUCOSE 287* 275*  --    BUN 14 19  --    CREATININE 0.73 0.74  --    MG  --   --  2.2   ANIONGAP 13 12  --    LABGLOM >60 >60  --    GFRAA >60 >60  --    CALCIUM 8.9 8.6  --    PHOS  --  Unable to perform testing: Specimen quantity not sufficient.  2.2*   TROPHS 10  --   --      Recent Labs     20  [id number] 20  1121   PROT 7.3  --   --   --   --   --    LABALBU 3.3*  --   --  2.8*  --   --    AST 21  --   --   --   --   --    ALT 8  --   --   --   --   --    *  --   --   --   --   --    ALKPHOS 106*  --   --   --   --   --    BILITOT 0.26*  --   --   --   --   --    BILIDIR 0.09  --   --   --   --   --    POCGLU  --  320* 400*  --  259* 222*     ABG:No results found for: POCPH, PHART, PH, POCPCO2, YWT0MVE, PCO2, POCPO2, PO2ART, PO2, POCHCO3, NNH9WCG, HCO3, NBEA, PBEA, BEART, BE, THGBART, THB, AFH7FCV, ULCE9KXX, K9ZTPXTH, O2SAT, FIO2  Lab Results   Component Value Date/Time    SPECIAL NOT REPORTED 2019 08:34 AM     No results found for: CULTURE    Radiology:  Xr Chest Portable    Result Date:

## 2020-12-26 NOTE — CARE COORDINATION
Case Management Initial Discharge Plan  [place],         +covid  Called patient to verify information    . Information verified: address, contacts, phone number, , insurance Yes    Emergency Contact/Next of Kin name & number: son matheus orozcoer Evelyn Bhandari    PCP: Cyndi Looney MD  Date of last visit: call [phone]    Insurance Provider: [place] medicare    Discharge Planning    Living Arrangements:  Alone   Support Systems:  Son lives 10 mins away, brother lives around the corner    Home has 1 stories  1 stairs to climb to get into front door  Patient able to perform ADL's:Independent    Current Services (outpatient & in home) none  DME equipment: none  [facility]acy: [facility]    Receiving oral anticoagulation therapy? No          Potential Assistance Needed:  Oxygen, home care         Prior SNF/Rehab Placement and Facility: none       Evaluation: no    Expected Discharge date:  20    Patient expects to be discharged to:  home  Follow Up Appointment: Best Day/ Time:  afternoon      Transportation arrangements needed for discharge: No    Readmission Risk              Risk of Unplanned Readmission:        9             Does patient have a readmission risk score greater than 14?: No  If yes, follow-up appointment must be made within 7 days of discharge.      Goals of Care: return to adl without shortness of breath      Discharge Plan: goal return home           Electronically signed by Karyn Jones RN on 20 at 3:29 PM EST

## 2020-12-26 NOTE — CONSULTS
[facility] - Initial Consult Note COVID 19 Patient  Today's Date and Time: 12/26/2020, 12:18 PM    Impression :   · COVID 19 Suspect  · COVID 19 Confirmed Infection-12/25/2020  · Covid tests:Positive:  12/25/2020  · Elevated inflammatory markers   · Productive cough  · Acute Hypoxic respiratory failure: On 6 L nasal cannula    Recommendations:   · Monitor off antibiotics  · Clinical Research will approach patient to explore if she qualifies for any of the COVID 19 treatment protocols. · Remdesivir- therapy started 12/25/2020  · Convalescent Plasma-1 unit given 12/25/2020  · Decadron 6 mg daily x10 days started 12/25/2020    Medical Decision Making/Summary/Discussion:12/26/2020     · Patient admitted with suspected COVID 19 infection  · Covid test confirmed positive. 12/25/2020  Infection Control Recommendations   · Universal Precautions  · Airborne isolation  · Droplet Isolation    Antimicrobial Stewardship Recommendations     No antimicrobial therapy needed at this time  Coordination of Outpatient Care:   · Estimated Length of IV antimicrobials:TBD  · Patient will need Midline Catheter Insertion: TBD  · Patient will need PICC line Insertion: No  · Patient will need: Home IV , [place],  SNF,  LTAC:TBD  · Patient will need outpatient wound care:No    Chief complaint/reason for consultation:   · Concern for COVID infection      History of Present Illness:   Ariel Klinefelter is a 78y.o.-year-old -American female with a past medical history of diabetes mellitus type 2, hypertension, hyperlipidemia, and arthritis who was initially admitted on 12/25/2020. Patient seen at the request of Dr. Nandini Martinez. INITIAL HISTORY:    Patient presented through ER with complaints of fatigue, myalgias and chills x4 days, with newer onset cough with yellow sputum production and pleuritic chest pain x36 hours. She denied fever, diarrhea, vomiting, or abdominal pain.     Patient received 1 unit of plasma, as well as remdesivir and Decadron therapy 12/25/2020. She was admitted to [facility] ICU placed on 6 L nasal cannula. Patient admitted because of concerns with COVID 19.    CURRENT EVALUATION : 12/26/2020    Afebrile: 98.4  VS stable    Patient exhibiting respiratory distress. With exertion  Respiratory secretions: Thin yellow with cough    Patient receiving supplemental oxygen. 6 L nasal cannula  02 sat: 90 to 94%  RR: 20-24        NEWS Score: 0-4 Low risk group; 5-6: Medium risk group; 7 or above: High risk group  Parameters 3 2 1 0 1 2 3   Age    < 65   ? 65   RR ? 8  9-11 12-20  21-24 ? 25   O2 Sats ? 91 92-93 94-95 ? 96      Suppl O2  Yes  No      SBP ? 90  101-110 111-219   ? 220   HR ? 40  41-50 51-90  111-130 ? 131   Consciousness    Alert   Drowsiness, lethargy, or confusion   Temperature ? 35.0 C (95.0 F)  35.1-36.0 C 95.1-96.9 F 36.1-38.0 C 97.0-100.4 F 38.1-39.0 C 100.5-102.3 F ? 39.1 C ? 102.4 F      NEWS Score: 9  · High risk    Overall Daily Picture:    Unchanged    Presence of secondary bacterial Infection:  No   Additional antibiotics: No    Labs, X rays reviewed: 12/26/2020    BUN: 19  Cr: 0.74    WBC: 8.1  Hb: 13.2  Plat: 230    Absolute Neutrophils: 6.39  Absolute Lymphocytes: 1.33  Neutrophil/Lymphocyte Ratio: 4.8    CRP: 76 .7  Ferritin: 152  LDH: 389    Pro Calcitonin: 0.08      Cultures:  Urine:  ·   Blood:  ·   Sputum :  ·   Wound:       CXR:     12/25/2020  No cardiomegaly or interstitial edema.       Subtle non consolidating pneumonia appears to be developing in both lower   lobes             Discussed with patient, RN, CC, IM. I have personally reviewed the past medical history, past surgical history, medications, social history, and family history, and I have updated the database accordingly.   Past Medical History:     Past Medical History:   Diagnosis Date    Arthritis     Diabetes mellitus ([facility] [address]     Hyperlipidemia 11/9/2016    Hypertension     Hypokalemia with normal acid-base balance 9/13/2016    Pneumonia due to COVID-19 virus 12/25/2020    Type 2 diabetes mellitus with hyperosmolarity not at goal [facility]) 9/13/2016       Past Surgical  History:     Past Surgical History:   Procedure Laterality Date    EYE SURGERY      HYSTERECTOMY      HYSTERECTOMY, TOTAL ABDOMINAL      TUBAL LIGATION         Medications:      dexamethasone  6 mg Oral Daily    amLODIPine  10 mg Oral Daily    carvedilol  6.25 mg Oral BID    Vitamin D  1,000 Units Oral Daily    [Held by provider] glipiZIDE  5 mg Oral BID AC    lisinopril  20 mg Oral Daily    [Held by provider] metFORMIN  1,000 mg Oral Daily with breakfast    enoxaparin  40 mg Subcutaneous BID    insulin lispro  0-12 Units Subcutaneous TID WC    insulin lispro  0-6 Units Subcutaneous Nightly    insulin glargine  12 Units Subcutaneous Nightly    remdesivir IVPB  100 mg Intravenous Q24H       Social History:     Social History     Socioeconomic History    Marital status:      Spouse name: Not on file    Number of children: Not on file    Years of education: Not on file    Highest education level: Not on file   Occupational History    Not on file   Social Needs    Financial resource strain: Not on file    Food insecurity     Worry: Not on file     Inability: Not on file    Transportation needs     Medical: Not on file     Non-medical: Not on file   Tobacco Use    Smoking status: Never Smoker    Smokeless tobacco: Never Used   Substance and Sexual Activity    Alcohol use: No    Drug use: No    Sexual activity: Not on file   Lifestyle    Physical activity     Days per week: Not on file     Minutes per session: Not on file    Stress: Not on file   Relationships    Social connections     Talks on phone: Not on file     Gets together: Not on file     Attends Yarsanism service: Not on file     Active member of club or organization: Not on file     Attends meetings of clubs or organizations: Not on file     Relationship status: Not on file    Intimate partner violence     Fear of current or ex partner: Not on file     Emotionally abused: Not on file     Physically abused: Not on file     Forced sexual activity: Not on file   Other Topics Concern    Not on file   Social History Narrative    Not on file       Family History:   History reviewed. No pertinent family history. Allergies:   Atorvastatin and Codeine     Review of Systems:     Patient assessed 12/26/2020  Constitutional: No fevers or chills. No systemic complaints  Head: No headaches  Eyes: No double vision or blurry vision. No conjunctival inflammation. ENT: No sore throat or runny nose. . No hearing loss, tinnitus or vertigo. Cardiovascular: No chest pain or palpitations. No Shortness of breath. Positive BEARD  Lung: Positive cough with sputum production  Abdomen: No nausea, vomiting, diarrhea, or abdominal pain. Kellen Dues No cramps. Genitourinary: No increased urinary frequency, or dysuria. No hematuria. No suprapubic or CVA pain  Musculoskeletal: No muscle aches or pains. No joint effusions, swelling or deformities  Hematologic: No bleeding or bruising. Neurologic: No headache, weakness, numbness, or tingling. Integument: No rash, no ulcers. Psychiatric: No depression. Endocrine: No polyuria, no polydipsia, no polyphagia. Physical Examination :     Patient Vitals for the past 8 hrs:   BP Temp Temp src Pulse Resp SpO2   12/26/20 1128 108/61 98.4 °F (36.9 °C) Oral 68 24 (!) 89 %   12/26/20 0846 121/61 98.6 °F (37 °C) Oral 74 17 (!) 89 %     General Appearance: Awake, alert, and in no apparent distress  Head:  Normocephalic, no trauma  Eyes: Pupils equal, round, reactive to light; sclera anicteric; conjunctivae pink. No embolic phenomena. ENT: Oropharynx clear, without erythema, exudate, or thrush. No tenderness of sinuses. Mouth/throat: mucosa pink and moist. No lesions. Dentition in good repair. Neck:Supple, without lymphadenopathy. Thyroid normal, No bruits. Pulmonary/Chest: Clear to auscultation, without wheezes, rales, or rhonchi. No dullness to percussion. Cardiovascular: Regular rate and rhythm without murmurs, rubs, or gallops. Abdomen: Soft, non tender. Bowel sounds normal. No organomegaly  All four Extremities: No cyanosis, clubbing, edema, or effusions. Neurologic: No gross sensory or motor deficits. Skin: Warm and dry with good turgor. No signs of peripheral arterial or venous insufficiency. No ulcerations. No open wounds. Medical Decision Making -Laboratory:   I have independently reviewed/ordered the following labs:    CBC with Differential:   Recent Labs     12/25/20  0722 12/26/20 0624   WBC 6.7 8.1   HGB 12.8 13.2   HCT 39.5 39.8    230   LYMPHOPCT 24 16*   MONOPCT 4 4     BMP:   Recent Labs     12/25/20  0722 12/26/20  0624 12/26/20  0757    135  --    K 3.9 4.6  --    CL 97* 99  --    CO2 28 24  --    BUN 14 19  --    CREATININE 0.73 0.74  --    MG  --   --  2.2     Hepatic Function Panel:   Recent Labs     12/25/20  0722 12/26/20  0624   PROT 7.3  --    LABALBU 3.3* 2.8*   BILIDIR 0.09  --    IBILI 0.17  --    BILITOT 0.26*  --    ALKPHOS 106*  --    ALT 8  --    AST 21  --      No results for input(s): RPR in the last 72 hours. No results for input(s): HIV in the last 72 hours. No results for input(s): BC in the last 72 hours. Lab Results   Component Value Date    RBC 4.70 12/26/2020    WBC 8.1 12/26/2020     Lab Results   Component Value Date    CREATININE 0.74 12/26/2020    GLUCOSE 275 12/26/2020       Medical Decision Making-Imaging:   Xr Chest Portable     Result Date: 12/25/2020  EXAMINATION: ONE XRAY VIEW OF THE CHEST 12/25/2020 7:48 am COMPARISON: 05/26/2019 HISTORY: ORDERING SYSTEM PROVIDED HISTORY: productive cough, chest tightness TECHNOLOGIST PROVIDED HISTORY: productive cough, chest tightness FINDINGS: No cardiomegaly or interstitial edema was noted.   Subtle non consolidating pneumonia appears to be developing in both lower lobes. No pleural effusion was identified. The regional skeleton was unremarkable.      No cardiomegaly or interstitial edema. Subtle non consolidating pneumonia appears to be developing in both lower lobes.        Medical Decision [id number]:       Medical Decision Making-Other:     Note:  · Labs, medications, radiologic studies were reviewed with personal review of films  · Moderate Large amounts of data were reviewed  · Discussed with nursing Staff, Discharge planner  · Infection Control and Prevention measures reviewed  · All prior entries were reviewed  · Administer medications as ordered  · Prognosis: Guarded  · Discharge planning reviewed  · Follow up as outpatient. Thank you for allowing us to participate in the care of this patient. Please call with questions. Ana Laura Parson, APRN - CNP     ATTESTATION:    I have discussed the case, including pertinent history and exam findings with the APRN. I have evaluated the  History, physical findings and pictures of the patient and the key elements of the encounter have been performed by me. I have reviewed the laboratory data, other diagnostic studies and discussed them with the APRN. I have updated the medical record where necessary. I agree with the assessment, plan and orders as documented by the APRN.     Jack Hendrix MD.      Pager: [phone] - Office: [phone]

## 2020-12-27 LAB
ABSOLUTE EOS #: 0 K/UL (ref 0–0.4)
ABSOLUTE IMMATURE GRANULOCYTE: 0.1 K/UL (ref 0–0.3)
ABSOLUTE LYMPH #: 0.93 K/UL (ref 1–4.8)
ABSOLUTE MONO #: 0.31 K/UL (ref 0.1–0.8)
ALBUMIN SERPL-MCNC: 3 G/DL (ref 3.5–5.2)
ANION GAP SERPL CALCULATED.3IONS-SCNC: 14 MMOL/L (ref 9–17)
BASOPHILS # BLD: 0 % (ref 0–2)
BASOPHILS ABSOLUTE: 0 K/UL (ref 0–0.2)
BUN BLDV-MCNC: 21 MG/DL (ref 8–23)
BUN/CREAT BLD: ABNORMAL (ref 9–20)
CALCIUM SERPL-MCNC: 8.4 MG/DL (ref 8.6–10.4)
CHLORIDE BLD-SCNC: 101 MMOL/L (ref 98–107)
CO2: 24 MMOL/L (ref 20–31)
CREAT SERPL-MCNC: 0.67 MG/DL (ref 0.5–0.9)
DIFFERENTIAL TYPE: ABNORMAL
EOSINOPHILS RELATIVE PERCENT: 0 % (ref 1–4)
FIBRINOGEN: 555 MG/DL (ref 140–420)
GFR AFRICAN AMERICAN: >60 ML/MIN
GFR NON-AFRICAN AMERICAN: >60 ML/MIN
GFR SERPL CREATININE-BSD FRML MDRD: ABNORMAL ML/MIN/{1.73_M2}
GFR SERPL CREATININE-BSD FRML MDRD: ABNORMAL ML/MIN/{1.73_M2}
GLUCOSE BLD-MCNC: 200 MG/DL (ref 65–105)
GLUCOSE BLD-MCNC: 205 MG/DL (ref 70–99)
GLUCOSE BLD-MCNC: 231 MG/DL (ref 65–105)
GLUCOSE BLD-MCNC: 232 MG/DL (ref 65–105)
GLUCOSE BLD-MCNC: 247 MG/DL (ref 65–105)
HCT VFR BLD CALC: 39.7 % (ref 36.3–47.1)
HEMOGLOBIN: 13 G/DL (ref 11.9–15.1)
IMMATURE GRANULOCYTES: 1 %
INR BLD: 1.1
LYMPHOCYTES # BLD: 9 % (ref 24–44)
MAGNESIUM: 2.4 MG/DL (ref 1.6–2.6)
MCH RBC QN AUTO: 28 PG (ref 25.2–33.5)
MCHC RBC AUTO-ENTMCNC: 32.7 G/DL (ref 28.4–34.8)
MCV RBC AUTO: 85.6 FL (ref 82.6–102.9)
MONOCYTES # BLD: 3 % (ref 1–7)
MORPHOLOGY: ABNORMAL
NRBC AUTOMATED: 0.2 PER 100 WBC
PARTIAL THROMBOPLASTIN TIME: 25.5 SEC (ref 20.5–30.5)
PDW BLD-RTO: 15 % (ref 11.8–14.4)
PHOSPHORUS: 2.3 MG/DL (ref 2.6–4.5)
PLATELET # BLD: 280 K/UL (ref 138–453)
PLATELET ESTIMATE: ABNORMAL
PMV BLD AUTO: 11.4 FL (ref 8.1–13.5)
POTASSIUM SERPL-SCNC: 4.1 MMOL/L (ref 3.7–5.3)
PROTHROMBIN TIME: 11.2 SEC (ref 9–12)
RBC # BLD: 4.64 M/UL (ref 3.95–5.11)
RBC # BLD: ABNORMAL 10*6/UL
SEG NEUTROPHILS: 87 % (ref 36–66)
SEGMENTED NEUTROPHILS ABSOLUTE COUNT: 8.96 K/UL (ref 1.8–7.7)
SODIUM BLD-SCNC: 139 MMOL/L (ref 135–144)
WBC # BLD: 10.3 K/UL (ref 3.5–11.3)
WBC # BLD: ABNORMAL 10*3/UL

## 2020-12-27 PROCEDURE — 85025 COMPLETE CBC W/AUTO DIFF WBC: CPT

## 2020-12-27 PROCEDURE — 2500000003 HC RX 250 WO HCPCS: Performed by: INTERNAL MEDICINE

## 2020-12-27 PROCEDURE — 83735 ASSAY OF MAGNESIUM: CPT

## 2020-12-27 PROCEDURE — 99232 SBSQ HOSP IP/OBS MODERATE 35: CPT | Performed by: INTERNAL MEDICINE

## 2020-12-27 PROCEDURE — 2580000003 HC RX 258: Performed by: INTERNAL MEDICINE

## 2020-12-27 PROCEDURE — 6370000000 HC RX 637 (ALT 250 FOR IP): Performed by: INTERNAL MEDICINE

## 2020-12-27 PROCEDURE — 82947 ASSAY GLUCOSE BLOOD QUANT: CPT

## 2020-12-27 PROCEDURE — 85730 THROMBOPLASTIN TIME PARTIAL: CPT

## 2020-12-27 PROCEDURE — 6360000002 HC RX W HCPCS: Performed by: INTERNAL MEDICINE

## 2020-12-27 PROCEDURE — 36415 COLL VENOUS BLD VENIPUNCTURE: CPT

## 2020-12-27 PROCEDURE — 2060000000 HC ICU INTERMEDIATE R&B

## 2020-12-27 PROCEDURE — 85610 PROTHROMBIN TIME: CPT

## 2020-12-27 PROCEDURE — 80069 RENAL FUNCTION PANEL: CPT

## 2020-12-27 PROCEDURE — 85384 FIBRINOGEN ACTIVITY: CPT

## 2020-12-27 RX ADMIN — INSULIN GLARGINE 12 UNITS: 100 INJECTION, SOLUTION SUBCUTANEOUS at 21:03

## 2020-12-27 RX ADMIN — LISINOPRIL 20 MG: 20 TABLET ORAL at 09:11

## 2020-12-27 RX ADMIN — REMDESIVIR 100 MG: 100 INJECTION, POWDER, LYOPHILIZED, FOR SOLUTION INTRAVENOUS at 17:52

## 2020-12-27 RX ADMIN — INSULIN LISPRO 4 UNITS: 100 INJECTION, SOLUTION INTRAVENOUS; SUBCUTANEOUS at 12:30

## 2020-12-27 RX ADMIN — ENOXAPARIN SODIUM 40 MG: 40 INJECTION SUBCUTANEOUS at 21:03

## 2020-12-27 RX ADMIN — INSULIN LISPRO 4 UNITS: 100 INJECTION, SOLUTION INTRAVENOUS; SUBCUTANEOUS at 09:10

## 2020-12-27 RX ADMIN — INSULIN LISPRO 2 UNITS: 100 INJECTION, SOLUTION INTRAVENOUS; SUBCUTANEOUS at 21:03

## 2020-12-27 RX ADMIN — CARVEDILOL 6.25 MG: 6.25 TABLET, FILM COATED ORAL at 21:04

## 2020-12-27 RX ADMIN — INSULIN LISPRO 4 UNITS: 100 INJECTION, SOLUTION INTRAVENOUS; SUBCUTANEOUS at 17:00

## 2020-12-27 RX ADMIN — DEXAMETHASONE 6 MG: 4 TABLET ORAL at 09:11

## 2020-12-27 RX ADMIN — GLIPIZIDE 5 MG: 5 TABLET ORAL at 17:52

## 2020-12-27 RX ADMIN — ENOXAPARIN SODIUM 40 MG: 40 INJECTION SUBCUTANEOUS at 09:11

## 2020-12-27 RX ADMIN — CARVEDILOL 6.25 MG: 6.25 TABLET, FILM COATED ORAL at 09:11

## 2020-12-27 RX ADMIN — Medication 1000 UNITS: at 09:11

## 2020-12-27 RX ADMIN — AMLODIPINE BESYLATE 10 MG: 10 TABLET ORAL at 09:11

## 2020-12-27 ASSESSMENT — PAIN SCALES - GENERAL: PAINLEVEL_OUTOF10: 0

## 2020-12-27 NOTE — PLAN OF CARE
Problem: Airway Clearance - Ineffective  Goal: Achieve or maintain patent airway  12/26/2020 2356 by Hilary Carpenter RN  Outcome: Ongoing  12/26/2020 2015 by Edwin Stroud RN  Outcome: Ongoing     Problem: Gas Exchange - Impaired  Goal: Absence of hypoxia  12/26/2020 2356 by Hilary Carpenter RN  Outcome: Ongoing  12/26/2020 2015 by Edwin Stroud RN  Outcome: Ongoing  Goal: Promote optimal lung function  12/26/2020 2356 by Hilary Carpenter RN  Outcome: Ongoing  12/26/2020 2015 by Edwin Stroud RN  Outcome: Ongoing     Problem: Breathing Pattern - Ineffective  Goal: Ability to achieve and maintain a regular respiratory rate  12/26/2020 2356 by Hilary Carpenter RN  Outcome: Ongoing  12/26/2020 2015 by Edwin Stroud RN  Outcome: Ongoing     Problem:  Body Temperature -  Risk of, Imbalanced  Goal: Ability to maintain a body temperature within defined limits  12/26/2020 2356 by Hilary Carpenter RN  Outcome: Ongoing  12/26/2020 2015 by Edwin Stroud RN  Outcome: Ongoing  Goal: Will regain or maintain usual level of consciousness  12/26/2020 2356 by Hilary Carpenter RN  Outcome: Ongoing  12/26/2020 2015 by Edwin Stroud RN  Outcome: Ongoing  Goal: Complications related to the disease process, condition or treatment will be avoided or minimized  12/26/2020 2356 by Hilary Carpenter RN  Outcome: Ongoing  12/26/2020 2015 by Edwin Stroud RN  Outcome: Ongoing     Problem: Isolation Precautions - Risk of Spread of Infection  Goal: Prevent transmission of infection  12/26/2020 2356 by Hilary Carpenter RN  Outcome: Ongoing  12/26/2020 2015 by Edwin Stroud RN  Outcome: Ongoing     Problem: Nutrition Deficits  Goal: Optimize nutrtional status  12/26/2020 2356 by Hilary Carpenter RN  Outcome: Ongoing  12/26/2020 2015 by Edwin Stroud RN  Outcome: Ongoing     Problem: Risk for Fluid Volume Deficit  Goal: Maintain normal heart rhythm  12/26/2020 2356 by Hilary Carpenter RN  Outcome: Ongoing  12/26/2020 2015 by Edwin Stroud RN  Outcome: Ongoing

## 2020-12-27 NOTE — PROGRESS NOTES
[facility]  Office: [address], DO, Shobha Alert, DO, Harsh Nathan, DO, Ladonna Foreman Blood, DO, Carmen Hinkle MD, Joyce Ingram MD, Flora Harmon MD, Jose Ortega MD, Cirilo Billy MD, Jessi Link MD, Ricky Hathaway MD, Catalino Jack MD, Mbcarrie Kay MD, Lisbeth Patton DO, Anthony Luna MD, Macario Inman MD, César Moran DO, Lady Rachele MD,  Pura Sidhu DO, Zelalem Barbour MD, Alfredo Hillman MD, Eugenio Mendoza, [facility], [facility], CNP, Emma Spivey, CNP, Omari Smith, CNS, To Martinez, CNP, Yesica Najera, CNP, Karissa Medina, CNP, Coty Sanford, CNP, Edwar Gray, CNP, Titi Post PA-C, Rick Daily, [facility], Nithin Escalera, CNP, Merly Shepherd, CNP, Belle Quintana, CNP, Saurabh Tobin, CNP, Sarah Boyd, [place] [id number] [facility]    Progress Note    12/27/2020    10:53 AM    Name:   Harjit De Santiago  MRN:     [id number]     Acct:      [de-identified]   Room:   [address] [place] [address] Day:  2  Admit Date:  12/25/2020  6:49 AM    PCP:   Kaylie Delcid MD  Code Status:  Full Code    Subjective:     C/C:   Chief Complaint   Patient presents with    Chest Pain    Cough     Interval History Status: improved. Patient reports that she is less short of breath and has no further pain. Brief History: This is an 68-year-old Afro-American female presents with a complaint of chest pressure, cough and increasing fatigue. Her symptoms started this past Monday and have progressively worsened throughout the week. She presented here and was found to have evidence of COVID-19 pneumonia is been admitted and started on Decadron, remdesivir and received 1 dose of convalescent plasma.     Review of Systems:     Constitutional:  negative for chills, fevers, sweats  Respiratory: Positive for cough, congestion, negative for dyspnea on exertion, shortness of breath, wheezing  Cardiovascular:  negative for chest pain, chest pressure/discomfort, lower extremity edema, palpitations  Gastrointestinal:  negative for abdominal pain, constipation, diarrhea, nausea, vomiting  Neurological:  negative for dizziness, headache    Medications: Allergies: Allergies   Allergen Reactions    Atorvastatin      Severe myopathy    Codeine        Current Meds:   Scheduled Meds:    dexamethasone  6 mg Oral Daily    amLODIPine  10 mg Oral Daily    carvedilol  6.25 mg Oral BID    Vitamin D  1,000 Units Oral Daily    [Held by provider] glipiZIDE  5 mg Oral BID AC    lisinopril  20 mg Oral Daily    [Held by provider] metFORMIN  1,000 mg Oral Daily with breakfast    enoxaparin  40 mg Subcutaneous BID    insulin lispro  0-12 Units Subcutaneous TID WC    insulin lispro  0-6 Units Subcutaneous Nightly    insulin glargine  12 Units Subcutaneous Nightly    remdesivir IVPB  100 mg Intravenous Q24H     Continuous Infusions:    dextrose      sodium chloride       PRN Meds: benzonatate, potassium chloride **OR** potassium alternative oral replacement **OR** potassium chloride, magnesium sulfate, promethazine **OR** ondansetron, polyethylene glycol, nicotine, acetaminophen **OR** acetaminophen, glucose, dextrose, glucagon (rDNA), dextrose, sodium chloride, sodium chloride    Data:     Past Medical History:   has a past medical history of Arthritis, Diabetes mellitus ([address] Hyperlipidemia, Hypertension, Hypokalemia with normal acid-base balance, Pneumonia due to COVID-19 virus, and Type 2 diabetes mellitus with hyperosmolarity not at goal [facility]). Social History:   reports that she has never smoked. She has never used smokeless tobacco. She reports that she does not drink alcohol or use drugs. Family History: History reviewed. No pertinent family history.     Vitals:  /73   Pulse 62   Temp 98 °F (36.7 °C) (Axillary)   Resp 20   Ht 5' 4\" (1.626 m)   Wt 185 lb 6.4 oz (84.1 kg)   SpO2 96%   BMI 31.82 kg/m²   Temp (24hrs), Av.2 °F (36.8 °C), Min:97.9 °F (36.6 °C), Max:98.4 °F (36.9 °C)    Recent Labs     12/26/20  1121 12/26/20  1720 12/26/20 2142 12/27/20  0907   POCGLU 222* 218* 251* 200*       I/O (24Hr): Intake/Output Summary (Last 24 hours) at 12/27/2020 1053  Last data filed at 12/26/2020 2147  Gross per 24 hour   Intake 950 ml   Output 600 ml   Net 350 ml       Labs:  Hematology:  Recent Labs     12/25/20  0722 12/26/20  0624 12/27/20  0529   WBC 6.7 8.1 10.3   RBC 4.47 4.70 4.64   HGB 12.8 13.2 13.0   HCT 39.5 39.8 39.7   MCV 88.4 84.7 85.6   MCH 28.6 28.1 28.0   MCHC 32.4 33.2 32.7   RDW 14.9* 14.8* 15.0*    230 280   MPV 11.4 11.1 11.4   CRP 76.7*  --   --    INR 0.9 1.0 1.1     Chemistry:  Recent Labs     12/25/20 0722 12/26/20 0624 12/26/20  0757 12/27/20  0529    135  --  139   K 3.9 4.6  --  4.1   CL 97* 99  --  101   CO2 28 24  --  24   GLUCOSE 287* 275*  --  205*   BUN 14 19  --  21   CREATININE 0.73 0.74  --  0.67   MG  --   --  2.2 2.4   ANIONGAP 13 12  --  14   LABGLOM >60 >60  --  >60   GFRAA >60 >60  --  >60   CALCIUM 8.9 8.6  --  8.4*   PHOS  --  Unable to perform testing: Specimen quantity not sufficient. 2.2* 2.3*   TROPHS 10  --   --   --      Recent Labs     12/25/20 0722 12/25/20 0722 12/25/20 2036 12/26/20  0624 12/26/20  0845 12/26/20  1121 12/26/20  1720 12/26/20 2142 12/27/20  0529 12/27/20  0907   PROT 7.3  --   --   --   --   --   --   --   --   --    LABALBU 3.3*  --   --  2.8*  --   --   --   --  3.0*  --    AST 21  --   --   --   --   --   --   --   --   --    ALT 8  --   --   --   --   --   --   --   --   --    *  --   --   --   --   --   --   --   --   --    ALKPHOS 106*  --   --   --   --   --   --   --   --   --    BILITOT 0.26*  --   --   --   --   --   --   --   --   --    BILIDIR 0.09  --   --   --   --   --   --   --   --   --    POCGLU  --    < > 400*  --  259* 222* 218* 251*  --  200*    < > = values in this interval not displayed.      ABG:No results found for: POCPH, PHART, PH, POCPCO2, DVG7PUD, PCO2, POCPO2, PO2ART, PO2, POCHCO3, HAV5AGJ, HCO3, NBEA, PBEA, BEART, BE, THGBART, THB, TTL0KHL, VMTT4VOK, I4QHDMNT, O2SAT, FIO2  Lab Results   Component Value Date/Time    SPECIAL NOT REPORTED 05/26/2019 08:34 AM     No results found for: CULTURE    Radiology:  Xr Chest Portable    Result Date: 12/25/2020  No cardiomegaly or interstitial edema. Subtle non consolidating pneumonia appears to be developing in both lower lobes. Physical Examination:        General appearance:  alert, cooperative and no distress  Mental Status:  oriented to person, place and time and normal affect  Lungs:  clear to auscultation bilaterally, normal effort, diminished throughout  Heart:  regular rate and rhythm, no murmur  Abdomen:  soft, nontender, nondistended, normal bowel sounds, no masses, hepatomegaly, splenomegaly  Extremities:  no edema, redness, tenderness in the calves  Skin:  no gross lesions, rashes, induration    Assessment:        Hospital Problems           Last Modified POA    * (Principal) Pneumonia due to COVID-19 virus 12/25/2020 Yes    Type 2 diabetes mellitus with hyperglycemia, without long-term current use of insulin ([facility] 12/25/2020 Yes    Essential hypertension 12/25/2020 Yes    Acute hypoxemic respiratory failure [facility] 12/25/2020 Yes    Elevated C-reactive protein (CRP) 12/25/2020 Yes          Plan:        1. Continue remdesivir as ordered  2. Decadron as ordered  3. Monitor blood sugars, adjust insulins as needed. Resume metformin and Glucotrol  4. Monitor and control blood pressure  5. GI and DVT prophylaxis  6. Wean oxygen as able  7. Monitor inflammatory markers  8.  Activity as tolerated, PT and OT as needed    Brice Mullins DO  12/27/2020  10:53 AM

## 2020-12-27 NOTE — PROGRESS NOTES
[facility] [address] [facility] Patient  Today's Date and Time: 12/27/2020, 5:12 PM    Impression :   · COVID 19 Suspect  · COVID 19 Confirmed Infection-12/25/2020  · Covid tests:Positive:  12/25/2020  · Elevated inflammatory markers   · Productive cough  · Acute Hypoxic respiratory failure: On 6 L nasal cannula    Recommendations:   · Monitor off antibiotics  · Clinical Research will approach patient to explore if she qualifies for any of the COVID 19 treatment protocols. · Remdesivir- therapy started 12/25/2020  · Convalescent Plasma-1 unit given 12/25/2020  · Decadron 6 mg daily x10 days started 12/25/2020    Medical Decision Making/Summary/Discussion:12/27/2020     · Patient admitted with suspected COVID 19 infection  · Covid test confirmed positive. 12/25/2020  Infection Control Recommendations   · Universal Precautions  · Airborne isolation  · Droplet Isolation    Antimicrobial Stewardship Recommendations     No antimicrobial therapy needed at this time  Coordination of Outpatient Care:   · Estimated Length of IV antimicrobials:TBD  · Patient will need Midline Catheter Insertion: TBD  · Patient will need PICC line Insertion: No  · Patient will need: Home IV , [place],  SNF,  LTAC:TBD  · Patient will need outpatient wound care:No    Chief complaint/reason for consultation:   · Concern for COVID infection      History of Present Illness:   Dm Torres is a 78y.o.-year-old -American female with a past medical history of diabetes mellitus type 2, hypertension, hyperlipidemia, and arthritis who was initially admitted on 12/25/2020. Patient seen at the request of Dr. Andrew Cook. INITIAL HISTORY:    Patient presented through ER with complaints of fatigue, myalgias and chills x4 days, with newer onset cough with yellow sputum production and pleuritic chest pain x36 hours. She denied fever, diarrhea, vomiting, or abdominal pain.     Patient received 1 unit of plasma, as well as remdesivir and Decadron therapy 12/25/2020. She was admitted to [facility] ICU placed on 6 L nasal cannula. Patient admitted because of concerns with COVID 19.    CURRENT EVALUATION : 12/27/2020    Patient showing some improvement  She reports that she is less short of breath  Her pain has subsided  Overall feels better  Afebrile  VS stable      Patient exhibiting respiratory distress. With exertion  Respiratory secretions: Thin yellow with cough    Patient receiving supplemental oxygen. 6 L nasal cannula  02 sat: 90 to 94%  RR: 20-24        NEWS Score: 0-4 Low risk group; 5-6: Medium risk group; 7 or above: High risk group  Parameters 3 2 1 0 1 2 3   Age    < 65   ? 65   RR ? 8  9-11 12-20  21-24 ? 25   O2 Sats ? 91 92-93 94-95 ? 96      Suppl O2  Yes  No      SBP ? 90  101-110 111-219   ? 220   HR ? 40  41-50 51-90  111-130 ? 131   Consciousness    Alert   Drowsiness, lethargy, or confusion   Temperature ? 35.0 C (95.0 F)  35.1-36.0 C 95.1-96.9 F 36.1-38.0 C 97.0-100.4 F 38.1-39.0 C 100.5-102.3 F ? 39.1 C ? 102.4 F      NEWS Score: 9  · High risk    Overall Daily Picture:    Unchanged    Presence of secondary bacterial Infection:  No   Additional antibiotics: No    Labs, X rays reviewed: 12/27/2020    BUN: 19  Cr: 0.74    WBC: 8.1  Hb: 13.2  Plat: 230    Absolute Neutrophils: 6.39  Absolute Lymphocytes: 1.33  Neutrophil/Lymphocyte Ratio: 4.8    CRP: 76 .7  Ferritin: 152  LDH: 389    Pro Calcitonin: 0.08      Cultures:  Urine:  ·   Blood:  ·   Sputum :  ·   Wound:       CXR:     12/25/2020  No cardiomegaly or interstitial edema.       Subtle non consolidating pneumonia appears to be developing in both lower   lobes             Discussed with patient, RN, CC, IM. I have personally reviewed the past medical history, past surgical history, medications, social history, and family history, and I have updated the database accordingly.   Past Medical History:     Past Medical History: Diagnosis Date    Arthritis     Diabetes mellitus ([facility]     Hyperlipidemia 11/9/2016    Hypertension     Hypokalemia with normal acid-base balance 9/13/2016    Pneumonia due to COVID-19 virus 12/25/2020    Type 2 diabetes mellitus with hyperosmolarity not at goal [facility]) 9/13/2016       Past Surgical  History:     Past Surgical History:   Procedure Laterality Date    EYE SURGERY      HYSTERECTOMY      HYSTERECTOMY, TOTAL ABDOMINAL      TUBAL LIGATION         Medications:      dexamethasone  6 mg Oral Daily    amLODIPine  10 mg Oral Daily    carvedilol  6.25 mg Oral BID    Vitamin D  1,000 Units Oral Daily    glipiZIDE  5 mg Oral BID AC    lisinopril  20 mg Oral Daily    metFORMIN  1,000 mg Oral Daily with breakfast    enoxaparin  40 mg Subcutaneous BID    insulin lispro  0-12 Units Subcutaneous TID WC    insulin lispro  0-6 Units Subcutaneous Nightly    insulin glargine  12 Units Subcutaneous Nightly    remdesivir IVPB  100 mg Intravenous Q24H       Social History:     Social History     Socioeconomic History    Marital status:      Spouse name: Not on file    Number of children: Not on file    Years of education: Not on file    Highest education level: Not on file   Occupational History    Not on file   Social Needs    Financial resource strain: Not on file    Food insecurity     Worry: Not on file     Inability: Not on file    Transportation needs     Medical: Not on file     Non-medical: Not on file   Tobacco Use    Smoking status: Never Smoker    Smokeless tobacco: Never Used   Substance and Sexual Activity    Alcohol use: No    Drug use: No    Sexual activity: Not on file   Lifestyle    Physical activity     Days per week: Not on file     Minutes per session: Not on file    Stress: Not on file   Relationships    Social connections     Talks on phone: Not on file     Gets together: Not on file     Attends [employer]ism service: Not on file     Active member of club or organization: Not on file     Attends meetings of clubs or organizations: Not on file     Relationship status: Not on file    Intimate partner violence     Fear of current or ex partner: Not on file     Emotionally abused: Not on file     Physically abused: Not on file     Forced sexual activity: Not on file   Other Topics Concern    Not on file   Social History Narrative    Not on file       Family History:   History reviewed. No pertinent family history. Allergies:   Atorvastatin and Codeine     Review of Systems:     Patient assessed 12/26/2020  Constitutional: No fevers or chills. No systemic complaints  Head: No headaches  Eyes: No double vision or blurry vision. No conjunctival inflammation. ENT: No sore throat or runny nose. . No hearing loss, tinnitus or vertigo. Cardiovascular: No chest pain or palpitations. No Shortness of breath. Positive BEARD  Lung: Positive cough with sputum production  Abdomen: No nausea, vomiting, diarrhea, or abdominal pain. Lesle Gulling No cramps. Genitourinary: No increased urinary frequency, or dysuria. No hematuria. No suprapubic or CVA pain  Musculoskeletal: No muscle aches or pains. No joint effusions, swelling or deformities  Hematologic: No bleeding or bruising. Neurologic: No headache, weakness, numbness, or tingling. Integument: No rash, no ulcers. Psychiatric: No depression. Endocrine: No polyuria, no polydipsia, no polyphagia. Physical Examination :     Patient Vitals for the past 8 hrs:   BP Temp Temp src Pulse Resp SpO2   12/27/20 1222 106/71 98.8 °F (37.1 °C) Oral 70 17 (!) 89 %   12/27/20 1219 -- -- -- 64 24 91 %   12/27/20 1049 105/70 98.1 °F (36.7 °C) Oral 65 18 91 %     General Appearance: Awake, alert, and in no apparent distress  Head:  Normocephalic, no trauma  Eyes: Pupils equal, round, reactive to light; sclera anicteric; conjunctivae pink. No embolic phenomena. ENT: Oropharynx clear, without erythema, exudate, or thrush. No tenderness of sinuses. Mouth/throat: mucosa pink and moist. No lesions. Dentition in good repair. Neck:Supple, without lymphadenopathy. Thyroid normal, No bruits. Pulmonary/Chest: Clear to auscultation, without wheezes, rales, or rhonchi. No dullness to percussion. Cardiovascular: Regular rate and rhythm without murmurs, rubs, or gallops. Abdomen: Soft, non tender. Bowel sounds normal. No organomegaly  All four Extremities: No cyanosis, clubbing, edema, or effusions. Neurologic: No gross sensory or motor deficits. Skin: Warm and dry with good turgor. No signs of peripheral arterial or venous insufficiency. No ulcerations. No open wounds. Medical Decision Making -Laboratory:   I have independently reviewed/ordered the following labs:    CBC with Differential:   Recent Labs     12/26/20  0624 12/27/20  0529   WBC 8.1 10.3   HGB 13.2 13.0   HCT 39.8 39.7    280   LYMPHOPCT 16* 9*   MONOPCT 4 3     BMP:   Recent Labs     12/26/20  0624 12/26/20  0757 12/27/20  0529     --  139   K 4.6  --  4.1   CL 99  --  101   CO2 24  --  24   BUN 19  --  21   CREATININE 0.74  --  0.67   MG  --  2.2 2.4     Hepatic Function Panel:   Recent Labs     12/25/20  0722 12/26/20  0624 12/27/20  0529   PROT 7.3  --   --    LABALBU 3.3* 2.8* 3.0*   BILIDIR 0.09  --   --    IBILI 0.17  --   --    BILITOT 0.26*  --   --    ALKPHOS 106*  --   --    ALT 8  --   --    AST 21  --   --      No results for input(s): RPR in the last 72 hours. No results for input(s): HIV in the last 72 hours. No results for input(s): BC in the last 72 hours.   Lab Results   Component Value Date    RBC 4.64 12/27/2020    WBC 10.3 12/27/2020     Lab Results   Component Value Date    CREATININE 0.67 12/27/2020    GLUCOSE 205 12/27/2020       Medical Decision Making-Imaging:   Xr Chest Portable     Result Date: 12/25/2020  EXAMINATION: ONE XRAY VIEW OF THE CHEST 12/25/2020 7:48 am COMPARISON: 05/26/2019 HISTORY: ORDERING SYSTEM PROVIDED HISTORY: productive cough, chest

## 2020-12-28 LAB
ABSOLUTE EOS #: 0 K/UL (ref 0–0.4)
ABSOLUTE IMMATURE GRANULOCYTE: 0 K/UL (ref 0–0.3)
ABSOLUTE LYMPH #: 1.77 K/UL (ref 1–4.8)
ABSOLUTE MONO #: 0.47 K/UL (ref 0.1–0.8)
ANION GAP SERPL CALCULATED.3IONS-SCNC: 10 MMOL/L (ref 9–17)
BASOPHILS # BLD: 0 % (ref 0–2)
BASOPHILS ABSOLUTE: 0 K/UL (ref 0–0.2)
BUN BLDV-MCNC: 20 MG/DL (ref 8–23)
BUN/CREAT BLD: ABNORMAL (ref 9–20)
C-REACTIVE PROTEIN: 11.1 MG/L (ref 0–5)
CALCIUM SERPL-MCNC: 8.3 MG/DL (ref 8.6–10.4)
CHLORIDE BLD-SCNC: 105 MMOL/L (ref 98–107)
CO2: 25 MMOL/L (ref 20–31)
CREAT SERPL-MCNC: 0.63 MG/DL (ref 0.5–0.9)
DIFFERENTIAL TYPE: ABNORMAL
EOSINOPHILS RELATIVE PERCENT: 0 % (ref 1–4)
GFR AFRICAN AMERICAN: >60 ML/MIN
GFR NON-AFRICAN AMERICAN: >60 ML/MIN
GFR SERPL CREATININE-BSD FRML MDRD: ABNORMAL ML/MIN/{1.73_M2}
GFR SERPL CREATININE-BSD FRML MDRD: ABNORMAL ML/MIN/{1.73_M2}
GLUCOSE BLD-MCNC: 175 MG/DL (ref 65–105)
GLUCOSE BLD-MCNC: 185 MG/DL (ref 70–99)
GLUCOSE BLD-MCNC: 198 MG/DL (ref 65–105)
GLUCOSE BLD-MCNC: 286 MG/DL (ref 65–105)
GLUCOSE BLD-MCNC: 309 MG/DL (ref 65–105)
HCT VFR BLD CALC: 39.8 % (ref 36.3–47.1)
HEMOGLOBIN: 12.4 G/DL (ref 11.9–15.1)
IMMATURE GRANULOCYTES: 0 %
LYMPHOCYTES # BLD: 19 % (ref 24–44)
MCH RBC QN AUTO: 28.1 PG (ref 25.2–33.5)
MCHC RBC AUTO-ENTMCNC: 31.2 G/DL (ref 28.4–34.8)
MCV RBC AUTO: 90 FL (ref 82.6–102.9)
MONOCYTES # BLD: 5 % (ref 1–7)
MORPHOLOGY: ABNORMAL
NRBC AUTOMATED: 0.3 PER 100 WBC
PDW BLD-RTO: 15.4 % (ref 11.8–14.4)
PLATELET # BLD: 335 K/UL (ref 138–453)
PLATELET ESTIMATE: ABNORMAL
PMV BLD AUTO: 10.7 FL (ref 8.1–13.5)
POTASSIUM SERPL-SCNC: 3.9 MMOL/L (ref 3.7–5.3)
RBC # BLD: 4.42 M/UL (ref 3.95–5.11)
RBC # BLD: ABNORMAL 10*6/UL
SEG NEUTROPHILS: 76 % (ref 36–66)
SEGMENTED NEUTROPHILS ABSOLUTE COUNT: 7.06 K/UL (ref 1.8–7.7)
SODIUM BLD-SCNC: 140 MMOL/L (ref 135–144)
WBC # BLD: 9.3 K/UL (ref 3.5–11.3)
WBC # BLD: ABNORMAL 10*3/UL

## 2020-12-28 PROCEDURE — 6360000002 HC RX W HCPCS: Performed by: INTERNAL MEDICINE

## 2020-12-28 PROCEDURE — 99232 SBSQ HOSP IP/OBS MODERATE 35: CPT | Performed by: INTERNAL MEDICINE

## 2020-12-28 PROCEDURE — 94761 N-INVAS EAR/PLS OXIMETRY MLT: CPT

## 2020-12-28 PROCEDURE — 97535 SELF CARE MNGMENT TRAINING: CPT

## 2020-12-28 PROCEDURE — 97530 THERAPEUTIC ACTIVITIES: CPT

## 2020-12-28 PROCEDURE — 82947 ASSAY GLUCOSE BLOOD QUANT: CPT

## 2020-12-28 PROCEDURE — 2060000000 HC ICU INTERMEDIATE R&B

## 2020-12-28 PROCEDURE — 80048 BASIC METABOLIC PNL TOTAL CA: CPT

## 2020-12-28 PROCEDURE — 99233 SBSQ HOSP IP/OBS HIGH 50: CPT | Performed by: INTERNAL MEDICINE

## 2020-12-28 PROCEDURE — 36415 COLL VENOUS BLD VENIPUNCTURE: CPT

## 2020-12-28 PROCEDURE — 85025 COMPLETE CBC W/AUTO DIFF WBC: CPT

## 2020-12-28 PROCEDURE — 97166 OT EVAL MOD COMPLEX 45 MIN: CPT

## 2020-12-28 PROCEDURE — 86140 C-REACTIVE PROTEIN: CPT

## 2020-12-28 PROCEDURE — 2700000000 HC OXYGEN THERAPY PER DAY

## 2020-12-28 PROCEDURE — 2580000003 HC RX 258: Performed by: INTERNAL MEDICINE

## 2020-12-28 PROCEDURE — 2500000003 HC RX 250 WO HCPCS: Performed by: INTERNAL MEDICINE

## 2020-12-28 PROCEDURE — 6370000000 HC RX 637 (ALT 250 FOR IP): Performed by: INTERNAL MEDICINE

## 2020-12-28 PROCEDURE — 97162 PT EVAL MOD COMPLEX 30 MIN: CPT

## 2020-12-28 RX ORDER — SODIUM CHLORIDE 9 MG/ML
INJECTION, SOLUTION INTRAVENOUS PRN
Status: DISCONTINUED | OUTPATIENT
Start: 2020-12-28 | End: 2020-12-29

## 2020-12-28 RX ORDER — FUROSEMIDE 10 MG/ML
20 INJECTION INTRAMUSCULAR; INTRAVENOUS ONCE
Status: COMPLETED | OUTPATIENT
Start: 2020-12-28 | End: 2020-12-28

## 2020-12-28 RX ADMIN — DEXAMETHASONE 6 MG: 4 TABLET ORAL at 08:52

## 2020-12-28 RX ADMIN — Medication 1000 UNITS: at 08:52

## 2020-12-28 RX ADMIN — GLIPIZIDE 5 MG: 5 TABLET ORAL at 08:52

## 2020-12-28 RX ADMIN — GLIPIZIDE 5 MG: 5 TABLET ORAL at 16:45

## 2020-12-28 RX ADMIN — INSULIN GLARGINE 12 UNITS: 100 INJECTION, SOLUTION SUBCUTANEOUS at 20:25

## 2020-12-28 RX ADMIN — AMLODIPINE BESYLATE 10 MG: 10 TABLET ORAL at 08:52

## 2020-12-28 RX ADMIN — INSULIN LISPRO 2 UNITS: 100 INJECTION, SOLUTION INTRAVENOUS; SUBCUTANEOUS at 13:02

## 2020-12-28 RX ADMIN — LISINOPRIL 20 MG: 20 TABLET ORAL at 08:52

## 2020-12-28 RX ADMIN — FUROSEMIDE 20 MG: 10 INJECTION, SOLUTION INTRAMUSCULAR; INTRAVENOUS at 18:24

## 2020-12-28 RX ADMIN — METFORMIN HYDROCHLORIDE 1000 MG: 500 TABLET ORAL at 08:52

## 2020-12-28 RX ADMIN — INSULIN LISPRO 4 UNITS: 100 INJECTION, SOLUTION INTRAVENOUS; SUBCUTANEOUS at 20:25

## 2020-12-28 RX ADMIN — CARVEDILOL 6.25 MG: 6.25 TABLET, FILM COATED ORAL at 08:52

## 2020-12-28 RX ADMIN — CARVEDILOL 6.25 MG: 6.25 TABLET, FILM COATED ORAL at 20:25

## 2020-12-28 RX ADMIN — INSULIN LISPRO 6 UNITS: 100 INJECTION, SOLUTION INTRAVENOUS; SUBCUTANEOUS at 17:00

## 2020-12-28 RX ADMIN — REMDESIVIR 100 MG: 100 INJECTION, POWDER, LYOPHILIZED, FOR SOLUTION INTRAVENOUS at 16:56

## 2020-12-28 RX ADMIN — ENOXAPARIN SODIUM 40 MG: 40 INJECTION SUBCUTANEOUS at 08:54

## 2020-12-28 RX ADMIN — INSULIN LISPRO 2 UNITS: 100 INJECTION, SOLUTION INTRAVENOUS; SUBCUTANEOUS at 09:00

## 2020-12-28 RX ADMIN — ENOXAPARIN SODIUM 40 MG: 40 INJECTION SUBCUTANEOUS at 20:25

## 2020-12-28 NOTE — PROGRESS NOTES
Occupational Therapy   Occupational Therapy Initial Assessment  Date: 2020   Patient Name: Trevor Smith  MRN: [id number]     : 1940    Date of Service: 2020    Discharge Recommendations:    No therapy recommended at discharge. Assessment   Performance deficits / Impairments: Decreased endurance;Decreased ADL status; Decreased balance;Decreased high-level IADLs  Assessment: Pt agreeable to OT eval. Pt completed functional transfers and mobility with SBA/CGA for safety only. Pt requires SBA-CGA throughout majority ADLs at this time. Pt on 15L NRB O2 at this time however states is typicall not on O2. Pt SpO2 dropped to 86% with activity however returned to 93% once seated. Pt will benefit from continued OT services throughout hospitalization to increase safety, independence, and endurance for ADL and functional mobility performance  Prognosis: Good  Decision Making: Medium Complexity  Patient Education: Pt educated on OT role, OT POC, safety awareness, pursed lip breathing, and importance of continued OT. Pt verbalized good understanding  REQUIRES OT FOLLOW UP: Yes  Activity Tolerance  Activity Tolerance: Patient Tolerated treatment well  Safety Devices  Safety Devices in place: Yes  Type of devices: Nurse notified; Left in bed;Gait belt;Call light within reach  Restraints  Initially in place: No           Patient Diagnosis(es): The encounter diagnosis was COVID-19.     has a past medical history of Arthritis, Diabetes mellitus ([facility] [address] Hyperlipidemia, Hypertension, Hypokalemia with normal acid-base balance, Pneumonia due to COVID-19 virus, and Type 2 diabetes mellitus with hyperosmolarity not at goal [facility]). has a past surgical history that includes Hysterectomy; Hysterectomy, total abdominal; Tubal ligation; and eye surgery.            Restrictions  Restrictions/Precautions  Restrictions/Precautions: Isolation, Fall Risk(COVID+)  Required Braces or Orthoses?: No  Position Activity Restriction  Other position/activity restrictions: up with assist    Subjective   General  Patient assessed for rehabilitation services?: Yes  Family / Caregiver Present: No  Patient Currently in Pain: Denies  Vital Signs  Patient Currently in Pain: Denies     Social/Functional History  Social/Functional History  Lives With: Alone  Type of Home: House  Home Layout: One level, Laundry in basement  Home Access: Stairs to enter with rails  Entrance Stairs - Number of Steps: 4  Entrance Stairs - Rails: Both  Bathroom Shower/Tub: Tub/Shower unit  Bathroom Toilet: Standard  Bathroom Equipment: Grab bars in shower, Grab bars around toilet  Home Equipment: (no DME use at baseline)  Receives Help From: Friend(s), Family(pt reports has supportive children and friends that can assist pt as needed)  ADL Assistance: Independent  Homemaking Assistance: Independent  Homemaking Responsibilities: Yes  Ambulation Assistance: Independent  Transfer Assistance: Independent  Active : Yes  Mode of Transportation: Car  Occupation: Retired  Type of occupation: worked for [employer] [address]: pt enjoys [employer] and [employer]       Objective   Vision: Impaired  Vision Exceptions: Wears glasses at all times(pt reports wearing glasses 50% of the time)  Hearing: Exceptions to [facility]  Hearing Exceptions: Bilateral hearing aid(wears PRN)      Balance  Sitting Balance: Supervision  Standing Balance: Stand by assistance  Functional Mobility  Functional - Mobility Device: No device  Activity: Other  Assist Level: Contact guard assistance  Functional Mobility Comments: Pt completed functional mobility within hospital room with CGA; 1 LOB self corrected     ADL  Feeding: Independent;Setup  Grooming: Independent;Setup  UE Bathing: Stand by assistance;Setup  LE Bathing: Stand by assistance;Setup  UE Dressing: Stand by assistance;Setup  LE Dressing: Stand by assistance;Setup  Toileting: Setup;Contact guard assistance  Tone RUE  RUE Tone: Normotonic  Tone LUE  LUE Tone: Normotonic  Coordination  Movements Are Fluid And Coordinated: Yes    Bed mobility  Supine to Sit: Stand by assistance  Sit to Supine: Stand by assistance  Scooting: Stand by assistance  Comment: HOB elevated ~30 degrees  Transfers  Sit to stand: Stand by assistance  Stand to sit: Stand by assistance    Cognition  Overall Cognitive Status: WFL    Sensation  Overall Sensation Status: WFL(pt denies numbness/tingling at this time)        LUE AROM (degrees)  LUE AROM : WFL  Left Hand AROM (degrees)  Left Hand AROM: WFL  RUE AROM (degrees)  RUE AROM : WFL  Right Hand AROM (degrees)  Right Hand AROM: WFL  LUE Strength  Gross LUE Strength: WFL  L Hand General: 4+/5  LUE Strength Comment: grossly 4+/5  RUE Strength  Gross RUE Strength: WFL  R Hand General: 4+/5  RUE Strength Comment: grossly 4+/5                   Plan   Plan  Times per week: 3-5 x/wk  Current Treatment Recommendations: Balance Training, Functional Mobility Training, Endurance Training, Self-Care / ADL, Safety Education & Training    AM-PAC Score        -[facility] Inpatient Daily Activity Raw Score: 20 (12/28/20 1532)  AM-PAC Inpatient ADL T-Scale Score : 42.03 (12/28/20 1532)  ADL Inpatient CMS 0-100% Score: 38.32 (12/28/20 1532)  ADL Inpatient CMS G-Code Modifier : Amy Lisa (12/28/20 1532)    Goals  Short term goals  Time Frame for Short term goals: By discharge, pt will:  Short term goal 1: Demo I with setup for UB and LB ADLs and toileting tasks  Short term goal 2: Demo I for dynamic functional mobility task to increase safety and independence with ADLs/IADLs  Short term goal 3: Demo 10+ minutes dynamic standing task to increase safety and independence with ADLs/IADLs  Short term goal 4: Demo I with EC/WS techniques to incorporate throughout ADLs/IADLs  Short term goal 5: Demo I with safety awareness and pursed lip breathing throughout functional mobility and ADL tasks       Therapy Time   Individual Concurrent Group Co-treatment Time In 1006         Time Out 1032         Minutes 26         Timed Code Treatment Minutes: 12 Minutes       Dana Perrin OTR/L

## 2020-12-28 NOTE — PLAN OF CARE
Problem: Airway Clearance - Ineffective  Goal: Achieve or maintain patent airway  12/28/2020 0533 by Hilary Carpenter RN  Outcome: Ongoing  12/27/2020 1813 by Edwin Stroud RN  Outcome: Ongoing     Problem: Gas Exchange - Impaired  Goal: Absence of hypoxia  12/28/2020 0533 by Hilary Carpenter RN  Outcome: Ongoing  12/27/2020 1813 by Edwin Stroud RN  Outcome: Ongoing  Goal: Promote optimal lung function  12/28/2020 0533 by Hilary Carpenter RN  Outcome: Ongoing  12/27/2020 1813 by Edwin Stroud RN  Outcome: Ongoing     Problem: Breathing Pattern - Ineffective  Goal: Ability to achieve and maintain a regular respiratory rate  12/28/2020 0533 by Hilary Carpenter RN  Outcome: Ongoing  12/27/2020 1813 by Edwin Stroud RN  Outcome: Ongoing     Problem:  Body Temperature -  Risk of, Imbalanced  Goal: Ability to maintain a body temperature within defined limits  12/28/2020 0533 by Hilary Carpenter RN  Outcome: Ongoing  12/27/2020 1813 by Edwin Stroud RN  Outcome: Ongoing  Goal: Will regain or maintain usual level of consciousness  12/28/2020 0533 by Hilary Carpenter RN  Outcome: Ongoing  12/27/2020 1813 by Edwin Stroud RN  Outcome: Ongoing  Goal: Complications related to the disease process, condition or treatment will be avoided or minimized  12/28/2020 0533 by Hilary Carpenter RN  Outcome: Ongoing  12/27/2020 1813 by Edwin Stroud RN  Outcome: Ongoing     Problem: Isolation Precautions - Risk of Spread of Infection  Goal: Prevent transmission of infection  12/28/2020 0533 by Hilary Carpenter RN  Outcome: Ongoing  12/27/2020 1813 by Edwin Stroud RN  Outcome: Ongoing     Problem: Nutrition Deficits  Goal: Optimize nutrtional status  12/28/2020 0533 by Hilary Carpenter RN  Outcome: Ongoing  12/27/2020 1813 by Edwin Stroud RN  Outcome: Ongoing     Problem: Risk for Fluid Volume Deficit  Goal: Maintain normal heart rhythm  12/28/2020 0533 by Hilary Carpenter RN  Outcome: Ongoing  12/27/2020 1813 by Edwin Stroud RN  Outcome: Ongoing  Goal: Maintain absence of muscle cramping  12/28/2020 0533 by Ro Espinoza RN  Outcome: Ongoing  12/27/2020 1813 by Maximo Martines RN  Outcome: Ongoing  Goal: Maintain normal serum potassium, sodium, calcium, phosphorus, and pH  12/28/2020 0533 by Ro Espinoza RN  Outcome: Ongoing  12/27/2020 1813 by Maximo Martines RN  Outcome: Ongoing     Problem: Loneliness or Risk for Loneliness  Goal: Demonstrate positive use of time alone when socialization is not possible  12/28/2020 0533 by Ro Espinoza RN  Outcome: Ongoing  12/27/2020 1813 by Maximo Martines RN  Outcome: Ongoing     Problem: Fatigue  Goal: Verbalize increase energy and improved vitality  12/28/2020 0533 by Ro Espinoza RN  Outcome: Ongoing  12/27/2020 1813 by Maximo Martines RN  Outcome: Ongoing     Problem: Patient Education: Go to Patient Education Activity  Goal: Patient/Family Education  12/28/2020 0533 by Ro Espinoza RN  Outcome: Ongoing  12/27/2020 1813 by Maximo Martines RN  Outcome: Ongoing     Problem: Pain:  Goal: Pain level will decrease  Description: Pain level will decrease  12/28/2020 0533 by Ro Espinoza RN  Outcome: Ongoing  12/27/2020 1813 by Maximo Martines RN  Outcome: Ongoing  Goal: Control of acute pain  Description: Control of acute pain  12/28/2020 0533 by Ro Espinoza RN  Outcome: Ongoing  12/27/2020 1813 by Maximo Martines RN  Outcome: Ongoing  Goal: Control of chronic pain  Description: Control of chronic pain  12/28/2020 0533 by Ro Espinoza RN  Outcome: Ongoing  12/27/2020 1813 by Maximo Martines RN  Outcome: Ongoing     Problem: Falls - Risk of:  Goal: Will remain free from falls  Description: Will remain free from falls  12/28/2020 0533 by Ro Espinoza RN  Outcome: Ongoing  12/27/2020 1813 by Maximo Martines RN  Outcome: Ongoing  Goal: Absence of physical injury  Description: Absence of physical injury  12/28/2020 0533 by Ro Espinoza RN  Outcome: Ongoing  12/27/2020 1813 by Maximo Martines RN  Outcome: Ongoing

## 2020-12-28 NOTE — CARE COORDINATION
TRANSITIONAL CARE PLANNING/ 2 Rehab Jorge Day: 3    Reason for Admission: Pneumonia due to COVID-19 virus [U07.1, J12.89]  Pneumonia due to COVID-19 virus [U07.1, J12.89]     Treatment Plan of Care: Full code, 10L mask      Readmission Risk              Risk of Unplanned Readmission:        11            Patient goals/Treatment Preferences/Transitional Plan: home with home care vs snf

## 2020-12-28 NOTE — PROGRESS NOTES
Physical Therapy    Facility/Department: [facility] CAR 1  Initial Assessment    NAME: Harjit De Santiago  : 1940  MRN: [id number]  Chief Complaint   Patient presents with    Chest Pain    Cough     Date of Service: 2020    Discharge Recommendations: No further therapy required at discharge. PT Equipment Recommendations  Equipment Needed: No    Assessment   Body structures, Functions, Activity limitations: Decreased functional mobility ; Decreased balance;Decreased endurance  Assessment: The pt performed bed mobility with SBA, ambulated 30ft with CGA, limited by respiratory status this date. Recommend continued acute PT to maximize safety and progress toward prior level of independence. Prognosis: Good  Decision Making: Medium Complexity  PT Education: Goals;PT Role;Plan of Care; Functional Mobility Training  REQUIRES PT FOLLOW UP: Yes  Activity Tolerance  Activity Tolerance: Patient limited by endurance       Patient Diagnosis(es): The encounter diagnosis was COVID-19.     has a past medical history of Arthritis, Diabetes mellitus ([facility] [address] Hyperlipidemia, Hypertension, Hypokalemia with normal acid-base balance, Pneumonia due to COVID-19 virus, and Type 2 diabetes mellitus with hyperosmolarity not at goal [facility]). has a past surgical history that includes Hysterectomy; Hysterectomy, total abdominal; Tubal ligation; and eye surgery.     Restrictions  Restrictions/Precautions  Restrictions/Precautions: Isolation, Fall Risk(COVID+)  Required Braces or Orthoses?: No  Position Activity Restriction  Other position/activity restrictions: up with assist  Vision/Hearing  Vision: Impaired  Vision Exceptions: Wears glasses at all times(pt reports wearing glasses 50% of the time)  Hearing: Exceptions to [facility]  Hearing Exceptions: Bilateral hearing aid(wears PRN)     Subjective  General  Patient assessed for rehabilitation services?: Yes  Response To Previous Treatment: Not applicable  Family / Caregiver Present: No  Follows Commands: Within Functional Limits  Subjective  Subjective: RN and pt agreeable to PT. Pt supine in bed upon arrival on 15L via NRB, pleasant and cooperative throughout.   Pain Screening  Patient Currently in Pain: Denies  Vital Signs  Patient Currently in Pain: Denies       Orientation  Orientation  Overall Orientation Status: Within Functional Limits  Social/Functional History  Social/Functional History  Lives With: Alone  Type of Home: House  Home Layout: One level, Laundry in basement  Home Access: Stairs to enter with rails  Entrance Stairs - Number of Steps: 4  Entrance Stairs - Rails: Both  Bathroom Shower/Tub: Tub/Shower unit  Bathroom Toilet: Standard  Bathroom Equipment: Grab bars in shower, Grab bars around toilet  Home Equipment: (no DME use at baseline)  Receives Help From: Friend(s), Family(pt reports has supportive children and friends that can assist pt as needed)  ADL Assistance: Independent  Homemaking Assistance: Independent  Homemaking Responsibilities: Yes  Ambulation Assistance: Independent  Transfer Assistance: Independent  Active : Yes  Mode of Transportation: Car  Occupation: Retired  Type of occupation: worked for [employer] [address] [place] [address]: pt enjoys sewing and crafts  Cognition   Cognition  Overall Cognitive Status: WFL    Objective          Joint Mobility  Spine: WFL  ROM RLE: WFL  ROM LLE: WFL  ROM RUE: WFL  ROM LUE: WFL  Strength RLE  Strength RLE: WFL  Strength LLE  Strength LLE: WFL  Strength RUE  Strength RUE: WFL  Strength LUE  Strength LUE: WFL  Tone RLE  RLE Tone: Normotonic  Tone LLE  LLE Tone: Normotonic  Motor Control  Gross Motor?: WFL  Sensation  Overall Sensation Status: WFL(pt denies numbness/tingling at this time)  Bed mobility  Supine to Sit: Stand by assistance  Sit to Supine: Stand by assistance  Scooting: Stand by assistance  Comment: HOB elevated ~30 degrees  Transfers  Sit to Stand: Contact guard assistance  Stand to sit: Contact guard assistance  Stand Pivot Transfers: Contact guard assistance  Ambulation  Ambulation?: Yes  Ambulation 1  Surface: level tile  Device: No Device  Assistance: Contact guard assistance  Quality of Gait: one LOB, pt able to self-correct with stepping strategy  Gait Deviations: Slow Taylor;Deviated path;Decreased step height;Decreased arm swing  Distance: 30ft  Comments: SPO2 decreased to 86% on 15L NRB following ambulation  Stairs/Curb  Stairs?: No     Balance  Posture: Good  Sitting - Static: Good  Sitting - Dynamic: Good;-  Standing - Static: Fair;+  Standing - Dynamic: Fair  Comments: standing balance assessed without AD        Plan   Plan  Times per week: 3-5x/wk  Current Treatment Recommendations: Strengthening, ROM, Balance Training, Functional Mobility Training, Transfer Training, Gait Training, Stair training, Endurance Training, Home Exercise Program, Safety Education & Training, Patient/Caregiver Education & Training  Safety Devices  Type of devices: Nurse notified, Call light within reach, Gait belt, Left in bed  Restraints  Initially in place: No      AM-PAC Score  AM-PAC Inpatient Mobility Raw Score : 20 (12/28/20 [facility]5)  AM-PAC Inpatient T-Scale Score : 47.67 (12/28/20 [facility]5)  Mobility Inpatient CMS 0-100% Score: 35.83 (12/28/20 [facility])  Mobility Inpatient CMS G-Code Modifier : [place] (12/28/20 1435)          Goals  Short term goals  Time Frame for Short term goals: 14 visits  Short term goal 1: Perform bed mobility and functional transfers independently  Short term goal 2: Ambulate 300ft independently with SPO2 >90%  Short term goal 3: Ascend/descend 9 steps with HR and SBA  Short term goal 4: Demo Good dynamic standing balance to decrease risk of falls       Therapy Time   Individual Concurrent Group Co-treatment   Time In 1006         Time Out 1030         Minutes 24         Timed Code Treatment Minutes: 8 Minutes       Yuli Perez, PT

## 2020-12-28 NOTE — PROGRESS NOTES
[facility]  Office: [address], DO, Frances Garcia, DO, Jaycob Ramos, DO, Arnulfo Heller, DO, Lindsay Cam MD, Mirian Torres MD, Milton Galindo MD, Manjula Gotti MD, Deon Pace MD, Chinedu Cook MD, Sharma Babinski, MD, Joseph Mccracken MD, Christian Sanders MD, Gonzalo Crowell DO, Hunter Dillard MD, Brittney Orozco MD, Ruthie Salgado, DO, Linda Chavis MD,  Ghislaine Mckinley DO, Harinder Hubbard MD, Young Butt MD, Isamar Manley [facility], [facility], [facility], Mo Ziegler, CNP, Judit Dior, CNS, Marie Metcalf, CNP, Rachana Senior, CNP, Dorothy Almeida, CNP, Nga Daigle, CNP, Gino Prince, CNP, Delia Buchanan PA-C, Fanny Johns, [facility], Mona Romero, CNP, Dakota Pisano, CNP, Daniel Teran, CNP, Jovanni Phan, CNP, Yenifer Crawford, [facility]   [address] [facility]    Progress Note    12/28/2020    12:42 PM    Name:   Libra Malone  MRN:     [id number]     Acct:      [de-identified]   Room:   [facility]1006-01   Day:  3  Admit Date:  12/25/2020  6:49 AM    PCP:   Linus Ibarra MD  Code Status:  Full Code    Subjective:     C/C:   Chief Complaint   Patient presents with    Chest Pain    Cough     Interval History Status: worsened. Patient reports that she feels better but has had increasing oxygen requirements overnight. Had to be placed on nonrebreather overnight and currently on 10 L Ventimask, previously had been on 5 to 6 L nasal cannula. She denies any cough and reports less chest congestion. No chest pain, fevers or chills, nausea or vomiting or other complaints. Brief History:      This is an 18-year-old Afro-American female presents with a complaint of chest pressure, cough and increasing fatigue.  Her symptoms started this past Monday and have progressively worsened throughout the week.  She presented here and was found to have evidence of COVID-19 pneumonia is been admitted and started on Decadron, remdesivir and pertinent family history. Vitals:  BP (!) 161/75   Pulse 65   Temp 98.5 °F (36.9 °C) (Oral)   Resp 20   Ht 5' 4\" (1.626 m)   Wt 185 lb 14.4 oz (84.3 kg)   SpO2 92%   BMI 31.91 kg/m²   Temp (24hrs), Av.2 °F (36.8 °C), Min:97.9 °F (36.6 °C), Max:98.6 °F (37 °C)    Recent Labs     20  0907 20  1220 20  1750 20   POCGLU 200* 232* 231* 247*       I/O (24Hr): Intake/Output Summary (Last 24 hours) at 2020 1242  Last data filed at 2020 0535  Gross per 24 hour   Intake 950 ml   Output 665 ml   Net 285 ml       Labs:  Hematology:  Recent Labs     20  0620  0520  06   WBC 8.1 10.3 9.3   RBC 4.70 4.64 4.42   HGB 13.2 13.0 12.4   HCT 39.8 39.7 39.8   MCV 84.7 85.6 90.0   MCH 28.1 28.0 28.1   MCHC 33.2 32.7 31.2   RDW 14.8* 15.0* 15.4*    280 335   MPV 11.1 11.4 10.7   CRP  --   --  11.1*   INR 1.0 1.1  --      Chemistry:  Recent Labs     20  0624 20  0757 20  0520  06     --  139 140   K 4.6  --  4.1 3.9   CL 99  --  101 105   CO2 24  --  24 25   GLUCOSE 275*  --  205* 185*   BUN   --  21 20   CREATININE 0.74  --  0.67 0.63   MG  --  2.2 2.4  --    ANIONGAP 12  --  14 10   LABGLOM >60  --  >60 >60   GFRAA >60  --  >60 >60   CALCIUM 8.6  --  8.4* 8.3*   PHOS Unable to perform testing: Specimen quantity not sufficient. 2.2* 2.3*  --      Recent Labs     20  0624 20  0624 20  1720 20  2142 20  0529 20  0907 20  1220 20  1750 20  2053   LABALBU 2.8*  --   --   --  3.0*  --   --   --   --    POCGLU  --    < > 218* 251*  --  200* 232* 231* 247*    < > = values in this interval not displayed.      ABG:No results found for: POCPH, PHART, PH, POCPCO2, DAI0YOP, PCO2, POCPO2, PO2ART, PO2, POCHCO3, GYK2MCP, HCO3, NBEA, PBEA, BEART, BE, THGBART, THB, UEO0SNB, KSGD0FDF, V5OPSYLT, O2SAT, FIO2  Lab Results   Component Value Date/Time    SPECIAL NOT REPORTED 05/26/2019 08:34 AM     No results found for: CULTURE    Radiology:  Xr Chest Portable    Result Date: 12/25/2020  No cardiomegaly or interstitial edema. Subtle non consolidating pneumonia appears to be developing in both lower lobes. Physical Examination:        General appearance:  alert, cooperative and no distress  Mental Status:  oriented to person, place and time and normal affect  Lungs:  clear to auscultation bilaterally, normal effort, diminished throughout  Heart:  regular rate and rhythm, no murmur  Abdomen:  soft, nontender, nondistended, normal bowel sounds, no masses, hepatomegaly, splenomegaly  Extremities:  no edema, redness, tenderness in the calves  Skin:  no gross lesions, rashes, induration    Assessment:        Hospital Problems           Last Modified POA    * (Principal) Pneumonia due to COVID-19 virus 12/25/2020 Yes    Type 2 diabetes mellitus with hyperglycemia, without long-term current use of insulin ([facility] [address] 12/25/2020 Yes    Essential hypertension 12/25/2020 Yes    Acute hypoxemic respiratory failure ([facility] [address] 12/25/2020 Yes    Elevated C-reactive protein (CRP) 12/25/2020 Yes          Plan:        1. Continue remdesivir as ordered  2. Decadron to continue for 10 total doses  3. With worsening hypoxia repeat convalescent plasma  4. Continue insulin scale for glycemic control  5. No change in current antihypertensives  6. PT and OT as able  7. Encouraged ambulation and change of position within the room  8. Trend labs  9. IV Lasix x1 today, patient has mild positive fluid balance since admission  10.  Brief discussion regarding advanced directives, patient wishes to remain full code    Osmani Jain DO  12/28/2020  12:42 PM

## 2020-12-28 NOTE — PLAN OF CARE
Problem: Airway Clearance - Ineffective  Goal: Achieve or maintain patent airway  12/28/2020 0758 by Addie Melendrez RN  Outcome: Ongoing  12/28/2020 0533 by Sotero Dietrich RN  Outcome: Ongoing  12/27/2020 1813 by Deepak Monaco RN  Outcome: Ongoing     Problem: Gas Exchange - Impaired  Goal: Absence of hypoxia  12/28/2020 0758 by Addie Melendrez RN  Outcome: Ongoing  12/28/2020 0533 by Sotero Dietrich RN  Outcome: Ongoing  12/27/2020 1813 by Deepak Monaco RN  Outcome: Ongoing  Goal: Promote optimal lung function  12/28/2020 0758 by Addie Melendrez RN  Outcome: Ongoing  12/28/2020 0533 by Soetro Dietrich RN  Outcome: Ongoing  12/27/2020 1813 by Deepak Monaco RN  Outcome: Ongoing     Problem: Breathing Pattern - Ineffective  Goal: Ability to achieve and maintain a regular respiratory rate  12/28/2020 0758 by Addie Melendrez RN  Outcome: Ongoing  12/28/2020 0533 by Sotero Dietrich RN  Outcome: Ongoing  12/27/2020 1813 by Deepak Monaco RN  Outcome: Ongoing     Problem:  Body Temperature -  Risk of, Imbalanced  Goal: Ability to maintain a body temperature within defined limits  12/28/2020 0758 by Addie Melendrez RN  Outcome: Ongoing  12/28/2020 0533 by Sotero Dietrich RN  Outcome: Ongoing  12/27/2020 1813 by Deepak Monaco RN  Outcome: Ongoing  Goal: Will regain or maintain usual level of consciousness  12/28/2020 0758 by Addie Melendrez RN  Outcome: Ongoing  12/28/2020 0533 by Sotero Dietrich RN  Outcome: Ongoing  12/27/2020 1813 by Deepak Monaco RN  Outcome: Ongoing  Goal: Complications related to the disease process, condition or treatment will be avoided or minimized  12/28/2020 0758 by Addie Melendrez RN  Outcome: Ongoing  12/28/2020 0533 by Sotero Dietrich RN  Outcome: Ongoing  12/27/2020 1813 by Deepak Monaco RN  Outcome: Ongoing     Problem: Isolation Precautions - Risk of Spread of Infection  Goal: Prevent transmission of infection  12/28/2020 0758 by Addie Melendrez RN  Outcome: Ongoing  12/28/2020 0533 by Tristan Moran RN  Outcome: Ongoing  12/27/2020 1813 by Kimberly Martínez RN  Outcome: Ongoing     Problem: Nutrition Deficits  Goal: Optimize nutrtional status  12/28/2020 0758 by Joel Dubon RN  Outcome: Ongoing  12/28/2020 0533 by Tristan Morna RN  Outcome: Ongoing  12/27/2020 1813 by Kimberly Martínez RN  Outcome: Ongoing     Problem: Risk for Fluid Volume Deficit  Goal: Maintain normal heart rhythm  12/28/2020 0758 by Joel Dubon RN  Outcome: Ongoing  12/28/2020 0533 by Tristan Moran RN  Outcome: Ongoing  12/27/2020 1813 by Kimberly Martínez RN  Outcome: Ongoing  Goal: Maintain absence of muscle cramping  12/28/2020 0758 by Joel Dubon RN  Outcome: Ongoing  12/28/2020 0533 by Tristan Moran RN  Outcome: Ongoing  12/27/2020 1813 by Kimberly Martínez RN  Outcome: Ongoing  Goal: Maintain normal serum potassium, sodium, calcium, phosphorus, and pH  12/28/2020 0758 by Joel Dubon RN  Outcome: Ongoing  12/28/2020 0533 by Tristan Moran RN  Outcome: Ongoing  12/27/2020 1813 by Kimberly Martínez RN  Outcome: Ongoing     Problem: Loneliness or Risk for Loneliness  Goal: Demonstrate positive use of time alone when socialization is not possible  12/28/2020 0758 by Joel Dubon RN  Outcome: Ongoing  12/28/2020 0533 by Tristan Moran RN  Outcome: Ongoing  12/27/2020 1813 by Kimberly Martínez RN  Outcome: Ongoing     Problem: Fatigue  Goal: Verbalize increase energy and improved vitality  12/28/2020 0758 by Joel Dubon RN  Outcome: Ongoing  12/28/2020 0533 by Tristan Moran RN  Outcome: Ongoing  12/27/2020 1813 by Kimberly Martínez RN  Outcome: Ongoing     Problem: Patient Education: Go to Patient Education Activity  Goal: Patient/Family Education  12/28/2020 0758 by Joel Dubon RN  Outcome: Ongoing  12/28/2020 0533 by Tristan Moran RN  Outcome: Ongoing  12/27/2020 1813 by Kimberly Martínez RN  Outcome: Ongoing     Problem: Pain:  Goal: Pain level will decrease  Description: Pain level will decrease  12/28/2020 0758 by Kaitlyn Lorenzana RN  Outcome: Ongoing  12/28/2020 0533 by Vladimir Rivas RN  Outcome: Ongoing  12/27/2020 1813 by Arnulfo Richey RN  Outcome: Ongoing  Goal: Control of acute pain  Description: Control of acute pain  12/28/2020 0758 by Kaitlyn Lorenzana RN  Outcome: Ongoing  12/28/2020 0533 by Vladimir Rivas RN  Outcome: Ongoing  12/27/2020 1813 by Arnulfo Richey RN  Outcome: Ongoing  Goal: Control of chronic pain  Description: Control of chronic pain  12/28/2020 0758 by Kaitlyn Lorenzana RN  Outcome: Ongoing  12/28/2020 0533 by Vladimir Rivas RN  Outcome: Ongoing  12/27/2020 1813 by Arnulfo Richey RN  Outcome: Ongoing     Problem: Falls - Risk of:  Goal: Will remain free from falls  Description: Will remain free from falls  12/28/2020 0533 by Vladimir Rivas RN  Outcome: Ongoing  12/27/2020 1813 by Arnulfo Richey RN  Outcome: Ongoing  Goal: Absence of physical injury  Description: Absence of physical injury  12/28/2020 0533 by Vladimir Rivas RN  Outcome: Ongoing  12/27/2020 1813 by Arnulfo Richey RN  Outcome: Ongoing

## 2020-12-28 NOTE — PROGRESS NOTES
[facility] - Progress Note   COVID 19 Patient  Today's Date and Time: 12/28/2020, 12:14 PM    Impression :   · COVID 19 Suspect  · COVID 19 Confirmed Infection-12/25/2020  · Covid tests:Positive:  12/25/2020  · Elevated inflammatory markers   · Productive cough  · Acute Hypoxic respiratory failure: On 6 L nasal cannula    Recommendations:   · Antibiotic Rx:  · Monitor off antibiotics  · Covid Rx:  · Clinical Research will approach patient to explore if she qualifies for any of the COVID 19 treatment protocols. · Remdesivir- therapy started 12/25/2020. Stop date 12-29-20  · Convalescent Plasma-1 unit given 12/25/2020  · Decadron 6 mg daily x10 days started 12/25/2020    Medical Decision Making/Summary/Discussion:12/28/2020     · Patient admitted with suspected COVID 19 infection  · Covid test confirmed positive. 12/25/2020  · On Rx with remdesivir, plasma and decadron  Infection Control Recommendations   · Universal Precautions  · Airborne isolation  · Droplet Isolation    Antimicrobial Stewardship Recommendations     No antimicrobial therapy needed at this time  Coordination of Outpatient Care:   · Estimated Length of IV antimicrobials:TBD  · Patient will need Midline Catheter Insertion: TBD  · Patient will need PICC line Insertion: No  · Patient will need: Home IV , [place],  SNF,  LTAC:TBD  · Patient will need outpatient wound care:No    Chief complaint/reason for consultation:   · Concern for COVID infection      History of Present Illness:   Indira Virgen is a 78y.o.-year-old -American female with a past medical history of diabetes mellitus type 2, hypertension, hyperlipidemia, and arthritis who was initially admitted on 12/25/2020. Patient seen at the request of Dr. Edgard Montiel.     INITIAL HISTORY:    Patient presented through ER with complaints of fatigue, myalgias and chills x4 days, with newer onset cough with yellow sputum production and pleuritic chest pain x36 hours. She denied fever, diarrhea, vomiting, or abdominal pain. Patient received 1 unit of plasma, as well as remdesivir and Decadron therapy 12/25/2020. She was admitted to [facility] ICU placed on 6 L nasal cannula. Patient admitted because of concerns with COVID 19.    CURRENT EVALUATION : 12/28/2020    Afebrile  VS stable    Patient still having dyspnea and hypoxia  Was changed to NRB mask and now on simple mask    Her pain has subsided  Overall feels better except for hypoxia      Patient exhibiting respiratory distress. Yes  Respiratory secretions: Thin yellow with cough    Patient receiving supplemental oxygen. 6 L nasal cannula-->Facial mask 10 L/min  02 sat: 90 to 94-->92 %  RR: 20-24-->20        NEWS Score: 0-4 Low risk group; 5-6: Medium risk group; 7 or above: High risk group  Parameters 3 2 1 0 1 2 3   Age    < 65   ? 65   RR ? 8  9-11 12-20  21-24 ? 25   O2 Sats ? 91 92-93 94-95 ? 96      Suppl O2  Yes  No      SBP ? 90  101-110 111-219   ? 220   HR ? 40  41-50 51-90  111-130 ? 131   Consciousness    Alert   Drowsiness, lethargy, or confusion   Temperature ? 35.0 C (95.0 F)  35.1-36.0 C 95.1-96.9 F 36.1-38.0 C 97.0-100.4 F 38.1-39.0 C 100.5-102.3 F ? 39.1 C ? 102.4 F      NEWS Score: 9  · High risk    Overall Daily Picture:    Unchanged    Presence of secondary bacterial Infection:  No   Additional antibiotics: No    Labs, X rays reviewed: 12/28/2020    BUN: 19-->20  Cr: 0.74-->0.63    WBC: 8.1-->9.3  Hb: 13.2-->12.4  Plat: 230 -->335    Absolute Neutrophils: 6.39  Absolute Lymphocytes: 1.33  Neutrophil/Lymphocyte Ratio: 4.8    CRP: 76 .7  Ferritin: 152  LDH: 389    Pro Calcitonin: 0.08      Cultures:  Urine:  ·   Blood:  ·   Sputum :  ·   Wound:       CXR:     12/25/2020  No cardiomegaly or interstitial edema.       Subtle non consolidating pneumonia appears to be developing in both lower   lobes             Discussed with patient, RN, CC, IM.     I have personally reviewed the past medical history, past surgical history, medications, social history, and family history, and I have updated the database accordingly.   Past Medical History:     Past Medical History:   Diagnosis Date    Arthritis     Diabetes mellitus ([address]     Hyperlipidemia 11/9/2016    Hypertension     Hypokalemia with normal acid-base balance 9/13/2016    Pneumonia due to COVID-19 virus 12/25/2020    Type 2 diabetes mellitus with hyperosmolarity not at goal [facility]) 9/13/2016       Past Surgical  History:     Past Surgical History:   Procedure Laterality Date    EYE SURGERY      HYSTERECTOMY      HYSTERECTOMY, TOTAL ABDOMINAL      TUBAL LIGATION         Medications:      dexamethasone  6 mg Oral Daily    amLODIPine  10 mg Oral Daily    carvedilol  6.25 mg Oral BID    Vitamin D  1,000 Units Oral Daily    glipiZIDE  5 mg Oral BID AC    lisinopril  20 mg Oral Daily    metFORMIN  1,000 mg Oral Daily with breakfast    enoxaparin  40 mg Subcutaneous BID    insulin lispro  0-12 Units Subcutaneous TID WC    insulin lispro  0-6 Units Subcutaneous Nightly    insulin glargine  12 Units Subcutaneous Nightly    remdesivir IVPB  100 mg Intravenous Q24H       Social History:     Social History     Socioeconomic History    Marital status:      Spouse name: Not on file    Number of children: Not on file    Years of education: Not on file    Highest education level: Not on file   Occupational History    Not on file   Social Needs    Financial resource strain: Not on file    Food insecurity     Worry: Not on file     Inability: Not on file    Transportation needs     Medical: Not on file     Non-medical: Not on file   Tobacco Use    Smoking status: Never Smoker    Smokeless tobacco: Never Used   Substance and Sexual Activity    Alcohol use: No    Drug use: No    Sexual activity: Not on file   Lifestyle    Physical activity     Days per week: Not on file     Minutes per session: Not on file    Stress: Not on file   Relationships    Social connections     Talks on phone: Not on file     Gets together: Not on file     Attends Advent service: Not on file     Active member of club or organization: Not on file     Attends meetings of clubs or organizations: Not on file     Relationship status: Not on file    Intimate partner violence     Fear of current or ex partner: Not on file     Emotionally abused: Not on file     Physically abused: Not on file     Forced sexual activity: Not on file   Other Topics Concern    Not on file   Social History Narrative    Not on file       Family History:   History reviewed. No pertinent family history. Allergies:   Atorvastatin and Codeine     Review of Systems:     Patient assessed 12/26/2020  Constitutional: No fevers or chills. No systemic complaints  Head: No headaches  Eyes: No double vision or blurry vision. No conjunctival inflammation. ENT: No sore throat or runny nose. . No hearing loss, tinnitus or vertigo. Cardiovascular: No chest pain or palpitations. No Shortness of breath. Positive BEARD  Lung: Positive cough with sputum production  Abdomen: No nausea, vomiting, diarrhea, or abdominal pain. Vonzella Goodpasture No cramps. Genitourinary: No increased urinary frequency, or dysuria. No hematuria. No suprapubic or CVA pain  Musculoskeletal: No muscle aches or pains. No joint effusions, swelling or deformities  Hematologic: No bleeding or bruising. Neurologic: No headache, weakness, numbness, or tingling. Integument: No rash, no ulcers. Psychiatric: No depression. Endocrine: No polyuria, no polydipsia, no polyphagia.     Physical Examination :     Patient Vitals for the past 8 hrs:   BP Temp Temp src Pulse Resp SpO2   12/28/20 1131 -- -- -- -- 20 92 %   12/28/20 0853 -- -- -- -- 17 (!) 86 %   12/28/20 0852 -- -- -- 65 -- --   12/28/20 0847 (!) 161/75 98.5 °F (36.9 °C) Oral 75 20 (!) 86 %   12/28/20 0533 -- -- -- 59 -- --   12/28/20 0417 -- 98.1 °F (36.7 °C) Axillary -- -- THE CHEST 12/25/2020 7:48 am COMPARISON: 05/26/2019 HISTORY: ORDERING SYSTEM PROVIDED HISTORY: productive cough, chest tightness TECHNOLOGIST PROVIDED HISTORY: productive cough, chest tightness FINDINGS: No cardiomegaly or interstitial edema was noted. Subtle non consolidating pneumonia appears to be developing in both lower lobes. No pleural effusion was identified. The regional skeleton was unremarkable.      No cardiomegaly or interstitial edema. Subtle non consolidating pneumonia appears to be developing in both lower lobes.        Medical Decision [id number]:       Medical Decision Making-Other:     Note:  · Labs, medications, radiologic studies were reviewed with personal review of films  · Moderate Large amounts of data were reviewed  · Discussed with nursing Staff, Discharge planner  · Infection Control and Prevention measures reviewed  · All prior entries were reviewed  · Administer medications as ordered  · Prognosis: Guarded  · Discharge planning reviewed  · Follow up as outpatient. Thank you for allowing us to participate in the care of this patient. Please call with questions. Mauricio Johnson MD     ATTESTATION:    I have discussed the case, including pertinent history and exam findings with the APRN. I have evaluated the  History, physical findings and pictures of the patient and the key elements of the encounter have been performed by me. I have reviewed the laboratory data, other diagnostic studies and discussed them with the APRN. I have updated the medical record where necessary. I agree with the assessment, plan and orders as documented by the APRN.     Julio C Hsu MD.      Pager: [phone] - Office: [phone]

## 2020-12-28 NOTE — FLOWSHEET NOTE
VIA PHONE- due to pt in isolation. Assessment: The patient shared her amelia with the . Intervention: The  actively listened, nurtured hope and prayed with the patient. Outcome: Patient engaged in the conversation and expressed their feelings. The patient was comforted by the prayer. Plan: Chaplains will remain available to offer spiritual and emotional support as needed. 12/27/20 1931   Encounter Summary   Services provided to: Patient   Referral/Consult From: [facility];Family members   Place of [address] [place] [address]. No   Continue Visiting   (12/27/20)   Complexity of Encounter Moderate   Length of Encounter 15 minutes   Spiritual Assessment Completed Yes   Routine   Type Initial   Assessment Calm; Approachable   Intervention Active listening;Nurtured hope;Prayer   Outcome Engaged in conversation;Comfort

## 2020-12-29 ENCOUNTER — APPOINTMENT (OUTPATIENT)
Dept: GENERAL RADIOLOGY | Age: 80
DRG: 177 | End: 2020-12-29
Payer: MEDICARE

## 2020-12-29 LAB
ABSOLUTE EOS #: 0 K/UL (ref 0–0.4)
ABSOLUTE IMMATURE GRANULOCYTE: 0 K/UL (ref 0–0.3)
ABSOLUTE LYMPH #: 2.2 K/UL (ref 1–4.8)
ABSOLUTE MONO #: 0.35 K/UL (ref 0.1–0.8)
ANION GAP SERPL CALCULATED.3IONS-SCNC: 8 MMOL/L (ref 9–17)
BASOPHILS # BLD: 0 % (ref 0–2)
BASOPHILS ABSOLUTE: 0 K/UL (ref 0–0.2)
BNP INTERPRETATION: NORMAL
BUN BLDV-MCNC: 22 MG/DL (ref 8–23)
BUN/CREAT BLD: ABNORMAL (ref 9–20)
CALCIUM SERPL-MCNC: 8.2 MG/DL (ref 8.6–10.4)
CHLORIDE BLD-SCNC: 106 MMOL/L (ref 98–107)
CO2: 25 MMOL/L (ref 20–31)
CREAT SERPL-MCNC: 0.63 MG/DL (ref 0.5–0.9)
DIFFERENTIAL TYPE: ABNORMAL
EOSINOPHILS RELATIVE PERCENT: 0 % (ref 1–4)
GFR AFRICAN AMERICAN: >60 ML/MIN
GFR NON-AFRICAN AMERICAN: >60 ML/MIN
GFR SERPL CREATININE-BSD FRML MDRD: ABNORMAL ML/MIN/{1.73_M2}
GFR SERPL CREATININE-BSD FRML MDRD: ABNORMAL ML/MIN/{1.73_M2}
GLUCOSE BLD-MCNC: 227 MG/DL (ref 70–99)
GLUCOSE BLD-MCNC: 291 MG/DL (ref 65–105)
HCT VFR BLD CALC: 40 % (ref 36.3–47.1)
HEMOGLOBIN: 12.6 G/DL (ref 11.9–15.1)
IMMATURE GRANULOCYTES: 0 %
LYMPHOCYTES # BLD: 25 % (ref 24–44)
MCH RBC QN AUTO: 27.8 PG (ref 25.2–33.5)
MCHC RBC AUTO-ENTMCNC: 31.5 G/DL (ref 28.4–34.8)
MCV RBC AUTO: 88.1 FL (ref 82.6–102.9)
MONOCYTES # BLD: 4 % (ref 1–7)
MORPHOLOGY: ABNORMAL
NRBC AUTOMATED: 0.5 PER 100 WBC
NUCLEATED RED BLOOD CELLS: 1 PER 100 WBC
PDW BLD-RTO: 15.1 % (ref 11.8–14.4)
PLATELET # BLD: 362 K/UL (ref 138–453)
PLATELET ESTIMATE: ABNORMAL
PMV BLD AUTO: 10.6 FL (ref 8.1–13.5)
POTASSIUM SERPL-SCNC: 4.2 MMOL/L (ref 3.7–5.3)
PRO-BNP: 63 PG/ML
PROCALCITONIN: 0.06 NG/ML
RBC # BLD: 4.54 M/UL (ref 3.95–5.11)
RBC # BLD: ABNORMAL 10*6/UL
SEG NEUTROPHILS: 71 % (ref 36–66)
SEGMENTED NEUTROPHILS ABSOLUTE COUNT: 6.25 K/UL (ref 1.8–7.7)
SODIUM BLD-SCNC: 139 MMOL/L (ref 135–144)
WBC # BLD: 8.8 K/UL (ref 3.5–11.3)
WBC # BLD: ABNORMAL 10*3/UL

## 2020-12-29 PROCEDURE — 84145 PROCALCITONIN (PCT): CPT

## 2020-12-29 PROCEDURE — 6370000000 HC RX 637 (ALT 250 FOR IP): Performed by: INTERNAL MEDICINE

## 2020-12-29 PROCEDURE — 94761 N-INVAS EAR/PLS OXIMETRY MLT: CPT

## 2020-12-29 PROCEDURE — 2500000003 HC RX 250 WO HCPCS: Performed by: INTERNAL MEDICINE

## 2020-12-29 PROCEDURE — 2060000000 HC ICU INTERMEDIATE R&B

## 2020-12-29 PROCEDURE — 6360000002 HC RX W HCPCS: Performed by: INTERNAL MEDICINE

## 2020-12-29 PROCEDURE — 36415 COLL VENOUS BLD VENIPUNCTURE: CPT

## 2020-12-29 PROCEDURE — 83880 ASSAY OF NATRIURETIC PEPTIDE: CPT

## 2020-12-29 PROCEDURE — 82947 ASSAY GLUCOSE BLOOD QUANT: CPT

## 2020-12-29 PROCEDURE — 2580000003 HC RX 258: Performed by: INTERNAL MEDICINE

## 2020-12-29 PROCEDURE — 71045 X-RAY EXAM CHEST 1 VIEW: CPT

## 2020-12-29 PROCEDURE — 99232 SBSQ HOSP IP/OBS MODERATE 35: CPT | Performed by: INTERNAL MEDICINE

## 2020-12-29 PROCEDURE — 97535 SELF CARE MNGMENT TRAINING: CPT

## 2020-12-29 PROCEDURE — 85025 COMPLETE CBC W/AUTO DIFF WBC: CPT

## 2020-12-29 PROCEDURE — 80048 BASIC METABOLIC PNL TOTAL CA: CPT

## 2020-12-29 PROCEDURE — 2700000000 HC OXYGEN THERAPY PER DAY

## 2020-12-29 RX ORDER — ALBUTEROL SULFATE 90 UG/1
2 AEROSOL, METERED RESPIRATORY (INHALATION) EVERY 6 HOURS PRN
Status: DISCONTINUED | OUTPATIENT
Start: 2020-12-29 | End: 2021-01-01 | Stop reason: HOSPADM

## 2020-12-29 RX ADMIN — INSULIN LISPRO 4 UNITS: 100 INJECTION, SOLUTION INTRAVENOUS; SUBCUTANEOUS at 12:30

## 2020-12-29 RX ADMIN — GLIPIZIDE 5 MG: 5 TABLET ORAL at 10:16

## 2020-12-29 RX ADMIN — CARVEDILOL 6.25 MG: 6.25 TABLET, FILM COATED ORAL at 20:57

## 2020-12-29 RX ADMIN — INSULIN LISPRO 3 UNITS: 100 INJECTION, SOLUTION INTRAVENOUS; SUBCUTANEOUS at 20:57

## 2020-12-29 RX ADMIN — INSULIN LISPRO 6 UNITS: 100 INJECTION, SOLUTION INTRAVENOUS; SUBCUTANEOUS at 18:00

## 2020-12-29 RX ADMIN — Medication 1000 UNITS: at 10:16

## 2020-12-29 RX ADMIN — AMLODIPINE BESYLATE 10 MG: 10 TABLET ORAL at 10:16

## 2020-12-29 RX ADMIN — ENOXAPARIN SODIUM 40 MG: 40 INJECTION SUBCUTANEOUS at 10:16

## 2020-12-29 RX ADMIN — GLIPIZIDE 5 MG: 5 TABLET ORAL at 20:57

## 2020-12-29 RX ADMIN — INSULIN GLARGINE 12 UNITS: 100 INJECTION, SOLUTION SUBCUTANEOUS at 20:57

## 2020-12-29 RX ADMIN — CARVEDILOL 6.25 MG: 6.25 TABLET, FILM COATED ORAL at 10:16

## 2020-12-29 RX ADMIN — METFORMIN HYDROCHLORIDE 1000 MG: 500 TABLET ORAL at 10:16

## 2020-12-29 RX ADMIN — INSULIN LISPRO 4 UNITS: 100 INJECTION, SOLUTION INTRAVENOUS; SUBCUTANEOUS at 08:45

## 2020-12-29 RX ADMIN — LISINOPRIL 20 MG: 20 TABLET ORAL at 10:16

## 2020-12-29 RX ADMIN — ENOXAPARIN SODIUM 40 MG: 40 INJECTION SUBCUTANEOUS at 20:57

## 2020-12-29 RX ADMIN — REMDESIVIR 100 MG: 100 INJECTION, POWDER, LYOPHILIZED, FOR SOLUTION INTRAVENOUS at 17:00

## 2020-12-29 RX ADMIN — DEXAMETHASONE 6 MG: 4 TABLET ORAL at 10:16

## 2020-12-29 NOTE — PLAN OF CARE
Problem: Falls - Risk of:  Goal: Will remain free from falls  Description: Will remain free from falls  12/28/2020 1924 by Alejandro Ramírez RN  Outcome: Met This Shift  12/28/2020 0533 by Trever Isabel RN  Outcome: Ongoing  Goal: Absence of physical injury  Description: Absence of physical injury  12/28/2020 1924 by Alejandro Ramírez RN  Outcome: Met This Shift  12/28/2020 0533 by Trever Isabel RN  Outcome: Ongoing   Patient remained free of falls during shift. Bed in lowest position. Bed breaks locked. 2/4 Side rails up. Call light and bed side table within reach. Continue to monitor.

## 2020-12-29 NOTE — PROGRESS NOTES
Occupational Therapy  Facility/Department: [facility]  Daily Treatment Note  NAME: Lauren Sandhu  : 1940  MRN: [id number]    Date of Service: 2020    Discharge Recommendations:  Patient would benefit from continued therapy after discharge   Ed on OT services, ADLs, orientation review- good return       Assessment   Performance deficits / Impairments: Decreased functional mobility ; Decreased ADL status; Decreased strength;Decreased high-level IADLs;Decreased balance  Prognosis: Good  REQUIRES OT FOLLOW UP: Yes  Activity Tolerance  Activity Tolerance: Patient Tolerated treatment well  Safety Devices  Safety Devices in place: Yes  Type of devices: All fall risk precautions in place; Bed alarm in place;Call light within reach; Left in bed;Nurse notified         Patient Diagnosis(es): The encounter diagnosis was COVID-19.      has a past medical history of Arthritis, Diabetes mellitus ([facility] [address] Hyperlipidemia, Hypertension, Hypokalemia with normal acid-base balance, Pneumonia due to COVID-19 virus, and Type 2 diabetes mellitus with hyperosmolarity not at goal [facility]). has a past surgical history that includes Hysterectomy; Hysterectomy, total abdominal; Tubal ligation; and eye surgery. Restrictions  Restrictions/Precautions  Restrictions/Precautions: Isolation, Fall Risk  Required Braces or Orthoses?: No  Position Activity Restriction  Other position/activity restrictions: up with assist  Subjective   General  Patient assessed for rehabilitation services?: Yes  Family / Caregiver Present: No  Vital Signs  Patient Currently in Pain: No  Oxygen Therapy  SpO2: (91%)  Pulse Oximeter Device Mode: Continuous  Pulse Oximeter Device Location: Finger  O2 Device: Simple Mask  O2 Flow Rate (L/min): (10L/min)   Orientation  Orientation  Overall Orientation Status: Within Functional Limits  Objective    Pt in bed upon arrival awake. Pt demo bed mob to sit on EOB to complete ADLs.  Pt retired to bed at end of session. ADL  Feeding: Setup;Modified independent ; Increased time to complete(seated on EOB)  Grooming: Setup;Modified independent ; Increased time to complete(seated on EOB)  UE Bathing: Setup; Increased time to complete;Stand by assistance  LE Bathing: Setup; Increased time to complete;Stand by assistance  UE Dressing: Setup;Stand by assistance; Increased time to complete  LE Dressing: Setup;Stand by assistance  Additional Comments: pt completed ADLs seated on EOB.         Balance  Sitting Balance: Modified independent   Standing Balance: Stand by assistance  Bed mobility  Supine to Sit: Supervision  Sit to Supine: Supervision  Scooting: Supervision  Comment: pt used bed rails and HOB elevated  Transfers  Sit to stand: Stand by assistance  Stand to sit: Stand by assistance  Attendance  Participation: Active participation         Plan   Plan  Times per week: 3-5 x/wk  Current Treatment Recommendations: Balance Training, Functional Mobility Training, Endurance Training, Self-Care / ADL, Safety Education & Training       Goals  Short term goals  Time Frame for Short term goals: By discharge, pt will:  Short term goal 1: Demo I with setup for UB and LB ADLs and toileting tasks  Short term goal 2: Demo I for dynamic functional mobility task to increase safety and independence with ADLs/IADLs  Short term goal 3: Demo 10+ minutes dynamic standing task to increase safety and independence with ADLs/IADLs  Short term goal 4: Demo I with EC/WS techniques to incorporate throughout ADLs/IADLs  Short term goal 5: Demo I with safety awareness and pursed lip breathing throughout functional mobility and ADL tasks       Therapy Time   Individual Concurrent Group Co-treatment   Time In  8:45         Time Out  9:25         Minutes  40             time min: 40 min    BILLY Gold/DEA

## 2020-12-29 NOTE — PROGRESS NOTES
Remdesivir  Patient had questions about med and wanted to hold off at this time. [facility], NP notified via perfect serve. Night shift nurse made aware and stated she would follow up with physician. Continue to monitor.

## 2020-12-29 NOTE — PROGRESS NOTES
[facility] - Progress Note   COVID 19 Patient  Today's Date and Time: 12/29/2020, 11:10 AM    Impression :   · COVID 19 Suspect  · COVID 19 Confirmed Infection-12/25/2020  · Covid tests:Positive:  12/25/2020  · Elevated inflammatory markers   · Productive cough  · Acute Hypoxic respiratory failure: On 6 L nasal cannula    Recommendations:   · Antibiotic Rx:  · Monitor off antibiotics  · Covid Rx:  · Clinical Research will approach patient to explore if she qualifies for any of the COVID 19 treatment protocols. · Remdesivir- therapy started 12/25/2020. Stop date 12-29-20  · Convalescent Plasma-1 unit given 12/25/2020  · Decadron 6 mg daily x10 days started 12/25/2020    Medical Decision Making/Summary/Discussion:12/29/2020     · Patient admitted with suspected COVID 19 infection  · Covid test confirmed positive. 12/25/2020  · On Rx with remdesivir, plasma and decadron  Infection Control Recommendations   · Universal Precautions  · Airborne isolation  · Droplet Isolation    Antimicrobial Stewardship Recommendations     No antimicrobial therapy needed at this time  Coordination of Outpatient Care:   · Estimated Length of IV antimicrobials:TBD  · Patient will need Midline Catheter Insertion: TBD  · Patient will need PICC line Insertion: No  · Patient will need: Home IV , [place],  SNF,  LTAC:TBD  · Patient will need outpatient wound care:No    Chief complaint/reason for consultation:   · Concern for COVID infection      History of Present Illness:   Harjit De Santiago is a 78y.o.-year-old -American female with a past medical history of diabetes mellitus type 2, hypertension, hyperlipidemia, and arthritis who was initially admitted on 12/25/2020. Patient seen at the request of Dr. Harlan Shukla.     INITIAL HISTORY:    Patient presented through ER with complaints of fatigue, myalgias and chills x4 days, with newer onset cough with yellow sputum production and pleuritic chest pain x36 hours. She denied fever, diarrhea, vomiting, or abdominal pain. Patient received 1 unit of plasma, as well as remdesivir and Decadron therapy 12/25/2020. She was admitted to [facility] ICU placed on 6 L nasal cannula. Patient admitted because of concerns with COVID 19.    CURRENT EVALUATION : 12/29/2020    Afebrile  VS stable    Patient still having dyspnea and hypoxia  Was changed to NRB mask and now on simple mask    Her pain has subsided  Overall feels better except for hypoxia      Patient exhibiting respiratory distress. Yes  Respiratory secretions: Thin yellow with cough    Patient receiving supplemental oxygen. 6 L nasal cannula-->Facial mask 10 L/min  02 sat: 90 to 94-->91 %  RR: 20-24-->20        NEWS Score: 0-4 Low risk group; 5-6: Medium risk group; 7 or above: High risk group  Parameters 3 2 1 0 1 2 3   Age    < 65   ? 65   RR ? 8  9-11 12-20  21-24 ? 25   O2 Sats ? 91 92-93 94-95 ? 96      Suppl O2  Yes  No      SBP ? 90  101-110 111-219   ? 220   HR ? 40  41-50 51-90  111-130 ? 131   Consciousness    Alert   Drowsiness, lethargy, or confusion   Temperature ? 35.0 C (95.0 F)  35.1-36.0 C 95.1-96.9 F 36.1-38.0 C 97.0-100.4 F 38.1-39.0 C 100.5-102.3 F ? 39.1 C ? 102.4 F      NEWS Score: 9  · High risk    Overall Daily Picture:    Unchanged    Presence of secondary bacterial Infection:  No   Additional antibiotics: No    Labs, X rays reviewed: 12/29/2020    BUN: 19-->20-->22  Cr: 0.74-->0.63    WBC: 8.1-->9.3-->8.8  Hb: 13.2-->12.6  Plat: 230 -->335-->362    Absolute Neutrophils: 6.39  Absolute Lymphocytes: 1.33  Neutrophil/Lymphocyte Ratio: 4.8    CRP: 76 .7  Ferritin: 152  LDH: 389    Pro Calcitonin: 0.08      Cultures:  Urine:  ·   Blood:  ·   Sputum :  ·   Wound:       CXR:   · 12-25-20:Subtle non consolidating pneumonia appears to be developing in both lower lobes  · 12-29-20: Pending      12/25/2020            Discussed with patient, RN, CC, IM.     I have personally reviewed the past medical history, past surgical history, medications, social history, and family history, and I have updated the database accordingly.   Past Medical History:     Past Medical History:   Diagnosis Date    Arthritis     Diabetes mellitus ([address]     Hyperlipidemia 11/9/2016    Hypertension     Hypokalemia with normal acid-base balance 9/13/2016    Pneumonia due to COVID-19 virus 12/25/2020    Type 2 diabetes mellitus with hyperosmolarity not at goal [facility]) 9/13/2016       Past Surgical  History:     Past Surgical History:   Procedure Laterality Date    EYE SURGERY      HYSTERECTOMY      HYSTERECTOMY, TOTAL ABDOMINAL      TUBAL LIGATION         Medications:      dexamethasone  6 mg Oral Daily    amLODIPine  10 mg Oral Daily    carvedilol  6.25 mg Oral BID    Vitamin D  1,000 Units Oral Daily    glipiZIDE  5 mg Oral BID AC    lisinopril  20 mg Oral Daily    metFORMIN  1,000 mg Oral Daily with breakfast    enoxaparin  40 mg Subcutaneous BID    insulin lispro  0-12 Units Subcutaneous TID WC    insulin lispro  0-6 Units Subcutaneous Nightly    insulin glargine  12 Units Subcutaneous Nightly    remdesivir IVPB  100 mg Intravenous Q24H       Social History:     Social History     Socioeconomic History    Marital status:      Spouse name: Not on file    Number of children: Not on file    Years of education: Not on file    Highest education level: Not on file   Occupational History    Not on file   Social Needs    Financial resource strain: Not on file    Food insecurity     Worry: Not on file     Inability: Not on file    Transportation needs     Medical: Not on file     Non-medical: Not on file   Tobacco Use    Smoking status: Never Smoker    Smokeless tobacco: Never Used   Substance and Sexual Activity    Alcohol use: No    Drug use: No    Sexual activity: Not on file   Lifestyle    Physical activity     Days per week: Not on file     Minutes per session: Not on file    Stress: Not on file   Relationships    Social connections     Talks on phone: Not on file     Gets together: Not on file     Attends Shinto service: Not on file     Active member of club or organization: Not on file     Attends meetings of clubs or organizations: Not on file     Relationship status: Not on file    Intimate partner violence     Fear of current or ex partner: Not on file     Emotionally abused: Not on file     Physically abused: Not on file     Forced sexual activity: Not on file   Other Topics Concern    Not on file   Social History Narrative    Not on file       Family History:   History reviewed. No pertinent family history. Allergies:   Atorvastatin and Codeine     Review of Systems:     Patient assessed 12/26/2020  Constitutional: No fevers or chills. No systemic complaints  Head: No headaches  Eyes: No double vision or blurry vision. No conjunctival inflammation. ENT: No sore throat or runny nose. . No hearing loss, tinnitus or vertigo. Cardiovascular: No chest pain or palpitations. No Shortness of breath. Positive BEARD  Lung: Positive cough with sputum production  Abdomen: No nausea, vomiting, diarrhea, or abdominal pain. Linda Profit No cramps. Genitourinary: No increased urinary frequency, or dysuria. No hematuria. No suprapubic or CVA pain  Musculoskeletal: No muscle aches or pains. No joint effusions, swelling or deformities  Hematologic: No bleeding or bruising. Neurologic: No headache, weakness, numbness, or tingling. Integument: No rash, no ulcers. Psychiatric: No depression. Endocrine: No polyuria, no polydipsia, no polyphagia. Physical Examination :     Patient Vitals for the past 8 hrs:   BP Pulse Resp SpO2   12/29/20 0947 -- -- -- 91 %   12/29/20 0452 123/76 66 20 91 %     General Appearance: Awake, alert, and in no apparent distress  Head:  Normocephalic, no trauma  Eyes: Pupils equal, round, reactive to light; sclera anicteric; conjunctivae pink. No embolic phenomena.   ENT: Oropharynx clear, without erythema, exudate, or thrush. No tenderness of sinuses. Mouth/throat: mucosa pink and moist. No lesions. Dentition in good repair. Neck:Supple, without lymphadenopathy. Thyroid normal, No bruits. Pulmonary/Chest: Clear to auscultation, without wheezes, rales, or rhonchi. No dullness to percussion. Cardiovascular: Regular rate and rhythm without murmurs, rubs, or gallops. Abdomen: Soft, non tender. Bowel sounds normal. No organomegaly  All four Extremities: No cyanosis, clubbing, edema, or effusions. Neurologic: No gross sensory or motor deficits. Skin: Warm and dry with good turgor. No signs of peripheral arterial or venous insufficiency. No ulcerations. No open wounds. Medical Decision Making -Laboratory:   I have independently reviewed/ordered the following labs:    CBC with Differential:   Recent Labs     12/28/20  0621 12/29/20  0403   WBC 9.3 8.8   HGB 12.4 12.6   HCT 39.8 40.0    362   LYMPHOPCT 19* 25   MONOPCT 5 4     BMP:   Recent Labs     12/27/20  0529 12/28/20  0621 12/29/20  0403    140 139   K 4.1 3.9 4.2    105 106   CO2 24 25 25   BUN 21 20 22   CREATININE 0.67 0.63 0.63   MG 2.4  --   --      Hepatic Function Panel:   Recent Labs     12/27/20  0529   LABALBU 3.0*     No results for input(s): RPR in the last 72 hours. No results for input(s): HIV in the last 72 hours. No results for input(s): BC in the last 72 hours.   Lab Results   Component Value Date    RBC 4.54 12/29/2020    WBC 8.8 12/29/2020     Lab Results   Component Value Date    CREATININE 0.63 12/29/2020    GLUCOSE 227 12/29/2020       Medical Decision Making-Imaging:   Xr Chest Portable     Result Date: 12/25/2020  EXAMINATION: ONE XRAY VIEW OF THE CHEST 12/25/2020 7:48 am COMPARISON: 05/26/2019 HISTORY: ORDERING SYSTEM PROVIDED HISTORY: productive cough, chest tightness TECHNOLOGIST PROVIDED HISTORY: productive cough, chest tightness FINDINGS: No cardiomegaly or interstitial edema was noted. Subtle non consolidating pneumonia appears to be developing in both lower lobes. No pleural effusion was identified. The regional skeleton was unremarkable.      No cardiomegaly or interstitial edema. Subtle non consolidating pneumonia appears to be developing in both lower lobes.        Medical Decision [id number]:       Medical Decision Making-Other:     Note:  · Labs, medications, radiologic studies were reviewed with personal review of films  · Moderate Large amounts of data were reviewed  · Discussed with nursing Staff, Discharge planner  · Infection Control and Prevention measures reviewed  · All prior entries were reviewed  · Administer medications as ordered  · Prognosis: Guarded  · Discharge planning reviewed  · Follow up as outpatient. Thank you for allowing us to participate in the care of this patient. Please call with questions.     Denys Klinefelter, MD         Pager: [phone] - Office: [phone]

## 2020-12-29 NOTE — PLAN OF CARE
Problem: Airway Clearance - Ineffective  Goal: Achieve or maintain patent airway  Outcome: Ongoing     Problem: Gas Exchange - Impaired  Goal: Absence of hypoxia  Outcome: Ongoing  Goal: Promote optimal lung function  Outcome: Ongoing     Problem: Breathing Pattern - Ineffective  Goal: Ability to achieve and maintain a regular respiratory rate  Outcome: Ongoing     Problem:  Body Temperature -  Risk of, Imbalanced  Goal: Ability to maintain a body temperature within defined limits  Outcome: Ongoing  Goal: Will regain or maintain usual level of consciousness  Outcome: Ongoing  Goal: Complications related to the disease process, condition or treatment will be avoided or minimized  Outcome: Ongoing     Problem: Isolation Precautions - Risk of Spread of Infection  Goal: Prevent transmission of infection  Outcome: Ongoing     Problem: Nutrition Deficits  Goal: Optimize nutrtional status  Outcome: Ongoing     Problem: Risk for Fluid Volume Deficit  Goal: Maintain normal heart rhythm  Outcome: Ongoing  Goal: Maintain absence of muscle cramping  Outcome: Ongoing  Goal: Maintain normal serum potassium, sodium, calcium, phosphorus, and pH  Outcome: Ongoing     Problem: Loneliness or Risk for Loneliness  Goal: Demonstrate positive use of time alone when socialization is not possible  Outcome: Ongoing     Problem: Fatigue  Goal: Verbalize increase energy and improved vitality  Outcome: Ongoing     Problem: Patient Education: Go to Patient Education Activity  Goal: Patient/Family Education  Outcome: Ongoing     Problem: Pain:  Goal: Pain level will decrease  Description: Pain level will decrease  Outcome: Ongoing  Goal: Control of acute pain  Description: Control of acute pain  Outcome: Ongoing  Goal: Control of chronic pain  Description: Control of chronic pain  Outcome: Ongoing     Problem: Falls - Risk of:  Goal: Will remain free from falls  Description: Will remain free from falls  12/29/2020 0409 by Davi Dill RN  Outcome: Ongoing  12/28/2020 1924 by Denver Oconnor RN  Outcome: Met This Shift  Goal: Absence of physical injury  Description: Absence of physical injury  12/29/2020 0409 by Cynthia Leal RN  Outcome: Ongoing  12/28/2020 1924 by Denver Oconnor RN  Outcome: Met This Shift

## 2020-12-29 NOTE — PROGRESS NOTES
[facility]  Office: [address], DO, Norberto Clink, DO, Nimco Lintonon, DO, Jaime Calero Blood, DO, Lenard Hendrix MD, Karen Pantoja MD, Diego Hinds MD, Yolanda Franklin MD, Jasper Willson MD, Judy Cavanaugh MD, Libia Cobian MD, Lion Hilliard MD, Christian Nathan MD, Perry Spivey, DO, Rhonda Dunbar MD, Viri Adams MD, Alicia Jay, DO, Allan Keating MD,  Lelo Finley, DO, Leisa Brenner MD, Zhou Haddad MD, Jesse Juarez, Flavio Hayden, CNP, Evelin Hays, CNP, Ashutosh Muniz, CNS, Naif Saenz, CNP, Frannie Bone, CNP, Jesica Guadarrama, CNP, Ritika Pimentel, CNP, Dawit Campa, CNP, Zhen Newell PA-C, Ky Gosselin, [facility], Mk Darby, CNP, Alireza, CNP, Ivette Singh, CNP, Leopoldo Haynes, CNP, Yulisa Darby, CNP         [facility]   [address] [facility]    Progress Note    12/29/2020    2:42 PM    Name:   Sri Posadas  MRN:     [id number]     Acct:      [de-identified]   Room:   [facility]/1006-01   Day:  4  Admit Date:  12/25/2020  6:49 AM    PCP:   Kishore Sevilla MD  Code Status:  Full Code    Subjective:     C/C:   Chief Complaint   Patient presents with    Chest Pain    Cough     Interval History Status: worsened. Discussed transitioning patient to high flow nasal cannula and BiPAP at night. Discussed pulmonary consult. No other complaints today    Brief History: This is an 70-year-old Afro-American female presents with a complaint of chest pressure, cough and increasing fatigue.  Her symptoms started this past Monday and have progressively worsened throughout the week.  She presented here and was found to have evidence of COVID-19 pneumonia is been admitted and started on Decadron, remdesivir and received 1 dose of convalescent plasma.     Review of Systems:     Constitutional:  negative for chills, fevers, sweats  Respiratory:  negative for cough, dyspnea on exertion, shortness of breath, wheezing  Cardiovascular:  negative for chest pain, chest pressure/discomfort, lower extremity edema, palpitations  Gastrointestinal:  negative for abdominal pain, constipation, diarrhea, nausea, vomiting  Neurological:  negative for dizziness, headache    Medications: Allergies: Allergies   Allergen Reactions    Atorvastatin      Severe myopathy    Codeine        Current Meds:   Scheduled Meds:    dexamethasone  6 mg Oral Daily    amLODIPine  10 mg Oral Daily    carvedilol  6.25 mg Oral BID    Vitamin D  1,000 Units Oral Daily    glipiZIDE  5 mg Oral BID AC    lisinopril  20 mg Oral Daily    metFORMIN  1,000 mg Oral Daily with breakfast    enoxaparin  40 mg Subcutaneous BID    insulin lispro  0-12 Units Subcutaneous TID WC    insulin lispro  0-6 Units Subcutaneous Nightly    insulin glargine  12 Units Subcutaneous Nightly    remdesivir IVPB  100 mg Intravenous Q24H     Continuous Infusions:    dextrose       PRN Meds: albuterol sulfate HFA, benzonatate, potassium chloride **OR** potassium alternative oral replacement **OR** potassium chloride, magnesium sulfate, promethazine **OR** ondansetron, polyethylene glycol, nicotine, acetaminophen **OR** acetaminophen, glucose, dextrose, glucagon (rDNA), dextrose    Data:     Past Medical History:   has a past medical history of Arthritis, Diabetes mellitus ([facility] [address] Hyperlipidemia, Hypertension, Hypokalemia with normal acid-base balance, Pneumonia due to COVID-19 virus, and Type 2 diabetes mellitus with hyperosmolarity not at goal [facility]). Social History:   reports that she has never smoked. She has never used smokeless tobacco. She reports that she does not drink alcohol or use drugs. Family History: History reviewed. No pertinent family history.     Vitals:  /76   Pulse 66   Temp 98.2 °F (36.8 °C) (Oral)   Resp 20   Ht 5' 4\" (1.626 m)   Wt 185 lb 14.4 oz (84.3 kg)   SpO2 91%   BMI 31.91 kg/m²   Temp (24hrs), Av.9 °F (36.6 °C), appearance:  alert, cooperative and no distress  Mental Status:  oriented to person, place and time and normal affect  Lungs:  clear to auscultation bilaterally, normal effort, diminished throughout  Heart:  regular rate and rhythm, no murmur  Abdomen:  soft, nontender, nondistended, normal bowel sounds, no masses, hepatomegaly, splenomegaly  Extremities:  no edema, redness, tenderness in the calves  Skin:  no gross lesions, rashes, induration    Assessment:        Hospital Problems           Last Modified POA    * (Principal) Pneumonia due to COVID-19 virus 12/25/2020 Yes    Type 2 diabetes mellitus with hyperglycemia, without long-term current use of insulin ([facility] [address] 12/25/2020 Yes    Essential hypertension 12/25/2020 Yes    Acute hypoxemic respiratory failure ([facility] [address] 12/25/2020 Yes    Elevated C-reactive protein (CRP) 12/25/2020 Yes          Plan:        1. Oxygen requirement still increased, no further treatment for Covid  2. Pulmonary consult today BiPAP and  3. High flow nasal cannula during daytime, BiPAP at night  4.  Discussed possible LTAC with case management, reassess tomorrow    Leander Kamara DO  12/29/2020  2:42 PM

## 2020-12-30 LAB
ABSOLUTE EOS #: <0.03 K/UL (ref 0–0.44)
ABSOLUTE IMMATURE GRANULOCYTE: 0.17 K/UL (ref 0–0.3)
ABSOLUTE LYMPH #: 1.47 K/UL (ref 1.1–3.7)
ABSOLUTE MONO #: 0.37 K/UL (ref 0.1–1.2)
BASOPHILS # BLD: 1 % (ref 0–2)
BASOPHILS ABSOLUTE: 0.04 K/UL (ref 0–0.2)
DIFFERENTIAL TYPE: ABNORMAL
EOSINOPHILS RELATIVE PERCENT: 0 % (ref 1–4)
GLUCOSE BLD-MCNC: 175 MG/DL (ref 65–105)
GLUCOSE BLD-MCNC: 215 MG/DL (ref 65–105)
GLUCOSE BLD-MCNC: 258 MG/DL (ref 65–105)
GLUCOSE BLD-MCNC: 278 MG/DL (ref 65–105)
GLUCOSE BLD-MCNC: 282 MG/DL (ref 65–105)
HCT VFR BLD CALC: 41.7 % (ref 36.3–47.1)
HEMOGLOBIN: 12.8 G/DL (ref 11.9–15.1)
IMMATURE GRANULOCYTES: 2 %
LYMPHOCYTES # BLD: 20 % (ref 24–43)
MCH RBC QN AUTO: 27.6 PG (ref 25.2–33.5)
MCHC RBC AUTO-ENTMCNC: 30.7 G/DL (ref 28.4–34.8)
MCV RBC AUTO: 89.9 FL (ref 82.6–102.9)
MONOCYTES # BLD: 5 % (ref 3–12)
NRBC AUTOMATED: 0.7 PER 100 WBC
PDW BLD-RTO: 15.4 % (ref 11.8–14.4)
PLATELET # BLD: 370 K/UL (ref 138–453)
PLATELET ESTIMATE: ABNORMAL
PMV BLD AUTO: 10.4 FL (ref 8.1–13.5)
RBC # BLD: 4.64 M/UL (ref 3.95–5.11)
RBC # BLD: ABNORMAL 10*6/UL
SEG NEUTROPHILS: 72 % (ref 36–65)
SEGMENTED NEUTROPHILS ABSOLUTE COUNT: 5.43 K/UL (ref 1.5–8.1)
WBC # BLD: 7.5 K/UL (ref 3.5–11.3)
WBC # BLD: ABNORMAL 10*3/UL

## 2020-12-30 PROCEDURE — 6360000002 HC RX W HCPCS: Performed by: INTERNAL MEDICINE

## 2020-12-30 PROCEDURE — 97110 THERAPEUTIC EXERCISES: CPT

## 2020-12-30 PROCEDURE — 99232 SBSQ HOSP IP/OBS MODERATE 35: CPT | Performed by: INTERNAL MEDICINE

## 2020-12-30 PROCEDURE — 6370000000 HC RX 637 (ALT 250 FOR IP): Performed by: INTERNAL MEDICINE

## 2020-12-30 PROCEDURE — 97535 SELF CARE MNGMENT TRAINING: CPT

## 2020-12-30 PROCEDURE — APPSS30 APP SPLIT SHARED TIME 16-30 MINUTES: Performed by: NURSE PRACTITIONER

## 2020-12-30 PROCEDURE — 94761 N-INVAS EAR/PLS OXIMETRY MLT: CPT

## 2020-12-30 PROCEDURE — 99291 CRITICAL CARE FIRST HOUR: CPT | Performed by: INTERNAL MEDICINE

## 2020-12-30 PROCEDURE — 2060000000 HC ICU INTERMEDIATE R&B

## 2020-12-30 PROCEDURE — 97116 GAIT TRAINING THERAPY: CPT

## 2020-12-30 PROCEDURE — 85025 COMPLETE CBC W/AUTO DIFF WBC: CPT

## 2020-12-30 PROCEDURE — 36415 COLL VENOUS BLD VENIPUNCTURE: CPT

## 2020-12-30 PROCEDURE — 2700000000 HC OXYGEN THERAPY PER DAY

## 2020-12-30 RX ADMIN — CARVEDILOL 6.25 MG: 6.25 TABLET, FILM COATED ORAL at 19:57

## 2020-12-30 RX ADMIN — LISINOPRIL 20 MG: 20 TABLET ORAL at 08:14

## 2020-12-30 RX ADMIN — GLIPIZIDE 5 MG: 5 TABLET ORAL at 17:04

## 2020-12-30 RX ADMIN — Medication 1000 UNITS: at 08:14

## 2020-12-30 RX ADMIN — AMLODIPINE BESYLATE 10 MG: 10 TABLET ORAL at 08:14

## 2020-12-30 RX ADMIN — GLIPIZIDE 5 MG: 5 TABLET ORAL at 08:14

## 2020-12-30 RX ADMIN — INSULIN GLARGINE 12 UNITS: 100 INJECTION, SOLUTION SUBCUTANEOUS at 20:05

## 2020-12-30 RX ADMIN — CARVEDILOL 6.25 MG: 6.25 TABLET, FILM COATED ORAL at 08:15

## 2020-12-30 RX ADMIN — METFORMIN HYDROCHLORIDE 1000 MG: 500 TABLET ORAL at 08:12

## 2020-12-30 RX ADMIN — INSULIN LISPRO 3 UNITS: 100 INJECTION, SOLUTION INTRAVENOUS; SUBCUTANEOUS at 20:05

## 2020-12-30 RX ADMIN — INSULIN LISPRO 2 UNITS: 100 INJECTION, SOLUTION INTRAVENOUS; SUBCUTANEOUS at 08:16

## 2020-12-30 RX ADMIN — ENOXAPARIN SODIUM 40 MG: 40 INJECTION SUBCUTANEOUS at 19:57

## 2020-12-30 RX ADMIN — ENOXAPARIN SODIUM 40 MG: 40 INJECTION SUBCUTANEOUS at 08:12

## 2020-12-30 RX ADMIN — INSULIN LISPRO 6 UNITS: 100 INJECTION, SOLUTION INTRAVENOUS; SUBCUTANEOUS at 17:04

## 2020-12-30 RX ADMIN — DEXAMETHASONE 6 MG: 4 TABLET ORAL at 08:13

## 2020-12-30 RX ADMIN — INSULIN LISPRO 6 UNITS: 100 INJECTION, SOLUTION INTRAVENOUS; SUBCUTANEOUS at 12:00

## 2020-12-30 ASSESSMENT — PAIN SCALES - GENERAL: PAINLEVEL_OUTOF10: 0

## 2020-12-30 NOTE — PLAN OF CARE
Problem: Airway Clearance - Ineffective  Goal: Achieve or maintain patent airway  12/30/2020 1838 by Yogi Eden RN  Outcome: Met This Shift  12/30/2020 0500 by Markell Diallo RN  Outcome: Ongoing     Problem: Gas Exchange - Impaired  Goal: Absence of hypoxia  12/30/2020 1838 by Yogi Eden RN  Outcome: Met This Shift  12/30/2020 0500 by Markell Diallo RN  Outcome: Ongoing  Goal: Promote optimal lung function  12/30/2020 1838 by Yogi Eden RN  Outcome: Met This Shift  12/30/2020 0500 by Markell Diallo RN  Outcome: Ongoing     Problem: Breathing Pattern - Ineffective  Goal: Ability to achieve and maintain a regular respiratory rate  12/30/2020 1838 by Yogi Eden RN  Outcome: Met This Shift  12/30/2020 0500 by Markell Diallo RN  Outcome: Ongoing     Problem:  Body Temperature -  Risk of, Imbalanced  Goal: Ability to maintain a body temperature within defined limits  12/30/2020 1838 by Yogi Eden RN  Outcome: Met This Shift  12/30/2020 0500 by Markell Diallo RN  Outcome: Ongoing  Goal: Will regain or maintain usual level of consciousness  12/30/2020 1838 by Yogi Eden RN  Outcome: Met This Shift  12/30/2020 0500 by Markell Diallo RN  Outcome: Ongoing  Goal: Complications related to the disease process, condition or treatment will be avoided or minimized  12/30/2020 1838 by Yogi Eden RN  Outcome: Met This Shift  12/30/2020 0500 by Markell Diallo RN  Outcome: Ongoing     Problem: Isolation Precautions - Risk of Spread of Infection  Goal: Prevent transmission of infection  12/30/2020 1838 by Yogi Eden RN  Outcome: Met This Shift  12/30/2020 0500 by Markell Diallo RN  Outcome: Ongoing     Problem: Nutrition Deficits  Goal: Optimize nutrtional status  12/30/2020 1838 by Yogi Eden RN  Outcome: Met This Shift  12/30/2020 0500 by Markell Diallo RN  Outcome: Ongoing     Problem: Risk for Fluid Volume Deficit  Goal: Maintain normal heart rhythm  12/30/2020 1838 by Gilbert Corrigan RN  Outcome: Met This Shift  12/30/2020 0500 by Fidel Churchill RN  Outcome: Ongoing  Goal: Maintain absence of muscle cramping  12/30/2020 1838 by Gilbert Corrigan RN  Outcome: Met This Shift  12/30/2020 0500 by Fidel Churchill RN  Outcome: Ongoing  Goal: Maintain normal serum potassium, sodium, calcium, phosphorus, and pH  12/30/2020 1838 by Gilbert Corrigan RN  Outcome: Met This Shift  12/30/2020 0500 by Fidel Churchill RN  Outcome: Ongoing     Problem: Loneliness or Risk for Loneliness  Goal: Demonstrate positive use of time alone when socialization is not possible  12/30/2020 1838 by Gilbert Corrigan RN  Outcome: Met This Shift  12/30/2020 0500 by Fidel Churchill RN  Outcome: Ongoing     Problem: Fatigue  Goal: Verbalize increase energy and improved vitality  12/30/2020 1838 by Gilbert Corrigan RN  Outcome: Met This Shift  12/30/2020 0500 by Fidel Churchill RN  Outcome: Ongoing     Problem: Patient Education: Go to Patient Education Activity  Goal: Patient/Family Education  12/30/2020 1838 by Gilbert Corrigan RN  Outcome: Met This Shift  12/30/2020 0500 by Fidel Churchill RN  Outcome: Ongoing     Problem: Pain:  Description: Pain management should include both nonpharmacologic and pharmacologic interventions.   Goal: Pain level will decrease  Description: Pain level will decrease  12/30/2020 1838 by Gilbert Corrigan RN  Outcome: Met This Shift  12/30/2020 0500 by Fidel Churchill RN  Outcome: Ongoing  Goal: Control of acute pain  Description: Control of acute pain  12/30/2020 1838 by Gilbert Corrigan RN  Outcome: Met This Shift  12/30/2020 0500 by Fidel Churchill RN  Outcome: Ongoing  Goal: Control of chronic pain  Description: Control of chronic pain  12/30/2020 1838 by Gilbert Corrigan RN  Outcome: Met This Shift  12/30/2020 0500 by Fidel Churchill RN  Outcome: Ongoing     Problem: Falls - Risk of:  Goal: Will remain free from falls  Description: Will remain free from falls  12/30/2020 1838 by Fatou Lane RN  Outcome: Met This Shift  12/30/2020 0500 by Zay Figueroa RN  Outcome: Ongoing  Goal: Absence of physical injury  Description: Absence of physical injury  12/30/2020 1838 by Fatou Lane RN  Outcome: Met This Shift  12/30/2020 0500 by Zay Figueroa RN  Outcome: Ongoing

## 2020-12-30 NOTE — PROGRESS NOTES
chest pain x36 hours. She denied fever, diarrhea, vomiting, or abdominal pain.     Patient received 1 unit of plasma, as well as remdesivir and Decadron therapy 12/25/2020. She was admitted to [facility] ICU placed on 6 L nasal cannula. Patient admitted because of concerns with COVID 19.     CURRENT EVALUATION : 12/30/2020     Patient still having dyspnea now requiring hi-flow NC     Overall feels better except for hypoxia-doing well up to commode without major desaturation    Remdesevir therapy ended 12/29/20    Afebrile: 98.2  VS stable     Patient exhibiting respiratory distress. Yes  Respiratory secretions: No     Patient receiving supplemental oxygen. 6 L NC-->Facial mask 10 L/min>hi flow  02 sat:93%  RR: 16     FiO2:  55%  Flow rate: 30 L     NEWS Score: 0-4 Low risk group; 5-6: Medium risk group; 7 or above: High risk group  Parameters 3 2 1 0 1 2 3   Age       < 65     ? 65   RR ? 8   9-11 12-20   21-24 ? 25   O2 Sats ? 91 92-93 94-95 ? 96         Suppl O2   Yes   No         SBP ? 90  101-110 111-219     ? 220   HR ? 40   41-50 51-90  111-130 ? 131   Consciousness       Alert     Drowsiness, lethargy, or confusion   Temperature ? 35.0 C (95.0 F)   35.1-36.0 C 95.1-96.9 F 36.1-38.0 C 97.0-100.4 F 38.1-39.0 C 100.5-102.3 F ? 39.1 C ? 102.4 F        NEWS Score: 9  · High risk     Overall Daily Picture:   · Unchanged     Presence of secondary bacterial Infection:  No   Additional antibiotics: No     Labs, X rays reviewed: 12/30/2020       BUN: 19-->20-->22  Cr: 0.74-->0.63     WBC: 9.3-->8.8>7.5  Hb: 13.2-->12.6>12.8  Plat: 335-->362>370     Absolute Neutrophils: 6.39  Absolute Lymphocytes: 1.33  Neutrophil/Lymphocyte Ratio: 4.8     CRP: 76 .7  Ferritin: 152  LDH: 389     Pro Calcitonin: 0.08        Cultures:  Urine:  ·    Blood:  ·    Sputum :  ·    Wound:  ·       CXR:   12/29/20  Scattered areas of ground-glass attenuation most pronounced in the lower   lobes consistent with pneumonia. · 12-25-20:Subtle non consolidating pneumonia appears to be developing in both lower lobes                Discussed with patient, RN, CC, IM.     I have personally reviewed the past medical history, past surgical history, medications, social history, and family history, and I have updated the database accordingly.   Past Medical History:      Past Medical History        Past Medical History:   Diagnosis Date    Arthritis      Diabetes mellitus ([facility] [address]      Hyperlipidemia 11/9/2016    Hypertension      Hypokalemia with normal acid-base balance 9/13/2016    Pneumonia due to COVID-19 virus 12/25/2020    Type 2 diabetes mellitus with hyperosmolarity not at goal [facility]) 9/13/2016            Past Surgical  History:      Past Surgical History         Past Surgical History:   Procedure Laterality Date    EYE SURGERY        HYSTERECTOMY        HYSTERECTOMY, TOTAL ABDOMINAL        TUBAL LIGATION                Medications:      Scheduled Medications    dexamethasone  6 mg Oral Daily    amLODIPine  10 mg Oral Daily    carvedilol  6.25 mg Oral BID    Vitamin D  1,000 Units Oral Daily    glipiZIDE  5 mg Oral BID AC    lisinopril  20 mg Oral Daily    metFORMIN  1,000 mg Oral Daily with breakfast    enoxaparin  40 mg Subcutaneous BID    insulin lispro  0-12 Units Subcutaneous TID WC    insulin lispro  0-6 Units Subcutaneous Nightly    insulin glargine  12 Units Subcutaneous Nightly    remdesivir IVPB  100 mg Intravenous Q24H            Social History:      Social History               Socioeconomic History    Marital status:        Spouse name: Not on file    Number of children: Not on file    Years of education: Not on file    Highest education level: Not on file   Occupational History    Not on file   Social Needs    Financial resource strain: Not on file    Food insecurity       Worry: Not on file       Inability: Not on file    Transportation needs       Medical: Not on file       Non-medical: Not on file   Tobacco Use    Smoking status: Never Smoker    Smokeless tobacco: Never Used   Substance and Sexual Activity    Alcohol use: No    Drug use: No    Sexual activity: Not on file   Lifestyle    Physical activity       Days per week: Not on file       Minutes per session: Not on file    Stress: Not on file   Relationships    Social connections       Talks on phone: Not on file       Gets together: Not on file       Attends Yazdanism service: Not on file       Active member of club or organization: Not on file       Attends meetings of clubs or organizations: Not on file       Relationship status: Not on file    Intimate partner violence       Fear of current or ex partner: Not on file       Emotionally abused: Not on file       Physically abused: Not on file       Forced sexual activity: Not on file   Other Topics Concern    Not on file   Social History Narrative    Not on file            Family History:   Family History   History reviewed. No pertinent family history.         Allergies:   Atorvastatin and Codeine      Review of Systems: 12/30/2020      Patient assessed 12/30/20 in Covid stepdown  Constitutional: No fevers or chills. No systemic complaints  Head: No headaches  Eyes: No double vision or blurry vision. No conjunctival inflammation. ENT: No sore throat or runny nose. . No hearing loss, tinnitus or vertigo. Cardiovascular: No chest pain or palpitations. No Shortness of breath. Positive BEARD  Lung: Positive cough with sputum production  Abdomen: No nausea, vomiting, diarrhea, or abdominal pain. Staley Savant No cramps. Genitourinary: No increased urinary frequency, or dysuria. No hematuria. No suprapubic or CVA pain  Musculoskeletal: No muscle aches or pains. No joint effusions, swelling or deformities  Hematologic: No bleeding or bruising. Neurologic: No headache, weakness, numbness, or tingling. Integument: No rash, no ulcers. Psychiatric: No depression.    Endocrine: No WBC 7.5 12/30/2020     Lab Results   Component Value Date    CREATININE 0.63 12/29/2020    GLUCOSE 227 12/29/2020       Medical Decision Making-Imaging:     EXAMINATION:   ONE XRAY VIEW OF THE CHEST       12/29/2020 10:46 am       COMPARISON:   Chest radiograph performed 12/25/2020.       HISTORY:   ORDERING SYSTEM PROVIDED HISTORY: COVID, hypoxia   TECHNOLOGIST PROVIDED HISTORY:   COVID, hypoxia   Reason for Exam: upr   Acuity: Unknown   Type of Exam: Unknown       FINDINGS:   There are scattered areas of ground-glass attenuation most pronounced in the   lower lobes.  There is no effusion. Romana Mash is no pneumothorax.  The   mediastinal structures are unremarkable.  The upper abdomen is unremarkable. The extrathoracic soft tissues are unremarkable.           Impression   Scattered areas of ground-glass attenuation most pronounced in the lower   lobes consistent with pneumonia.             Medical Decision [id number]:       Medical Decision Making-Other:     Note:  · Labs, medications, radiologic studies were reviewed with personal review of films  · Moderate Large amounts of data were reviewed  · Discussed with nursing Staff, Discharge planner  · Infection Control and Prevention measures reviewed  · All prior entries were reviewed  · Administer medications as ordered  · Prognosis: Guarded  · Discharge planning reviewed  · Follow up as outpatient. Thank you for allowing us to participate in the care of this patient. Please call with questions. America Baldwin, APRN - CNP     ATTESTATION:    I have discussed the case, including pertinent history and exam findings with the APRN. I have evaluated the  History, physical findings and pictures of the patient and the key elements of the encounter have been performed by me. I have reviewed the laboratory data, other diagnostic studies and discussed them with the APRN. I have updated the medical record where necessary.     I agree with the assessment, plan and orders as documented by the APRN.     Janiya Messer MD.      Pager: [phone] - Office: [phone]

## 2020-12-30 NOTE — PROGRESS NOTES
Writer spoke with son Sherman Cid on patients condition. All questions answered. Son also wanted to give other families contact information. Writer told son that I would take down the numbers but we will only be contacting one family member, and we ask that he pass on the information given re: patients condition.     Daughter [phone]  Sister [phone]

## 2020-12-30 NOTE — PROGRESS NOTES
Physical Therapy  Facility/Department: [facility] CAR 1  Daily Treatment Note  NAME: Chandler Keenan  : 1940  MRN: [id number]    Date of Service: 2020    Discharge Recommendations:  Patient would benefit from continued therapy after discharge   PT Equipment Recommendations  Equipment Needed: No    Assessment   Body structures, Functions, Activity limitations: Decreased functional mobility ; Decreased balance;Decreased endurance  Assessment: Pt amb 40 ft without device and CGA. Limited by hi elieser, with sats to low 80's with mobility, requiring ~3 min to recover  Prognosis: Good  REQUIRES PT FOLLOW UP: Yes  Activity Tolerance  Activity Tolerance: Patient limited by endurance     Patient Diagnosis(es): The encounter diagnosis was COVID-19.     has a past medical history of Arthritis, Diabetes mellitus ([facility] [address] Hyperlipidemia, Hypertension, Hypokalemia with normal acid-base balance, Pneumonia due to COVID-19 virus, and Type 2 diabetes mellitus with hyperosmolarity not at goal [facility]). has a past surgical history that includes Hysterectomy; Hysterectomy, total abdominal; Tubal ligation; and eye surgery. Restrictions  Restrictions/Precautions  Restrictions/Precautions: Isolation, Fall Risk  Required Braces or Orthoses?: No  Position Activity Restriction  Other position/activity restrictions: up with assist, very [place]  Subjective   General  Response To Previous Treatment: Patient with no complaints from previous session.   Family / Caregiver Present: No  Subjective  Subjective: Pt resting in bed upon arrival, agreeable to PT. pleasant and cooperative  General Comment  Comments: Hi elieser 30L at 55%  Pain Screening  Patient Currently in Pain: Denies  Vital Signs  Patient Currently in Pain: Denies       Orientation  Orientation  Overall Orientation Status: Within Functional Limits  Cognition      Objective   Bed mobility  Rolling to Left: Modified independent  Rolling to Right: Modified independent  Supine to Sit: Modified

## 2020-12-30 NOTE — CONSULTS
CONSULT  Note    Patient - Brittni Baker  Date of Admission -  12/25/2020  6:49 AM  Date of Evaluation -  12/30/2020  Room and Bed Number -  1006/1006-01   Hospital Day - 5    CHIEF COMPLAINT : ACUTE HYPOXIC RESPIRATORY FAILURE DUE TO COVID -19 PNEUMONIA   HPI :   Brittni Baker 78 y.o. female admitted on 12/25/20 for covid -19 pna . She had been on nasal cannula 6 l then 10 l via simple face mask however since yestreday has been requiring high flow o2 - she is now on 55 % , 30 l , rr less than 20 - she has no tachypnea , no wob . Afebrile   Has cough   She has completed plasma , remdesivir .   On dexamethasone         SECRETIONS  -Amount:  [] Small [] Moderate  [] Large    [x] None  Color:     [] White [] Colored  [] Bloody    SEDATION:    [] Propofol gtt  [] Versed gtt  [] FENTANYL  gtt  gtt   [x] No Sedation    PARALYZED:  [x] No    [] Yes    VASOPRESSORS:  [x] No    [] Yes  [] Levophed [] Dopamine [] Vasopressin  [] Dobutamine [] Phenylephrine [] Epinephrine    INHALED NITRIC OXIDE : [x] No    [] Yes    PRONE :       [x] No    [] Yes    REMDESIVIR:             [] No    [x] Yes    DEXAMETHASONE : [] No    [x] Yes    CONVALESCENT PLASMA : [] No    [x] Yes  Past Medical History:   Diagnosis Date    Arthritis     Diabetes mellitus ([facility] [address]     Hyperlipidemia 11/9/2016    Hypertension     Hypokalemia with normal acid-base balance 9/13/2016    Pneumonia due to COVID-19 virus 12/25/2020    Type 2 diabetes mellitus with hyperosmolarity not at goal [facility]) 9/13/2016        Social History     Socioeconomic History    Marital status:      Spouse name: None    Number of children: None    Years of education: None    Highest education level: None   Occupational History    None   Social Needs    Financial resource strain: None    Food insecurity     Worry: None     Inability: None    Transportation needs     Medical: None     Non-medical: None   Tobacco Use    Smoking status: Never Smoker    Smokeless tobacco: Never Used   Substance and Sexual Activity    Alcohol use: No    Drug use: No    Sexual activity: None   Lifestyle    Physical activity     Days per week: None     Minutes per session: None    Stress: None   Relationships    Social connections     Talks on phone: None     Gets together: None     Attends Sikhism service: None     Active member of club or organization: None     Attends meetings of clubs or organizations: None     Relationship status: None    Intimate partner violence     Fear of current or ex partner: None     Emotionally abused: None     Physically abused: None     Forced sexual activity: None   Other Topics Concern    None   Social History Narrative    None       Immunization History   Administered Date(s) Administered    Influenza A (H5N1) Monovalent Vaccine, Adjuvanted-2019    Influenza Vaccine, unspecified formulation 2012, 10/07/2013, 2014, 2015, 2016    Influenza Virus Vaccine 2015, 10/07/2018    Influenza, High Dose (Fluzone 65 yrs and older) 2015, 2016, 2017    Influenza, Triv, inactivated, subunit, adjuvanted, IM (Fluad 65 yrs and older) 10/27/2018, 2019, 2020    Pneumococcal Conjugate 13-valent (Kati Sins) 2015, 12/15/2017    Pneumococcal Polysaccharide ([id number]) 2006, 2015, 2018    Td, unspecified formulation 2006    Zoster Live (Zostavax) 2011         History reviewed. No pertinent family history.       Past Surgical History:   Procedure Laterality Date    EYE SURGERY      HYSTERECTOMY      HYSTERECTOMY, TOTAL ABDOMINAL      TUBAL LIGATION          OBJECTIVE:     VITAL SIGNS:  /71   Pulse 61   Temp 97.5 °F (36.4 °C) (Axillary)   Resp 16   Ht 5' 4\" (1.626 m)   Wt 185 lb 14.4 oz (84.3 kg)   SpO2 94%   BMI 31.91 kg/m²   Tmax over 24 hours:  Temp (24hrs), Av.5 °F (36.4 °C), Min:97.5 °F (36.4 °C), Max:97.5 °F (36.4 °C)      Patient Vitals for the past 8 hrs:   BP Pulse Resp SpO2   12/30/20 0812 139/71 61 -- --   12/30/20 0746 -- -- -- 94 %   12/30/20 0358 -- -- 16 98 %         Intake/Output Summary (Last 24 hours) at 12/30/2020 1032  Last data filed at 12/30/2020 0608  Gross per 24 hour   Intake --   Output 1250 ml   Net -1250 ml     Date 12/30/20 0000 - 12/30/20 2359   Shift 4759-2245 6167-7015 1148-5204 24 Hour Total   INTAKE   Shift Total(mL/kg)       OUTPUT   Urine(mL/kg/hr) 400(0.6)   400   Shift Total(mL/kg) 400(4.7)   400(4.7)   Weight (kg) 84.3 84.3 84.3 84.3     Wt Readings from Last 3 Encounters:   12/27/20 185 lb 14.4 oz (84.3 kg)   02/12/20 192 lb 3.2 oz (87.2 kg)   01/02/20 197 lb 3.2 oz (89.4 kg)     Body mass index is 31.91 kg/m². PHYSICAL EXAM:  I have discussed the care of Dm Torres  including pertinent history and exam findings,  with the nursing staff I have seen  the patient and the key elements of all parts of the encounter have been performed by me. For careful stewardship of limited PPE during COVID-19 pandemic my physical exam was deferred. Aga CircleUp        MEDICATIONS:  Scheduled Meds:   dexamethasone  6 mg Oral Daily    amLODIPine  10 mg Oral Daily    carvedilol  6.25 mg Oral BID    Vitamin D  1,000 Units Oral Daily    glipiZIDE  5 mg Oral BID AC    lisinopril  20 mg Oral Daily    metFORMIN  1,000 mg Oral Daily with breakfast    enoxaparin  40 mg Subcutaneous BID    insulin lispro  0-12 Units Subcutaneous TID WC    insulin lispro  0-6 Units Subcutaneous Nightly    insulin glargine  12 Units Subcutaneous Nightly     Continuous Infusions:   dextrose       PRN Meds:       albuterol sulfate HFA, 2 puff, Q6H PRN      benzonatate, 100 mg, TID PRN      potassium chloride, 40 mEq, PRN    Or      potassium alternative oral replacement, 40 mEq, PRN    Or      potassium chloride, 10 mEq, PRN      magnesium sulfate, 1 g, PRN      promethazine, 12.5 mg, Q6H PRN    Or      ondansetron, 4 mg, Q6H PRN     polyethylene glycol, 17 g, Daily PRN      nicotine, 1 patch, Daily PRN      acetaminophen, 650 mg, Q6H PRN    Or      acetaminophen, 650 mg, Q6H PRN      glucose, 15 g, PRN      dextrose, 12.5 g, PRN      glucagon (rDNA), 1 mg, PRN      dextrose, 100 mL/hr, PRN        SUPPORT DEVICES: [] Ventilator [] BIPAP  [x] high flow Nasal Cannula [] Room Air      ABGs:     No results found for: PHART, PH, PPD4QWV, PCO2, PO2ART, PO2, JZN8DGK, HCO3, BEART, BE, THGBART, THB, HPA5DEY, R6ZUDOJB, O2SAT, FIO2    DATA:  Complete Blood Count:   Recent Labs     12/28/20  0621 12/29/20  0403 12/30/20  0425   WBC 9.3 8.8 7.5   RBC 4.42 4.54 4.64   HGB 12.4 12.6 12.8   HCT 39.8 40.0 41.7   MCV 90.0 88.1 89.9   MCH 28.1 27.8 27.6   MCHC 31.2 31.5 30.7   RDW 15.4* 15.1* 15.4*    362 370   MPV 10.7 10.6 10.4        Last 3 Blood Glucose:   Recent Labs     12/28/20  0621 12/29/20  0403   GLUCOSE 185* 227*        PT/INR:    Lab Results   Component Value Date    PROTIME 11.2 12/27/2020    INR 1.1 12/27/2020     PTT:    Lab Results   Component Value Date    APTT 25.5 12/27/2020       Comprehensive Metabolic Profile:   Recent Labs     12/28/20  0621 12/29/20  0403    139   K 3.9 4.2    106   CO2 25 25   BUN 20 22   CREATININE 0.63 0.63   GLUCOSE 185* 227*   CALCIUM 8.3* 8.2*      Magnesium:   Lab Results   Component Value Date    MG 2.4 12/27/2020    MG 2.2 12/26/2020     Phosphorus:   Lab Results   Component Value Date    PHOS 2.3 12/27/2020    PHOS 2.2 12/26/2020    PHOS  12/26/2020     Unable to perform testing: Specimen quantity not sufficient.      Ionized Calcium: No results found for: CAION     Urinalysis: No results found for: Dotty Erm, PHUR, LABCAST, WBCUA, RBCUA, MUCUS, TRICHOMONAS, YEAST, BACTERIA, CLARITYU, SPECGRAV, LEUKOCYTESUR, UROBILINOGEN, BILIRUBINUR, BLOODU, GLUCOSEU, KETUA, AMORPHOUS            Xr Chest (single View Frontal)    Result Date: 12/29/2020  Scattered areas of ground-glass attenuation most pronounced in the lower lobes consistent with pneumonia. Xr Chest Portable    Result Date: 12/25/2020  No cardiomegaly or interstitial edema. Subtle non consolidating pneumonia appears to be developing in both lower lobes. VENTILATOR SETTINGS:  Vent Information  Skin Assessment: Clean, dry, & intact  FiO2 : (S) 55 %  SpO2: 94 %  SpO2/FiO2 ratio: 170.91     PaO2/FiO2 RATIO:  No results for input(s): POCPO2 in the last 72 hours. FiO2 : (S) 55 %       LABS:  ABGs:   No results for input(s): POCPH, POCPCO2, POCPO2, POCHCO3, AAMA9PCD in the last 72 hours. ASSESSMENT:     Principal Problem:    Pneumonia due to COVID-19 virus  Active Problems:    Type 2 diabetes mellitus with hyperglycemia, without long-term current use of insulin (HCC)    Essential hypertension    Acute hypoxemic respiratory failure (HCC)    Elevated C-reactive protein (CRP)  Resolved Problems:    * No resolved hospital problems. *              Acute hypoxic respiratory failure secondary to COVID 19   Acute respiratory distress syndrome   Bilateral multifocal pneumonia due to COVID 19 infection   Covid -19 pandemic emergency     LOS: 5  PLAN:    D/w RN     Airborne isolation and droplet precautions to be continued  Continue supportive care - complete treatment with dexamethasone . Remdesivir and plasma completed   keep sats >88-92 % , use bipap as needed . Will obtain xray chest and ABG as needed    WEAN PER PROTOCOL:  [] No   [] Yes  [x] N/A      ICU PROPHYLAXIS:  Stress ulcer:  [] PPI Agent  [] P0Vxpzu [] Sucralfate  [] Other:  VTE:   [] Enoxaparin  [] Unfract. Heparin Subcut  [] EPC Cuffs    NUTRITION:  [] NPO [] Tube Feeding (Specify: ) [] TPN  [x] PO        Treatment plan Discussed with nursing staff in detail , all questions answered .      Total critical care time caring for this patient with life threatening, unstable organ failure, including direct patient contact, management of life support systems, review of data including imaging and labs, discussions with other team members and physicians at least 27  Min so far today, excluding procedures. Electronically signed by James Strickland MD on 12/30/2020 at 10:32 AM       This patient was evaluated in the context of the global SARS-CoV-2 (COVID-19) pandemic, which necessitated considerations that the patient either has COVID-19 infection or is at risk of infection with COVID-19. Institutional protocols and algorithms that pertain to the evaluation & management of patients with COVID-19 or those at risk for COVID-19 are in a state of rapid changes based on information released by regulatory bodies including the CDC and federal and state organizations. These policies and algorithms were followed during the patient's care. Please note that this chart was generated using voice recognition Dragon dictation software. Although every effort was made to ensure the accuracy of this automated transcription, some errors in transcription may have occurred.

## 2020-12-30 NOTE — PROGRESS NOTES
Occupational Therapy  Facility/Department: [facility] CAR 1  Daily Treatment Note  NAME: Libra Malone  : 1940  MRN: [id number]    Date of Service: 2020    Discharge Recommendations:  Patient would benefit from continued therapy after discharge       Assessment   Performance deficits / Impairments: Decreased functional mobility ; Decreased ADL status; Decreased strength;Decreased high-level IADLs;Decreased balance  Assessment: Pt will benefit from continued OT services throughout hospitalization to increase safety, independence, and endurance for ADL and functional mobility performance  Treatment Diagnosis: COVID  Prognosis: Good  REQUIRES OT FOLLOW UP: Yes  Activity Tolerance  Activity Tolerance: Patient Tolerated treatment well  Safety Devices  Safety Devices in place: Yes  Type of devices: All fall risk precautions in place;Call light within reach; Patient at risk for falls; Left in chair;Nurse notified         Patient Diagnosis(es): The encounter diagnosis was COVID-19.      has a past medical history of Arthritis, Diabetes mellitus ([facility] [address] Hyperlipidemia, Hypertension, Hypokalemia with normal acid-base balance, Pneumonia due to COVID-19 virus, and Type 2 diabetes mellitus with hyperosmolarity not at goal [facility]). has a past surgical history that includes Hysterectomy; Hysterectomy, total abdominal; Tubal ligation; and eye surgery. Restrictions  Restrictions/Precautions  Restrictions/Precautions: Fall Risk, Isolation, Up as Tolerated  Required Braces or Orthoses?: No  Position Activity Restriction  Other position/activity restrictions: Up with assist, [place], High Flow O2  Subjective   General  Patient assessed for rehabilitation services?: Yes  Family / Caregiver Present: No  Pain Assessment  Response to Pain Intervention: Patient Satisfied  Vital Signs  Patient Currently in Pain: Denies      Objective    ADL  Feeding: Stand by assistance;Setup; Increased time to complete(able to open containers and feed task to increase safety and independence with ADLs/IADLs  Short term goal 3: Demo 10+ minutes dynamic standing task to increase safety and independence with ADLs/IADLs  Short term goal 4: Demo I with EC/WS techniques to incorporate throughout ADLs/IADLs  Short term goal 5: Demo I with safety awareness and pursed lip breathing throughout functional mobility and ADL tasks     Therapy Time   Individual Concurrent Group Co-treatment   Time In 1510         Time Out 1550         Minutes 40 total tx time                 BILLY SKY/DEA

## 2020-12-30 NOTE — PROGRESS NOTES
[facility]  Office: [address], DO, Juliana Roberts, DO, Toigina Solis, DO, Liana  Arron, DO, Rani Patton MD, Rodolfo Rios MD, Kareen Melton MD, Meenu Avila MD, Will Denton MD, Iesha Renee MD, Stanislav Kee MD, Ginger Powers MD, Christian Rebolledo MD, Ervin Trent DO, Savannah Contreras MD, Shiraz Chavez MD, Nikki Olguin DO, Rosi Mi MD,  Yvonne Cardona DO, Simon Gilman MD, Deisy Chapin MD, Carlos Andres, [facility], [facility] YODIT Gross, CNP, Clarissa Bui, CNP, Angela Diaz, CNS, Cindy Cox, CNP, Yoel Holloway, CNP, Garrick Dewitt, CNP, Shahzad Edwards, CNP, Conner Ayala, CNP, JORDI KnutsonC, Kenton Santiago, [facility], Pat Inman, CNP, Albert Motta, CNP, Emma Muse, CNP, Autumn Muñoz, CNP, Cristel Yao, [address] [place] [address]    Progress Note    12/30/2020    11:22 AM    Name:   Carmen Peterson  MRN:     [id number]     Acct:      [de-identified]   Room:   [address] [place] [address] Day:  5  Admit Date:  12/25/2020  6:49 AM    PCP:   Criselda Baker MD  Code Status:  Full Code    Subjective:     C/C:   Chief Complaint   Patient presents with    Chest Pain    Cough     Interval History Status: worsened. Patient now on high flow nasal cannula tolerating well, oxygen requirements are 45% with flow. If this continues to improve may be transitioned to nasal cannula. Brief History: This is an 73-year-old Afro-American female presents with a complaint of chest pressure, cough and increasing fatigue.  Her symptoms started this past Monday and have progressively worsened throughout the week.  She presented here and was found to have evidence of COVID-19 pneumonia is been admitted and started on Decadron, remdesivir and received 1 dose of convalescent plasma.     Review of Systems:     Constitutional:  negative for chills, fevers, sweats  Respiratory:  negative for cough, dyspnea on exertion, shortness of breath, wheezing  Cardiovascular:  negative for chest pain, chest pressure/discomfort, lower extremity edema, palpitations  Gastrointestinal:  negative for abdominal pain, constipation, diarrhea, nausea, vomiting  Neurological:  negative for dizziness, headache    Medications: Allergies: Allergies   Allergen Reactions    Atorvastatin      Severe myopathy    Codeine        Current Meds:   Scheduled Meds:    dexamethasone  6 mg Oral Daily    amLODIPine  10 mg Oral Daily    carvedilol  6.25 mg Oral BID    Vitamin D  1,000 Units Oral Daily    glipiZIDE  5 mg Oral BID AC    lisinopril  20 mg Oral Daily    metFORMIN  1,000 mg Oral Daily with breakfast    enoxaparin  40 mg Subcutaneous BID    insulin lispro  0-12 Units Subcutaneous TID WC    insulin lispro  0-6 Units Subcutaneous Nightly    insulin glargine  12 Units Subcutaneous Nightly     Continuous Infusions:    dextrose       PRN Meds: albuterol sulfate HFA, benzonatate, potassium chloride **OR** potassium alternative oral replacement **OR** potassium chloride, magnesium sulfate, promethazine **OR** ondansetron, polyethylene glycol, nicotine, acetaminophen **OR** acetaminophen, glucose, dextrose, glucagon (rDNA), dextrose    Data:     Past Medical History:   has a past medical history of Arthritis, Diabetes mellitus ([facility] [address] Hyperlipidemia, Hypertension, Hypokalemia with normal acid-base balance, Pneumonia due to COVID-19 virus, and Type 2 diabetes mellitus with hyperosmolarity not at goal [facility]). Social History:   reports that she has never smoked. She has never used smokeless tobacco. She reports that she does not drink alcohol or use drugs. Family History: History reviewed. No pertinent family history.     Vitals:  /71   Pulse 61   Temp 97.5 °F (36.4 °C) (Axillary)   Resp 16   Ht 5' 4\" (1.626 m)   Wt 185 lb 14.4 oz (84.3 kg)   SpO2 94%   BMI 31.91 kg/m²   Temp (24hrs), Av.5 °F (36.4 °C), Min:97.5 °F (36.4 °C), Max:97.5 °F (36.4 °C)    Recent Labs     12/28/20  1300 12/28/20  1644 12/28/20 2022 12/29/20 2056   POCGLU 198* 286* 309* 291*       I/O (24Hr): Intake/Output Summary (Last 24 hours) at 12/30/2020 1122  Last data filed at 12/30/2020 0608  Gross per 24 hour   Intake --   Output 1250 ml   Net -1250 ml       Labs:  Hematology:  Recent Labs     12/28/20  0621 12/29/20  0403 12/30/20  0425   WBC 9.3 8.8 7.5   RBC 4.42 4.54 4.64   HGB 12.4 12.6 12.8   HCT 39.8 40.0 41.7   MCV 90.0 88.1 89.9   MCH 28.1 27.8 27.6   MCHC 31.2 31.5 30.7   RDW 15.4* 15.1* 15.4*    362 370   MPV 10.7 10.6 10.4   CRP 11.1*  --   --      Chemistry:  Recent Labs     12/28/20  0621 12/29/20 0403 12/29/20  1035    139  --    K 3.9 4.2  --     106  --    CO2 25 25  --    GLUCOSE 185* 227*  --    BUN 20 22  --    CREATININE 0.63 0.63  --    ANIONGAP 10 8*  --    LABGLOM >60 >60  --    GFRAA >60 >60  --    CALCIUM 8.3* 8.2*  --    PROBNP  --   --  63     Recent Labs     12/27/20 2053 12/28/20  0848 12/28/20  1300 12/28/20  1644 12/28/20 2022 12/29/20 2056   POCGLU 247* 175* 198* 286* 309* 291*     ABG:No results found for: POCPH, PHART, PH, POCPCO2, RFC4UKI, PCO2, POCPO2, PO2ART, PO2, POCHCO3, YSQ4JTI, HCO3, NBEA, PBEA, BEART, BE, THGBART, THB, CLX3MTL, MFHP5RUW, D8RVLDSX, O2SAT, FIO2  Lab Results   Component Value Date/Time    SPECIAL NOT REPORTED 05/26/2019 08:34 AM     No results found for: CULTURE    Radiology:  Xr Chest Portable    Result Date: 12/25/2020  No cardiomegaly or interstitial edema. Subtle non consolidating pneumonia appears to be developing in both lower lobes.        Physical Examination:        General appearance:  alert, cooperative and no distress  Mental Status:  oriented to person, place and time and normal affect  Lungs:  clear to auscultation bilaterally, normal effort, diminished throughout  Heart:  regular rate and rhythm, no murmur  Abdomen:  soft, nontender, nondistended, normal bowel sounds, no masses, hepatomegaly, splenomegaly  Extremities:  no edema, redness, tenderness in the calves  Skin:  no gross lesions, rashes, induration    Assessment:        Hospital Problems           Last Modified POA    * (Principal) Pneumonia due to COVID-19 virus 12/25/2020 Yes    Type 2 diabetes mellitus with hyperglycemia, without long-term current use of insulin ([facility] [place] [facility] 12/25/2020 Yes    Essential hypertension 12/25/2020 Yes    Acute hypoxemic respiratory failure ([facility] [place] [facility] 12/25/2020 Yes    Elevated C-reactive protein (CRP) 12/25/2020 Yes          Plan:        1. Oxygen requirement still increased, no further treatment for Covid  2. Pulmonary consult   3. High flow nasal cannula during daytime, BiPAP at night  4. Tolerating HFNC, wean as tolerated.    5. Discharge planning    Ron Taylor DO  12/30/2020  11:22 AM

## 2020-12-31 LAB
ABSOLUTE EOS #: <0.03 K/UL (ref 0–0.44)
ABSOLUTE IMMATURE GRANULOCYTE: 0.13 K/UL (ref 0–0.3)
ABSOLUTE LYMPH #: 1.67 K/UL (ref 1.1–3.7)
ABSOLUTE MONO #: 0.43 K/UL (ref 0.1–1.2)
BASOPHILS # BLD: 0 % (ref 0–2)
BASOPHILS ABSOLUTE: 0.03 K/UL (ref 0–0.2)
DIFFERENTIAL TYPE: ABNORMAL
EOSINOPHILS RELATIVE PERCENT: 0 % (ref 1–4)
GLUCOSE BLD-MCNC: 272 MG/DL (ref 65–105)
GLUCOSE BLD-MCNC: 334 MG/DL (ref 65–105)
HCT VFR BLD CALC: 42 % (ref 36.3–47.1)
HEMOGLOBIN: 12.6 G/DL (ref 11.9–15.1)
IMMATURE GRANULOCYTES: 2 %
LYMPHOCYTES # BLD: 19 % (ref 24–43)
MCH RBC QN AUTO: 27.6 PG (ref 25.2–33.5)
MCHC RBC AUTO-ENTMCNC: 30 G/DL (ref 28.4–34.8)
MCV RBC AUTO: 91.9 FL (ref 82.6–102.9)
MONOCYTES # BLD: 5 % (ref 3–12)
NRBC AUTOMATED: 0.3 PER 100 WBC
PDW BLD-RTO: 15.7 % (ref 11.8–14.4)
PLATELET # BLD: 327 K/UL (ref 138–453)
PLATELET ESTIMATE: ABNORMAL
PMV BLD AUTO: 10.6 FL (ref 8.1–13.5)
RBC # BLD: 4.57 M/UL (ref 3.95–5.11)
RBC # BLD: ABNORMAL 10*6/UL
SEG NEUTROPHILS: 74 % (ref 36–65)
SEGMENTED NEUTROPHILS ABSOLUTE COUNT: 6.46 K/UL (ref 1.5–8.1)
WBC # BLD: 8.7 K/UL (ref 3.5–11.3)
WBC # BLD: ABNORMAL 10*3/UL

## 2020-12-31 PROCEDURE — 85025 COMPLETE CBC W/AUTO DIFF WBC: CPT

## 2020-12-31 PROCEDURE — 6370000000 HC RX 637 (ALT 250 FOR IP): Performed by: INTERNAL MEDICINE

## 2020-12-31 PROCEDURE — 6360000002 HC RX W HCPCS: Performed by: INTERNAL MEDICINE

## 2020-12-31 PROCEDURE — 99232 SBSQ HOSP IP/OBS MODERATE 35: CPT | Performed by: INTERNAL MEDICINE

## 2020-12-31 PROCEDURE — 2060000000 HC ICU INTERMEDIATE R&B

## 2020-12-31 PROCEDURE — 99291 CRITICAL CARE FIRST HOUR: CPT | Performed by: INTERNAL MEDICINE

## 2020-12-31 PROCEDURE — 97535 SELF CARE MNGMENT TRAINING: CPT

## 2020-12-31 PROCEDURE — 36415 COLL VENOUS BLD VENIPUNCTURE: CPT

## 2020-12-31 PROCEDURE — 97110 THERAPEUTIC EXERCISES: CPT

## 2020-12-31 PROCEDURE — 82947 ASSAY GLUCOSE BLOOD QUANT: CPT

## 2020-12-31 PROCEDURE — 94761 N-INVAS EAR/PLS OXIMETRY MLT: CPT

## 2020-12-31 PROCEDURE — 2700000000 HC OXYGEN THERAPY PER DAY

## 2020-12-31 PROCEDURE — 97530 THERAPEUTIC ACTIVITIES: CPT

## 2020-12-31 RX ADMIN — ENOXAPARIN SODIUM 40 MG: 40 INJECTION SUBCUTANEOUS at 20:55

## 2020-12-31 RX ADMIN — DEXAMETHASONE 6 MG: 4 TABLET ORAL at 08:35

## 2020-12-31 RX ADMIN — LISINOPRIL 20 MG: 20 TABLET ORAL at 08:34

## 2020-12-31 RX ADMIN — INSULIN GLARGINE 12 UNITS: 100 INJECTION, SOLUTION SUBCUTANEOUS at 22:40

## 2020-12-31 RX ADMIN — AMLODIPINE BESYLATE 10 MG: 10 TABLET ORAL at 08:35

## 2020-12-31 RX ADMIN — INSULIN LISPRO 2 UNITS: 100 INJECTION, SOLUTION INTRAVENOUS; SUBCUTANEOUS at 08:37

## 2020-12-31 RX ADMIN — METFORMIN HYDROCHLORIDE 1000 MG: 500 TABLET ORAL at 08:34

## 2020-12-31 RX ADMIN — ENOXAPARIN SODIUM 40 MG: 40 INJECTION SUBCUTANEOUS at 08:35

## 2020-12-31 RX ADMIN — CARVEDILOL 6.25 MG: 6.25 TABLET, FILM COATED ORAL at 20:55

## 2020-12-31 RX ADMIN — GLIPIZIDE 5 MG: 5 TABLET ORAL at 08:34

## 2020-12-31 RX ADMIN — INSULIN LISPRO 4 UNITS: 100 INJECTION, SOLUTION INTRAVENOUS; SUBCUTANEOUS at 12:48

## 2020-12-31 RX ADMIN — INSULIN LISPRO 8 UNITS: 100 INJECTION, SOLUTION INTRAVENOUS; SUBCUTANEOUS at 16:51

## 2020-12-31 RX ADMIN — INSULIN LISPRO 4 UNITS: 100 INJECTION, SOLUTION INTRAVENOUS; SUBCUTANEOUS at 22:40

## 2020-12-31 RX ADMIN — Medication 1000 UNITS: at 08:34

## 2020-12-31 RX ADMIN — GLIPIZIDE 5 MG: 5 TABLET ORAL at 16:50

## 2020-12-31 ASSESSMENT — PAIN SCALES - GENERAL: PAINLEVEL_OUTOF10: 0

## 2020-12-31 NOTE — PROGRESS NOTES
[facility] - Progress Note COVID 19 Patient  Today's Date and Time: 12/31/2020, 11:36 AM    Impression :   · COVID 19 Suspect  · COVID 19 Confirmed Infection-12/25/2020  ? Covid tests:Positive:  12/25/2020  · Elevated inflammatory markers   · Productive cough  · Acute Hypoxic respiratory failure: On high flow     Recommendations:   · Antibiotic Rx:  ? Monitor off antibiotics  · Covid Rx:  ? Completed Remdesivir- therapy started 12/25/2020. Stop date 12-29-20  ? Completed Convalescent Plasma-1 unit given 12/25/2020  ? Decadron 6 mg daily x10 days started 12/25/2020. Stop date 1-4-21     Medical Decision Making/Summary/Discussion:.12/31/2020      · Patient admitted with suspected COVID 19 infection  · Covid test confirmed positive. 12/25/2020  · On Rx with remdesivir, plasma and decadron  Infection Control Recommendations   · Universal Precautions  · Airborne isolation  · Droplet Isolation     Antimicrobial Stewardship Recommendations      No antimicrobial therapy needed at this time  Coordination of Outpatient Care:   · Estimated Length of IV antimicrobials:TBD  · Patient will need Midline Catheter Insertion: TBD  · Patient will need PICC line Insertion: No  · Patient will need: Home IV , [place],  SNF,  LTAC:TBD  · Patient will need outpatient wound care: No     Chief complaint/reason for consultation:   · Concern for COVID infection        History of Present Illness:12/31/2020   Daja Mckeon is a 78y.o.-year-old -American female with a past medical history of diabetes mellitus type 2, hypertension, hyperlipidemia, and arthritis who was initially admitted on 12/25/2020. Patient seen at the request of Dr. Tete Ramirez.     INITIAL HISTORY:     Patient presented through ER with complaints of fatigue, myalgias and chills x4 days, with newer onset cough with yellow sputum production and pleuritic chest pain x36 hours.   She denied fever, diarrhea, vomiting, or abdominal pain.     Patient received 1 unit of plasma, as well as remdesivir and Decadron therapy 12/25/2020. She was admitted to [facility] ICU placed on 6 L nasal cannula. Patient admitted because of concerns with COVID 19.     CURRENT EVALUATION : 12/31/2020     Patient still having dyspnea. Requiring hi-flow NC     Overall feels better except for hypoxia,  Doing well, up to commode without major desaturation    Plasma given 12-25-20  Remdesevir therapy ended 12/29/20  Decadron continues until 1-4-21    Afebrile  VS stable    Patient exhibiting respiratory distress. Yes  Respiratory secretions: No     Patient receiving supplemental oxygen. 6 L NC-->Facial mask 10 L/min>hi flow  02 sat:93%  RR: 16-->18     FiO2:  55-->45 %  Flow rate: 30-->25 L     NEWS Score: 0-4 Low risk group; 5-6: Medium risk group; 7 or above: High risk group  Parameters 3 2 1 0 1 2 3   Age       < 65     ? 65   RR ? 8   9-11 12-20   21-24 ? 25   O2 Sats ? 91 92-93 94-95 ? 96         Suppl O2   Yes   No         SBP ? 90  101-110 111-219     ? 220   HR ? 40   41-50 51-90  111-130 ? 131   Consciousness       Alert     Drowsiness, lethargy, or confusion   Temperature ? 35.0 C (95.0 F)   35.1-36.0 C 95.1-96.9 F 36.1-38.0 C 97.0-100.4 F 38.1-39.0 C 100.5-102.3 F ? 39.1 C ? 102.4 F        NEWS Score: 9  · High risk     Overall Daily Picture:   · Unchanged     Presence of secondary bacterial Infection:  No   Additional antibiotics: No     Labs, X rays reviewed: 12/31/2020       BUN: 19-->20-->22  Cr: 0.74-->0.63     WBC: 9.3-->8.8>7.5-->8.7  Hb: 13.2-->12.6-->12.6  Plat: 335-->362>370-->327     Absolute Neutrophils: 6.39  Absolute Lymphocytes: 1.33  Neutrophil/Lymphocyte Ratio: 4.8     CRP: 76 .7  Ferritin: 152  LDH: 389     Pro Calcitonin: 0.08        Cultures:  Urine:  ·    Blood:  ·    Sputum :  ·    Wound:  ·       CXR:   12/29/20  Scattered areas of ground-glass attenuation most pronounced in the lower   lobes consistent with pneumonia. · 12-25-20:Subtle non consolidating pneumonia appears to be developing in both lower lobes                Discussed with patient, RN, CC, IM.     I have personally reviewed the past medical history, past surgical history, medications, social history, and family history, and I have updated the database accordingly.   Past Medical History:      Past Medical History        Past Medical History:   Diagnosis Date    Arthritis      Diabetes mellitus ([facility] [address]      Hyperlipidemia 11/9/2016    Hypertension      Hypokalemia with normal acid-base balance 9/13/2016    Pneumonia due to COVID-19 virus 12/25/2020    Type 2 diabetes mellitus with hyperosmolarity not at goal [facility]) 9/13/2016            Past Surgical  History:      Past Surgical History         Past Surgical History:   Procedure Laterality Date    EYE SURGERY        HYSTERECTOMY        HYSTERECTOMY, TOTAL ABDOMINAL        TUBAL LIGATION                Medications:      Scheduled Medications    dexamethasone  6 mg Oral Daily    amLODIPine  10 mg Oral Daily    carvedilol  6.25 mg Oral BID    Vitamin D  1,000 Units Oral Daily    glipiZIDE  5 mg Oral BID AC    lisinopril  20 mg Oral Daily    metFORMIN  1,000 mg Oral Daily with breakfast    enoxaparin  40 mg Subcutaneous BID    insulin lispro  0-12 Units Subcutaneous TID WC    insulin lispro  0-6 Units Subcutaneous Nightly    insulin glargine  12 Units Subcutaneous Nightly    remdesivir IVPB  100 mg Intravenous Q24H            Social History:      Social History               Socioeconomic History    Marital status:        Spouse name: Not on file    Number of children: Not on file    Years of education: Not on file    Highest education level: Not on file   Occupational History    Not on file   Social Needs    Financial resource strain: Not on file    Food insecurity       Worry: Not on file       Inability: Not on file    Transportation needs       Medical: Not on file       polyuria, no polydipsia, no polyphagia.     Physical Examination :12/31/2020       Patient Vitals for the past 8 hrs:   BP Temp Temp src Pulse Resp SpO2   12/31/20 0913 -- -- -- 58 -- --   12/31/20 0834 125/76 97.8 °F (36.6 °C) Oral 62 -- --   12/31/20 0830 125/76 -- -- 62 -- 93 %   12/31/20 0805 -- -- -- -- 18 95 %   12/31/20 0345 (!) 148/67 98.1 °F (36.7 °C) Oral 70 20 95 %     General Appearance: Awake, alert, and in no apparent distress  Head:  Normocephalic, no trauma  Eyes: Pupils equal, round, reactive to light; sclera anicteric; conjunctivae pink. No embolic phenomena. ENT: Oropharynx clear, without erythema, exudate, or thrush. No tenderness of sinuses. Mouth/throat: mucosa pink and moist. No lesions. Dentition in good repair. Neck:Supple, without lymphadenopathy. Thyroid normal, No bruits. Pulmonary/Chest: Clear to auscultation, without wheezes, rales, or rhonchi. No dullness to percussion. Cardiovascular: Regular rate and rhythm without murmurs, rubs, or gallops. Abdomen: Soft, non tender. Bowel sounds normal. No organomegaly  All four Extremities: No cyanosis, clubbing, edema, or effusions. Neurologic: No gross sensory or motor deficits. Skin: Warm and dry with good turgor. No signs of peripheral arterial or venous insufficiency. No ulcerations. No open wounds. Medical Decision Making -Laboratory:   I have independently reviewed/ordered the following labs:    CBC with Differential:   Recent Labs     12/30/20  0425 12/31/20  0344   WBC 7.5 8.7   HGB 12.8 12.6   HCT 41.7 42.0    327   LYMPHOPCT 20* 19*   MONOPCT 5 5     BMP:   Recent Labs     12/29/20  0403      K 4.2      CO2 25   BUN 22   CREATININE 0.63     Hepatic Function Panel: No results for input(s): PROT, LABALBU, BILIDIR, IBILI, BILITOT, ALKPHOS, ALT, AST in the last 72 hours. No results for input(s): RPR in the last 72 hours. No results for input(s): HIV in the last 72 hours.   No results for input(s): BC in the last 72 hours. Lab Results   Component Value Date    RBC 4.57 12/31/2020    WBC 8.7 12/31/2020     Lab Results   Component Value Date    CREATININE 0.63 12/29/2020    GLUCOSE 227 12/29/2020       Medical Decision Making-Imaging:     EXAMINATION:   ONE XRAY VIEW OF THE CHEST       12/29/2020 10:46 am       COMPARISON:   Chest radiograph performed 12/25/2020.       HISTORY:   ORDERING SYSTEM PROVIDED HISTORY: COVID, hypoxia   TECHNOLOGIST PROVIDED HISTORY:   COVID, hypoxia   Reason for Exam: upr   Acuity: Unknown   Type of Exam: Unknown       FINDINGS:   There are scattered areas of ground-glass attenuation most pronounced in the   lower lobes.  There is no effusion. Jaya Motts is no pneumothorax.  The   mediastinal structures are unremarkable.  The upper abdomen is unremarkable. The extrathoracic soft tissues are unremarkable.           Impression   Scattered areas of ground-glass attenuation most pronounced in the lower   lobes consistent with pneumonia.             Medical Decision [id number]:       Medical Decision Making-Other:     Note:  · Labs, medications, radiologic studies were reviewed with personal review of films  · Moderate Large amounts of data were reviewed  · Discussed with nursing Staff, Discharge planner  · Infection Control and Prevention measures reviewed  · All prior entries were reviewed  · Administer medications as ordered  · Prognosis: Guarded  · Discharge planning reviewed  · Follow up as outpatient. Thank you for allowing us to participate in the care of this patient. Please call with questions.     Tyler Shay MD           Pager: [phone] - Office: [phone]

## 2020-12-31 NOTE — PLAN OF CARE
Problem: Airway Clearance - Ineffective  Goal: Achieve or maintain patent airway  12/31/2020 0529 by Sofia Liao RN  Outcome: Ongoing  12/30/2020 1838 by Luis Felipe Blandon RN  Outcome: Met This Shift     Problem: Gas Exchange - Impaired  Goal: Absence of hypoxia  12/31/2020 0529 by Sofia Liao RN  Outcome: Ongoing  12/30/2020 1838 by Luis Felipe Blandon RN  Outcome: Met This Shift  Goal: Promote optimal lung function  12/31/2020 0529 by Sofia Liao RN  Outcome: Ongoing  12/30/2020 1838 by Luis Felipe Blandon RN  Outcome: Met This Shift     Problem: Breathing Pattern - Ineffective  Goal: Ability to achieve and maintain a regular respiratory rate  12/31/2020 0529 by Sofia Liao RN  Outcome: Ongoing  12/30/2020 1838 by Luis Felipe Blandon RN  Outcome: Met This Shift     Problem:  Body Temperature -  Risk of, Imbalanced  Goal: Ability to maintain a body temperature within defined limits  12/31/2020 0529 by Sofia Liao RN  Outcome: Ongoing  12/30/2020 1838 by Luis Felipe Blandon RN  Outcome: Met This Shift  Goal: Will regain or maintain usual level of consciousness  12/31/2020 0529 by Sofia Liao RN  Outcome: Ongoing  12/30/2020 1838 by Luis Felipe Blandon RN  Outcome: Met This Shift  Goal: Complications related to the disease process, condition or treatment will be avoided or minimized  12/31/2020 0529 by Sofia Liao RN  Outcome: Ongoing  12/30/2020 1838 by Luis Felipe Blandon RN  Outcome: Met This Shift     Problem: Isolation Precautions - Risk of Spread of Infection  Goal: Prevent transmission of infection  12/31/2020 0529 by Sofia Liao RN  Outcome: Ongoing  12/30/2020 1838 by Luis Felipe Blandon RN  Outcome: Met This Shift     Problem: Nutrition Deficits  Goal: Optimize nutrtional status  12/31/2020 0529 by Sofia Liao RN  Outcome: Ongoing  12/30/2020 1838 by Luis Felipe Blandon RN  Outcome: Met This Shift     Problem: Risk for Fluid Volume Deficit  Goal: Maintain normal heart rhythm  12/31/2020 0529 by Ana Barrow RN  Outcome: Ongoing  12/30/2020 1838 by Merly Gonzalez RN  Outcome: Met This Shift  Goal: Maintain absence of muscle cramping  12/31/2020 0529 by nAa Barrow RN  Outcome: Ongoing  12/30/2020 1838 by Merly Gonzalez RN  Outcome: Met This Shift  Goal: Maintain normal serum potassium, sodium, calcium, phosphorus, and pH  12/31/2020 0529 by Ana Barrow RN  Outcome: Ongoing  12/30/2020 1838 by Merly Gonzalez RN  Outcome: Met This Shift     Problem: Loneliness or Risk for Loneliness  Goal: Demonstrate positive use of time alone when socialization is not possible  12/31/2020 0529 by Ana Barrow RN  Outcome: Ongoing  12/30/2020 1838 by Merly Gonzalez RN  Outcome: Met This Shift     Problem: Fatigue  Goal: Verbalize increase energy and improved vitality  12/31/2020 0529 by Ana Barrow RN  Outcome: Ongoing  12/30/2020 1838 by Merly Gonzalez RN  Outcome: Met This Shift     Problem: Patient Education: Go to Patient Education Activity  Goal: Patient/Family Education  12/31/2020 0529 by Ana Barrow RN  Outcome: Ongoing  12/30/2020 1838 by Merly Gonzalez RN  Outcome: Met This Shift     Problem: Pain:  Goal: Pain level will decrease  Description: Pain level will decrease  12/31/2020 0529 by Ana Barrow RN  Outcome: Ongoing  12/30/2020 1838 by Merly Gonzalez RN  Outcome: Met This Shift  Goal: Control of acute pain  Description: Control of acute pain  12/31/2020 0529 by Ana Barrow RN  Outcome: Ongoing  12/30/2020 1838 by Merly Gonzalez RN  Outcome: Met This Shift  Goal: Control of chronic pain  Description: Control of chronic pain  12/31/2020 0529 by Ana Barrow RN  Outcome: Ongoing  12/30/2020 1838 by Merly Gonzalez RN  Outcome: Met This Shift     Problem: Falls - Risk of:  Goal: Will remain free from falls  Description: Will remain free from falls  12/31/2020 0529 by Aan Barrow RN  Outcome: Ongoing  12/30/2020 1838 by Merly Gonzalez RN  Outcome: Met This Shift  Goal: Absence of physical injury  Description: Absence of physical injury  12/31/2020 0529 by Cecy Chavira RN  Outcome: Ongoing  12/30/2020 1838 by Anna Villa RN  Outcome: Met This Shift

## 2020-12-31 NOTE — PROGRESS NOTES
[facility]  Office: [address], DO, Melissa Steele, DO, Arturo Calvert, DO, Ly Bishopshelli Heller, DO, Veena Garcia MD, Justina Sweeney MD, Emiliano Lu MD, Irene Hung MD, Alexandria Yun MD, Alex Pappas MD, Flora Villanueva MD, Nyla Briggs MD, Christian Mohamud MD, Maddie Blanco DO, Lenka Mckinley MD, Nahomi Brooks MD, Bonifacio Smith DO, Liana Leach MD,  Jigna Cruz DO, Gricel Medina MD, Mumtaz Briggs MD, Shubham Chapin CNP, [facility] Ginny, CNP, Daina Ren, CNP, Gardenia Ya, CNS, Titi Arzola, CNP, Jennifer Houston, CNP, Jorge Mike, CNP, Gloria Palacios, CNP, Vladimir Hyatt, CNP, Adolph Don PA-C, Jimmy Ag, SATNAM, Esther Fontaine, CNP, Wild Young, CNP, Ally Santos, CNP, Esperanza Quigley, CNP, Vianney Ruelas, CNP         [address]    Progress Note    12/31/2020    6:31 PM    Name:   Indira Virgen  MRN:     [id number]     Acct:      [de-identified]   Room:   [address] [place] [address]-[facility] Day:  6  Admit Date:  12/25/2020  6:49 AM    PCP:   Francine Sibley MD  Code Status:  Full Code    Subjective:     C/C:   Chief Complaint   Patient presents with    Chest Pain    Cough     Interval History Status: worsened. Patient is oxygen requirements are decreasing at this time,  she is able to ambulate around the room without difficulty, she lives alone however and still requiring FiO2 of 45%. Discussed possible discharge home if oxygen requirements continue to decrease tomorrow. Patient with no complaints otherwise, no events overnight    Brief History:      This is an 52-year-old Afro-American female presents with a complaint of chest pressure, cough and increasing fatigue.  Her symptoms started this past Monday and have progressively worsened throughout the week.  She presented here and was found to have evidence of COVID-19 pneumonia is been admitted and started on Decadron, remdesivir and received 1 dose of convalescent plasma. Review of Systems:     Constitutional:  negative for chills, fevers, sweats  Respiratory:  negative for cough, dyspnea on exertion, shortness of breath, wheezing  Cardiovascular:  negative for chest pain, chest pressure/discomfort, lower extremity edema, palpitations  Gastrointestinal:  negative for abdominal pain, constipation, diarrhea, nausea, vomiting  Neurological:  negative for dizziness, headache    Medications: Allergies: Allergies   Allergen Reactions    Atorvastatin      Severe myopathy    Codeine        Current Meds:   Scheduled Meds:    dexamethasone  6 mg Oral Daily    amLODIPine  10 mg Oral Daily    carvedilol  6.25 mg Oral BID    Vitamin D  1,000 Units Oral Daily    glipiZIDE  5 mg Oral BID AC    lisinopril  20 mg Oral Daily    metFORMIN  1,000 mg Oral Daily with breakfast    enoxaparin  40 mg Subcutaneous BID    insulin lispro  0-12 Units Subcutaneous TID WC    insulin lispro  0-6 Units Subcutaneous Nightly    insulin glargine  12 Units Subcutaneous Nightly     Continuous Infusions:    dextrose       PRN Meds: albuterol sulfate HFA, benzonatate, potassium chloride **OR** potassium alternative oral replacement **OR** potassium chloride, magnesium sulfate, promethazine **OR** ondansetron, polyethylene glycol, nicotine, acetaminophen **OR** acetaminophen, glucose, dextrose, glucagon (rDNA), dextrose    Data:     Past Medical History:   has a past medical history of Arthritis, Diabetes mellitus ([address] Hyperlipidemia, Hypertension, Hypokalemia with normal acid-base balance, Pneumonia due to COVID-19 virus, and Type 2 diabetes mellitus with hyperosmolarity not at goal [facility]). Social History:   reports that she has never smoked. She has never used smokeless tobacco. She reports that she does not drink alcohol or use drugs. Family History: History reviewed. No pertinent family history.     Vitals:  /66   Pulse 65   Temp 98.1 °F (36.7 °C) (Oral) Resp 18   Ht 5' 4\" (1.626 m)   Wt 185 lb 14.4 oz (84.3 kg)   SpO2 96%   BMI 31.91 kg/m²   Temp (24hrs), Av °F (36.7 °C), Min:97.8 °F (36.6 °C), Max:98.1 °F (36.7 °C)    Recent Labs     20  1309 20   POCGLU 175* 258* 272* 278*       I/O (24Hr): Intake/Output Summary (Last 24 hours) at 2020 1831  Last data filed at 2020 0640  Gross per 24 hour   Intake --   Output 800 ml   Net -800 ml       Labs:  Hematology:  Recent Labs     20  0403 12/30/20  0425 20  0344   WBC 8.8 7.5 8.7   RBC 4.54 4.64 4.57   HGB 12.6 12.8 12.6   HCT 40.0 41.7 42.0   MCV 88.1 89.9 91.9   MCH 27.8 27.6 27.6   MCHC 31.5 30.7 30.0   RDW 15.1* 15.4* 15.7*    370 327   MPV 10.6 10.4 10.6     Chemistry:  Recent Labs     20  0403 12/29/20  1035     --    K 4.2  --      --    CO2 25  --    GLUCOSE 227*  --    BUN 22  --    CREATININE 0.63  --    ANIONGAP 8*  --    LABGLOM >60  --    GFRAA >60  --    CALCIUM 8.2*  --    PROBNP  --  63     Recent Labs     20  0809 20  1309 20   POCGLU 282* 291* 175* 258* 272* 278*     ABG:No results found for: POCPH, PHART, PH, POCPCO2, MJV8MFC, PCO2, POCPO2, PO2ART, PO2, POCHCO3, BFM8XRE, HCO3, NBEA, PBEA, BEART, BE, THGBART, THB, WQV0HQH, CCEX4TUY, O8FYZABZ, O2SAT, FIO2  Lab Results   Component Value Date/Time    SPECIAL NOT REPORTED 2019 08:34 AM     No results found for: CULTURE    Radiology:  Xr Chest Portable    Result Date: 2020  No cardiomegaly or interstitial edema. Subtle non consolidating pneumonia appears to be developing in both lower lobes.        Physical Examination:        General appearance:  alert, cooperative and no distress  Mental Status:  oriented to person, place and time and normal affect  Lungs:  clear to auscultation bilaterally, normal effort, diminished throughout  Heart:  regular rate and rhythm, no murmur  Abdomen:  soft, nontender, nondistended, normal bowel sounds, no masses, hepatomegaly, splenomegaly  Extremities:  no edema, redness, tenderness in the calves  Skin:  no gross lesions, rashes, induration    Assessment:        Hospital Problems           Last Modified POA    * (Principal) Pneumonia due to COVID-19 virus 12/25/2020 Yes    Type 2 diabetes mellitus with hyperglycemia, without long-term current use of insulin ([facility] 12/25/2020 Yes    Essential hypertension 12/25/2020 Yes    Acute hypoxemic respiratory failure ([facility] 12/25/2020 Yes    Elevated C-reactive protein (CRP) 12/25/2020 Yes          Plan:        1. Oxygen requirement still increased, no further treatment for Covid  2. Pulmonary consult   3. High flow nasal cannula during daytime, BiPAP at night  4. Tolerating HFNC, wean as tolerated. 5. Discharge planning: Team to wean high flow nasal cannula, will attempt to place on simple nasal cannula now, if able to tolerate overnight and able to ambulate tomorrow without desaturation can be discharged home with home health care and home oxygen. Otherwise may require hospitalization till Monday pending placement to subacute rehab.     Chayo Vu,   12/31/2020  6:31 PM

## 2020-12-31 NOTE — PROGRESS NOTES
Medical: None     Non-medical: None   Tobacco Use    Smoking status: Never Smoker    Smokeless tobacco: Never Used   Substance and Sexual Activity    Alcohol use: No    Drug use: No    Sexual activity: None   Lifestyle    Physical activity     Days per week: None     Minutes per session: None    Stress: None   Relationships    Social connections     Talks on phone: None     Gets together: None     Attends Sikh service: None     Active member of club or organization: None     Attends meetings of clubs or organizations: None     Relationship status: None    Intimate partner violence     Fear of current or ex partner: None     Emotionally abused: None     Physically abused: None     Forced sexual activity: None   Other Topics Concern    None   Social History Narrative    None       Immunization History   Administered Date(s) Administered    Influenza A (H5N1) Monovalent Vaccine, Adjuvanted-2013 08/09/2019    Influenza Vaccine, unspecified formulation 01/01/2012, 10/07/2013, 09/04/2014, 09/01/2015, 09/26/2016    Influenza Virus Vaccine 09/01/2015, 10/07/2018    Influenza, High Dose (Fluzone 65 yrs and older) 09/14/2015, 09/26/2016, 08/14/2017    Influenza, Triv, inactivated, subunit, adjuvanted, IM (Fluad 65 yrs and older) 10/27/2018, 08/09/2019, 08/14/2020    Pneumococcal Conjugate 13-valent (Nery Quiet) 09/14/2015, 12/15/2017    Pneumococcal Polysaccharide ([id number]) 01/27/2006, 09/01/2015, 05/21/2018    Td, unspecified formulation 01/27/2006    Zoster Live (Zostavax) 01/01/2011         History reviewed. No pertinent family history.       Past Surgical History:   Procedure Laterality Date    EYE SURGERY      HYSTERECTOMY      HYSTERECTOMY, TOTAL ABDOMINAL      TUBAL LIGATION          OBJECTIVE:     VITAL SIGNS:  /66   Pulse 65   Temp 98.1 °F (36.7 °C) (Oral)   Resp 18   Ht 5' 4\" (1.626 m)   Wt 185 lb 14.4 oz (84.3 kg)   SpO2 96%   BMI 31.91 kg/m²   Tmax over 24 hours:  Temp (24hrs), Av °F (36.7 °C), Min:97.8 °F (36.6 °C), Max:98.1 °F (36.7 °C)      Patient Vitals for the past 8 hrs:   BP Temp Temp src Pulse Resp SpO2 Height   20 1430 -- -- -- -- 18 96 % --   20 1250 120/66 98.1 °F (36.7 °C) Oral 65 -- 96 % --   20 1207 -- -- -- -- -- -- 5' 4\" (1.626 m)         Intake/Output Summary (Last 24 hours) at 2020 1853  Last data filed at 2020 0640  Gross per 24 hour   Intake --   Output 800 ml   Net -800 ml     Date 20 0000 - 20 2359   Shift 1098-9913 2254-7341 6478-6441 24 Hour Total   INTAKE   Shift Total(mL/kg)       OUTPUT   Urine(mL/kg/hr) 800(1.2)   800   Shift Total(mL/kg) 800(9.5)   800(9.5)   Weight (kg) 84.3 84.3 84.3 84.3     Wt Readings from Last 3 Encounters:   20 185 lb 14.4 oz (84.3 kg)   20 192 lb 3.2 oz (87.2 kg)   20 197 lb 3.2 oz (89.4 kg)     Body mass index is 31.91 kg/m². PHYSICAL EXAM:  I have discussed the care of Love Thurman  including pertinent history and exam findings,  with the nursing staff I have seen  the patient and the key elements of all parts of the encounter have been performed by me. For careful stewardship of limited PPE during COVID-19 pandemic my physical exam was deferred. Ray Vincent        MEDICATIONS:  Scheduled Meds:   dexamethasone  6 mg Oral Daily    amLODIPine  10 mg Oral Daily    carvedilol  6.25 mg Oral BID    Vitamin D  1,000 Units Oral Daily    glipiZIDE  5 mg Oral BID AC    lisinopril  20 mg Oral Daily    metFORMIN  1,000 mg Oral Daily with breakfast    enoxaparin  40 mg Subcutaneous BID    insulin lispro  0-12 Units Subcutaneous TID WC    insulin lispro  0-6 Units Subcutaneous Nightly    insulin glargine  12 Units Subcutaneous Nightly     Continuous Infusions:   dextrose       PRN Meds:       albuterol sulfate HFA, 2 puff, Q6H PRN      benzonatate, 100 mg, TID PRN      potassium chloride, 40 mEq, PRN    Or      potassium alternative oral replacement, 40 mEq, PRN    Or      potassium chloride, 10 mEq, PRN      magnesium sulfate, 1 g, PRN      promethazine, 12.5 mg, Q6H PRN    Or      ondansetron, 4 mg, Q6H PRN      polyethylene glycol, 17 g, Daily PRN      nicotine, 1 patch, Daily PRN      acetaminophen, 650 mg, Q6H PRN    Or      acetaminophen, 650 mg, Q6H PRN      glucose, 15 g, PRN      dextrose, 12.5 g, PRN      glucagon (rDNA), 1 mg, PRN      dextrose, 100 mL/hr, PRN        SUPPORT DEVICES: [] Ventilator [] BIPAP  [x] high flow Nasal Cannula [] Room Air      ABGs:     No results found for: PHART, PH, [id number], PCO2, PO2ART, PO2, HCL1FEZ, HCO3, BEART, BE, THGBART, THB, LLH2KYJ, J4MTRXTS, O2SAT, FIO2    DATA:  Complete Blood Count:   Recent Labs     12/29/20  0403 12/30/20  0425 12/31/20  0344   WBC 8.8 7.5 8.7   RBC 4.54 4.64 4.57   HGB 12.6 12.8 12.6   HCT 40.0 41.7 42.0   MCV 88.1 89.9 91.9   MCH 27.8 27.6 27.6   MCHC 31.5 30.7 30.0   RDW 15.1* 15.4* 15.7*    370 327   MPV 10.6 10.4 10.6        Last 3 Blood Glucose:   Recent Labs     12/29/20  0403   GLUCOSE 227*        PT/INR:    Lab Results   Component Value Date    PROTIME 11.2 12/27/2020    INR 1.1 12/27/2020     PTT:    Lab Results   Component Value Date    APTT 25.5 12/27/2020       Comprehensive Metabolic Profile:   Recent Labs     12/29/20  0403      K 4.2      CO2 25   BUN 22   CREATININE 0.63   GLUCOSE 227*   CALCIUM 8.2*      Magnesium:   Lab Results   Component Value Date    MG 2.4 12/27/2020    MG 2.2 12/26/2020     Phosphorus:   Lab Results   Component Value Date    PHOS 2.3 12/27/2020    PHOS 2.2 12/26/2020    PHOS  12/26/2020     Unable to perform testing: Specimen quantity not sufficient.      Ionized Calcium: No results found for: CAION     Urinalysis: No results found for: Haider Zamudio, PHUR, LABCAST, WBCUA, RBCUA, MUCUS, TRICHOMONAS, YEAST, BACTERIA, CLARITYU, SPECGRAV, LEUKOCYTESUR, UROBILINOGEN, BILIRUBINUR, BLOODU, GLUCOSEU, KETUA, AMORPHOUS            Xr Chest (single View Frontal)    Result Date: 12/29/2020  Scattered areas of ground-glass attenuation most pronounced in the lower lobes consistent with pneumonia. Xr Chest Portable    Result Date: 12/25/2020  No cardiomegaly or interstitial edema. Subtle non consolidating pneumonia appears to be developing in both lower lobes. VENTILATOR SETTINGS:  Vent Information  Skin Assessment: Clean, dry, & intact  Equipment Changed: Humidification  FiO2 : 45 %  SpO2: 96 %  SpO2/FiO2 ratio: 213.33  Humidification Source: Heated wire  Humidification Temp: 33  Humidification Temp Measured: 33     PaO2/FiO2 RATIO:  No results for input(s): POCPO2 in the last 72 hours. FiO2 : 45 %       LABS:  ABGs:   No results for input(s): POCPH, POCPCO2, POCPO2, POCHCO3, HJRU1EUK in the last 72 hours. ASSESSMENT:     Principal Problem:    Pneumonia due to COVID-19 virus  Active Problems:    Type 2 diabetes mellitus with hyperglycemia, without long-term current use of insulin (HCC)    Essential hypertension    Acute hypoxemic respiratory failure (HCC)    Elevated C-reactive protein (CRP)  Resolved Problems:    * No resolved hospital problems. *              Acute hypoxic respiratory failure secondary to COVID 19   Acute respiratory distress syndrome   Bilateral multifocal pneumonia due to COVID 19 infection   Covid -19 pandemic emergency     LOS: 6  PLAN:    D/w RN   Continue to wean high flow NC   Keep sats >92 %     Airborne isolation and droplet precautions to be continued  Continue supportive care - complete treatment with dexamethasone . Remdesivir and plasma completed   Will obtain xray chest and ABG as needed    WEAN PER PROTOCOL:  [] No   [] Yes  [x] N/A      ICU PROPHYLAXIS:  Stress ulcer:  [] PPI Agent  [] I2Mtdhi [] Sucralfate  [] Other:  VTE:   [] Enoxaparin  [] Unfract.  Heparin Subcut  [] EPC Cuffs    NUTRITION:  [] NPO [] Tube Feeding (Specify: ) [] TPN  [x] PO        Treatment plan Discussed with nursing staff in detail , all questions answered . Total critical care time caring for this patient with life threatening, unstable organ failure, including direct patient contact, management of life support systems, review of data including imaging and labs, discussions with other team members and physicians at least 27  Min so far today, excluding procedures. Electronically signed by Ernie Ferguson MD on 12/31/2020 at 6:53 PM       This patient was evaluated in the context of the global SARS-CoV-2 (COVID-19) pandemic, which necessitated considerations that the patient either has COVID-19 infection or is at risk of infection with COVID-19. Institutional protocols and algorithms that pertain to the evaluation & management of patients with COVID-19 or those at risk for COVID-19 are in a state of rapid changes based on information released by regulatory bodies including the CDC and federal and state organizations. These policies and algorithms were followed during the patient's care. Please note that this chart was generated using voice recognition Dragon dictation software. Although every effort was made to ensure the accuracy of this automated transcription, some errors in transcription may have occurred.

## 2020-12-31 NOTE — PLAN OF CARE
Nutrition Problem #1: Inadequate protein intake  Intervention: Food and/or Nutrient Delivery: Continue Current Diet, Start Oral Nutrition Supplement  Nutritional Goals: Pt to meet % of est'd needs via PO

## 2020-12-31 NOTE — PROGRESS NOTES
Comprehensive Nutrition Assessment    Type and Reason for Visit:  Initial(LOS Day 6)    Nutrition Recommendations/Plan:   -Continue 4 CHO diabetic diet   -Recommend glucerna supplements BID   -Will monitor po intake and weight trends     Nutrition Assessment:   Pt admitted d/t Pneumonia 2/2 COVID-19 virus. Pt currently w/ therapy during time of visit. Per EMR, pt has been consuming % of her meals this week. No significant wt loss noted this week or in wt hx. Will add nutritional supplements d/t variable po intake and will monitor. Malnutrition Assessment:  Malnutrition Status:  Insufficient data    Context:  Acute Illness     Findings of the 6 clinical characteristics of malnutrition:  Energy Intake:  (variable po intake x 1 wk)  Weight Loss:  No significant weight loss     Body Fat Loss:  Unable to assess     Muscle Mass Loss:  Unable to assess    Fluid Accumulation:  No significant fluid accumulation     Strength:  Not Performed    Estimated Daily Nutrient Needs:  Energy (kcal):  1.2-1.3 ~> 2865-3898 kcals/d; Weight Used for Energy Requirements:  Admission     Protein (g):  1.2-1.3 gm/kg ~> 65-70 gms/d; Weight Used for Protein Requirements:  Ideal          Nutrition Related Findings:  glucose 278, Phos 2.3      Wounds:  None       Current Nutrition Therapies:    DIET GENERAL; Carb Control: 4 carb choices (60 gms)/meal    Anthropometric Measures:  · Height: 5' 4\" (162.6 cm)  · Current Body Weight: 185 lb (83.9 kg)   · Admission Body Weight: 183 lb (83 kg)    · Ideal Body Weight: 120 lbs; % Ideal Body Weight 154.2 %   · BMI: 31.7  · BMI Categories: Obese Class 1 (BMI 30.0-34. 9)       Nutrition Diagnosis:   · Inadequate protein intake related to cardiac dysfunction as evidenced by intake 26-50%, intake 51-75%(variable po intake, need for ONS)      Nutrition Interventions:   Food and/or Nutrient Delivery:  Continue Current Diet, Start Oral Nutrition Supplement  Nutrition Education/Counseling: Education not indicated   Coordination of Nutrition Care:  Continue to monitor while inpatient    Goals: Set   Pt to meet % of est'd needs via PO       Nutrition Monitoring and Evaluation:   Food/Nutrient Intake Outcomes:  Food and Nutrient Intake, Supplement Intake  Physical Signs/Symptoms Outcomes:  Biochemical Data, Nutrition Focused Physical Findings, Skin, Weight, GI Status, Fluid Status or Edema     Discharge Planning:     Too soon to determine     Electronically signed by Josse Garcia RD, LD on 12/31/20 at 12:14 PM EST    Contact: [phone]

## 2020-12-31 NOTE — PROGRESS NOTES
Occupational Therapy  Facility/Department: [facility] 1  Daily Treatment Note  NAME: Ariella Scott  : 1940  MRN: [id number]    Date of Service: 2020    Discharge Recommendations:  Patient would benefit from continued therapy after discharge in order to increase pt endurance and independence. Assessment   Performance deficits / Impairments: Decreased functional mobility ; Decreased ADL status; Decreased high-level IADLs;Decreased endurance  Prognosis: Good  OT Education: OT Role;Transfer Training  Patient Education: purpose of OT; proper hand and foot placement  Barriers to Learning: pt holli TRANG carry over  REQUIRES OT FOLLOW UP: Yes  Activity Tolerance  Activity Tolerance: Patient Tolerated treatment well  Safety Devices  Safety Devices in place: Yes  Type of devices: Patient at risk for falls; Left in chair;Call light within reach;Nurse notified         Patient Diagnosis(es): The encounter diagnosis was COVID-19.      has a past medical history of Arthritis, Diabetes mellitus ([facility] Hyperlipidemia, Hypertension, Hypokalemia with normal acid-base balance, Pneumonia due to COVID-19 virus, and Type 2 diabetes mellitus with hyperosmolarity not at goal [facility]). has a past surgical history that includes Hysterectomy; Hysterectomy, total abdominal; Tubal ligation; and eye surgery. Restrictions  Restrictions/Precautions  Restrictions/Precautions: Fall Risk, Isolation, Up as Tolerated(COVID)  Required Braces or Orthoses?: No  Position Activity Restriction  Other position/activity restrictions:  Up with assist, [place], High Flow O2  Subjective   General  Chart Reviewed: Yes  Patient assessed for rehabilitation services?: Yes  Family / Caregiver Present: No  General Comment  Comments: Pt pleasant, cooperative and motivated throughout session  Vital Signs  Patient Currently in Pain: Denies   Orientation  Orientation  Overall Orientation Status: Within Functional Limits  Objective    ADL  Grooming: Modified independent ;Setup(seated in chair for oral care)  UE Bathing: Supervision(completed standing)  LE Bathing: Supervision(completed seated/standing)  UE Dressing: Supervision(to doff/don gown and pull over shirt)  LE Dressing: Stand by assistance(to doff/don underwear, socks and pants)  Additional Comments: Pt seated in chair at start of session agreeable to bathing and grooming. Balance  Sitting Balance: Modified independent (seated in chair)  Standing Balance: Supervision  Standing Balance  Time: Pt tolerated approx 12-15 min  Activity: dynamic standing during ADLs  Comment: without AD, no LOB noted  Functional Mobility  Functional Mobility Comments: Mobility not completed pt attached to hi flow  Bed mobility  Comment: pt seated in chair at start of session and retired to chair at session end  Transfers  Sit to stand: Stand by assistance  Stand to sit: Stand by assistance  Transfer Comments: without AD  Cognition  Overall Cognitive Status: WFL     Pt and RN agreeable to therapy this day. ADL tasks of dressing, bathing and grooming completed see above for LOF. 0 LOB noted throughout session without AD. Pt limited per hi flow. At session end pt seated in chair with call light in reach and tray table over pt. RN notified.    Plan   Plan  Times per week: 3-5 x/wk  Current Treatment Recommendations: Balance Training, Functional Mobility Training, Endurance Training, Self-Care / ADL, Safety Education & Training   Cont POC  Goals  Short term goals  Time Frame for Short term goals: By discharge, pt will:  Short term goal 1: Demo I with setup for UB and LB ADLs and toileting tasks  Short term goal 2: Demo I for dynamic functional mobility task to increase safety and independence with ADLs/IADLs  Short term goal 3: Demo 10+ minutes dynamic standing task to increase safety and independence with ADLs/IADLs  Short term goal 4: Demo I with EC/WS techniques to incorporate throughout ADLs/IADLs  Short term goal 5: Demo I with

## 2020-12-31 NOTE — PROGRESS NOTES
Physical Therapy  Daily Treatment Note  NAME: Esteban Cooley  : 1940  MRN: [id number]    Date of Service: 2020    Discharge Recommendations:  Patient would benefit from continued therapy after discharge   PT Equipment Recommendations  Equipment Needed: No    Assessment   Body structures, Functions, Activity limitations: Decreased functional mobility ; Decreased balance;Decreased endurance  Assessment: Pt amb ~300ft with SBA and no assistive device. Limited by hi elieser, sats to low 90's with mobility. Pt is AMB well, but is still on HiFlo O2 and would benefit from further PT to improve her ednurance. Prognosis: Good  PT Education: Goals;PT Role;Plan of Care;General Safety;Pressure Relief; Injury Prevention;Home Exercise Program  REQUIRES PT FOLLOW UP: Yes  Activity Tolerance  Activity Tolerance: Patient Tolerated treatment well     Patient Diagnosis(es): The encounter diagnosis was COVID-19.     has a past medical history of Arthritis, Diabetes mellitus ([facility] [address] [facility] Hyperlipidemia, Hypertension, Hypokalemia with normal acid-base balance, Pneumonia due to COVID-19 virus, and Type 2 diabetes mellitus with hyperosmolarity not at goal [facility]). has a past surgical history that includes Hysterectomy; Hysterectomy, total abdominal; Tubal ligation; and eye surgery. Restrictions  Restrictions/Precautions  Restrictions/Precautions: Fall Risk, Isolation, Up as Tolerated(COVID)  Required Braces or Orthoses?: No  Position Activity Restriction  Other position/activity restrictions: Up with assist, [place], High Flow O2     Subjective   General  Chart Reviewed: Yes  Response To Previous Treatment: Patient with no complaints from previous session.   Subjective  Subjective: Pt resting in bedside chair upon arrival, agreeable to PT. pleasant and cooperative  General Comment  Comments: Hi elieser  Pain Screening  Patient Currently in Pain: Denies  Vital Signs  Patient Currently in Pain: Denies       Orientation  Orientation  Overall Orientation Status: Within Normal Limits    Objective      Transfers  Sit to Stand: Independent  Stand to sit:  Independent  Bed to Chair: Independent  Comment: no AD  Ambulation  Ambulation?: Yes  Ambulation 1  Surface: level tile  Device: No Device  Assistance: Independent  Quality of Gait: steady, no LOB  Gait Deviations: Slow Taylor  Distance: ~300 ft  Comments: Further amb limited by hi elieser, O2 down to 90% after  Stairs/Curb  Stairs?: No     Balance  Posture: Good  Sitting - Static: Good  Sitting - Dynamic: Good  Standing - Static: Good  Standing - Dynamic: Good  Comments: standing balance assessed without AD  Exercises  Straight Leg Raise: Standing BLE x10  Heelslides: BLE x10  Hip Flexion: Standing marching BLE x10  Hip Extension/Leg Presses: Standing  BLE x10  Hip Abduction: Standing BLE x10  Knee Long Arc Quad: BLE x10  Ankle Pumps: BLE x10  Upper Extremity: BUE x10       Goals  Short term goals  Time Frame for Short term goals: 14 visits  Short term goal 1: Perform bed mobility and functional transfers independently  Short term goal 2: Ambulate 300ft independently with SPO2 >90%  Short term goal 3: Ascend/descend 9 steps with HR and SBA  Short term goal 4: Demo Good dynamic standing balance to decrease risk of falls    Plan    Plan  Times per week: 3-5x/wk  Current Treatment Recommendations: Strengthening, ROM, Balance Training, Functional Mobility Training, Transfer Training, Gait Training, Stair training, Endurance Training, Home Exercise Program, Safety Education & Training, Patient/Caregiver Education & Training  Safety Devices  Type of devices: Nurse notified, Call light within reach, Gait belt, Left in bed, Left in chair  Restraints  Initially in place: No     Therapy Time   Individual Concurrent Group Co-treatment   Time In 1056         Time Out 1123         Minutes 27         Timed Code Treatment Minutes: [address], PTA

## 2021-01-01 VITALS
OXYGEN SATURATION: 96 % | HEIGHT: 64 IN | DIASTOLIC BLOOD PRESSURE: 66 MMHG | HEART RATE: 65 BPM | BODY MASS INDEX: 31.74 KG/M2 | SYSTOLIC BLOOD PRESSURE: 120 MMHG | TEMPERATURE: 98.6 F | WEIGHT: 185.9 LBS | RESPIRATION RATE: 18 BRPM

## 2021-01-01 LAB
ABSOLUTE EOS #: 0.03 K/UL (ref 0–0.44)
ABSOLUTE IMMATURE GRANULOCYTE: 0.12 K/UL (ref 0–0.3)
ABSOLUTE LYMPH #: 1.72 K/UL (ref 1.1–3.7)
ABSOLUTE MONO #: 0.43 K/UL (ref 0.1–1.2)
BASOPHILS # BLD: 0 % (ref 0–2)
BASOPHILS ABSOLUTE: <0.03 K/UL (ref 0–0.2)
DIFFERENTIAL TYPE: ABNORMAL
EOSINOPHILS RELATIVE PERCENT: 0 % (ref 1–4)
GLUCOSE BLD-MCNC: 212 MG/DL (ref 65–105)
GLUCOSE BLD-MCNC: 242 MG/DL (ref 65–105)
GLUCOSE BLD-MCNC: 337 MG/DL (ref 65–105)
HCT VFR BLD CALC: 41.8 % (ref 36.3–47.1)
HEMOGLOBIN: 13.1 G/DL (ref 11.9–15.1)
IMMATURE GRANULOCYTES: 1 %
LYMPHOCYTES # BLD: 21 % (ref 24–43)
MCH RBC QN AUTO: 27.5 PG (ref 25.2–33.5)
MCHC RBC AUTO-ENTMCNC: 31.3 G/DL (ref 28.4–34.8)
MCV RBC AUTO: 87.8 FL (ref 82.6–102.9)
MONOCYTES # BLD: 5 % (ref 3–12)
NRBC AUTOMATED: 0 PER 100 WBC
PDW BLD-RTO: 15.5 % (ref 11.8–14.4)
PLATELET # BLD: 407 K/UL (ref 138–453)
PLATELET ESTIMATE: ABNORMAL
PMV BLD AUTO: 10.2 FL (ref 8.1–13.5)
RBC # BLD: 4.76 M/UL (ref 3.95–5.11)
RBC # BLD: ABNORMAL 10*6/UL
SEG NEUTROPHILS: 73 % (ref 36–65)
SEGMENTED NEUTROPHILS ABSOLUTE COUNT: 6.03 K/UL (ref 1.5–8.1)
WBC # BLD: 8.3 K/UL (ref 3.5–11.3)
WBC # BLD: ABNORMAL 10*3/UL

## 2021-01-01 PROCEDURE — 6360000002 HC RX W HCPCS: Performed by: INTERNAL MEDICINE

## 2021-01-01 PROCEDURE — 36415 COLL VENOUS BLD VENIPUNCTURE: CPT

## 2021-01-01 PROCEDURE — 85025 COMPLETE CBC W/AUTO DIFF WBC: CPT

## 2021-01-01 PROCEDURE — 94761 N-INVAS EAR/PLS OXIMETRY MLT: CPT

## 2021-01-01 PROCEDURE — 99232 SBSQ HOSP IP/OBS MODERATE 35: CPT | Performed by: INTERNAL MEDICINE

## 2021-01-01 PROCEDURE — 2700000000 HC OXYGEN THERAPY PER DAY

## 2021-01-01 PROCEDURE — 82947 ASSAY GLUCOSE BLOOD QUANT: CPT

## 2021-01-01 PROCEDURE — 6370000000 HC RX 637 (ALT 250 FOR IP): Performed by: INTERNAL MEDICINE

## 2021-01-01 PROCEDURE — 99239 HOSP IP/OBS DSCHRG MGMT >30: CPT | Performed by: INTERNAL MEDICINE

## 2021-01-01 RX ADMIN — INSULIN LISPRO 4 UNITS: 100 INJECTION, SOLUTION INTRAVENOUS; SUBCUTANEOUS at 09:01

## 2021-01-01 RX ADMIN — Medication 1000 UNITS: at 08:59

## 2021-01-01 RX ADMIN — LISINOPRIL 20 MG: 20 TABLET ORAL at 08:59

## 2021-01-01 RX ADMIN — METFORMIN HYDROCHLORIDE 1000 MG: 500 TABLET ORAL at 08:58

## 2021-01-01 RX ADMIN — CARVEDILOL 6.25 MG: 6.25 TABLET, FILM COATED ORAL at 08:59

## 2021-01-01 RX ADMIN — GLIPIZIDE 5 MG: 5 TABLET ORAL at 08:58

## 2021-01-01 RX ADMIN — GLIPIZIDE 5 MG: 5 TABLET ORAL at 16:12

## 2021-01-01 RX ADMIN — ENOXAPARIN SODIUM 40 MG: 40 INJECTION SUBCUTANEOUS at 09:01

## 2021-01-01 RX ADMIN — AMLODIPINE BESYLATE 10 MG: 10 TABLET ORAL at 08:59

## 2021-01-01 RX ADMIN — DEXAMETHASONE 6 MG: 4 TABLET ORAL at 08:59

## 2021-01-01 RX ADMIN — INSULIN LISPRO 4 UNITS: 100 INJECTION, SOLUTION INTRAVENOUS; SUBCUTANEOUS at 12:14

## 2021-01-01 NOTE — PROGRESS NOTES
Home Oxygen Evaluation    Home Oxygen Evaluation completed. Patient is on 2.5 liters per minute via NC. Resting SpO2 = 94%  Resting SpO2 on room air = 90%    SpO2 on room air with exercise = 84%  SpO2 on oxygen as above with exercise = 90%    Nocturnal Oximetry with patient on room air is recommended is SpO2 is between 89% and 95% (requires additional order).     Vibha Horn  12:07 PM

## 2021-01-01 NOTE — PROGRESS NOTES
Patient was evaluated today for the diagnosis of COVID19 Pneumonia. I entered a DME order for home oxygen because the diagnosis and testing requires the patient to have supplemental oxygen. Condition will improve or be benefited by oxygen use. The patient is  able to perform good mobility in a home setting and therefore does require the use of a portable oxygen system. The need for this equipment was discussed with the patient and she understands and is in agreement.

## 2021-01-01 NOTE — PROGRESS NOTES
[facility]  Office: [address], DO, Juvenal Erin, DO, Kristiechristos Roque, DO, Laney Huff Blood, DO, Hector Lagunas MD, Neil Lu MD, Betty Choi MD, Roque Moody MD, Kaylin Gutierrez MD, Karan Gonzales MD, Tila Kimble MD, Kailey Cardona MD, Christian Luna MD, Rafael Leach, DO, Leeroy Huertas MD, Nadia Flowers MD, Mitra Grimes, DO, Sixto Jackson MD,  Chuy Hobbs, DO, Sergio Dickson MD, Flavio Campos MD, Immanuel Mckinley, [facility], [facility], CNP, Aletha Aranda, CNP, Marysol Bender, CNS, Lizabeth Johnson, CNP, Leobardo Vitale, CNP, Clarke Milligan, CNP, Chaparro Chopra, CNP, Sandra Meyer, CNP, Maria Luisa Elam PA-C, Rosie Cheng, [facility], Hussein Wilburn, CNP, Christie Rich, CNP, Helen Bolivar, CNP, Magan Her, CNP, Libra Heard, [address] [place] [address]    Progress Note    1/1/2021    12:40 PM    Name:   Roque Flores  MRN:     [id number]     [place]:      [de-identified]   Room:   2015/2015-01  IP Day:  7  Admit Date:  12/25/2020  6:49 AM    PCP:   Rachel Lofton MD  Code Status:  Full Code    Subjective:     C/C:   Chief Complaint   Patient presents with    Chest Pain    Cough     Interval History Status: worsened. Better today, currently on nasal cannula at 2 to 3 L and saturating 95%. Completed home O2 evaluation desaturated to 85%, patient was able to ambulate at 90% on nasal cannula. Discussed that she should have her daughter stay at her over the weekend to evaluate her safety. Patient will have outpatient physical therapy and Occupational Therapy. Patient will be discharged home today to follow-up outpatient with a primary care physician as well    Brief History:      This is an 49-year-old Afro-American female presents with a complaint of chest pressure, cough and increasing fatigue.  Her symptoms started this past Monday and have progressively worsened throughout the week.  She presented here and was found to reviewed. No pertinent family history. Vitals:  /66   Pulse 65   Temp 98.6 °F (37 °C) (Oral)   Resp 18   Ht 5' 4\" (1.626 m)   Wt 185 lb 14.4 oz (84.3 kg)   SpO2 96%   BMI 31.91 kg/m²   Temp (24hrs), Av.3 °F (36.8 °C), Min:98.1 °F (36.7 °C), Max:98.6 °F (37 °C)    Recent Labs     20  0855   POCGLU 278* 337* 334* 212*       I/O (24Hr): Intake/Output Summary (Last 24 hours) at 2021 1240  Last data filed at 2021 0850  Gross per 24 hour   Intake --   Output 1025 ml   Net -1025 ml       Labs:  Hematology:  Recent Labs     20  0425 20  0656   WBC 7.5 8.7 8.3   RBC 4.64 4.57 4.76   HGB 12.8 12.6 13.1   HCT 41.7 42.0 41.8   MCV 89.9 91.9 87.8   MCH 27.6 27.6 27.5   MCHC 30.7 30.0 31.3   RDW 15.4* 15.7* 15.5*    327 407   MPV 10.4 10.6 10.2     Chemistry:  No results for input(s): NA, K, CL, CO2, GLUCOSE, BUN, CREATININE, MG, ANIONGAP, LABGLOM, GFRAA, CALCIUM, CAION, PHOS, PSA, PROBNP, TROPHS, CKTOTAL, CKMB, CKMBINDEX, MYOGLOBIN, DIGOXIN, LACTACIDWB in the last 72 hours. Recent Labs     20  1309 20  1703 12/30/20  1956 12/31/20  1649 12/31/20  2054 01/01/21  0855   POCGLU 258* 272* 278* 337* 334* 212*     ABG:No results found for: POCPH, PHART, PH, POCPCO2, CHR9ZXL, PCO2, POCPO2, PO2ART, PO2, POCHCO3, EGH4YUZ, HCO3, NBEA, PBEA, BEART, BE, THGBART, THB, WGH4VYZ, IXEF7DKM, R2CRGJHH, O2SAT, FIO2  Lab Results   Component Value Date/Time    SPECIAL NOT REPORTED 2019 08:34 AM     No results found for: CULTURE    Radiology:  Xr Chest Portable    Result Date: 2020  No cardiomegaly or interstitial edema. Subtle non consolidating pneumonia appears to be developing in both lower lobes.        Physical Examination:        General appearance:  alert, cooperative and no distress  Mental Status:  oriented to person, place and time and normal affect  Lungs:  clear to auscultation bilaterally, normal effort, diminished throughout  Heart:  regular rate and rhythm, no murmur  Abdomen:  soft, nontender, nondistended, normal bowel sounds, no masses, hepatomegaly, splenomegaly  Extremities:  no edema, redness, tenderness in the calves  Skin:  no gross lesions, rashes, induration    Assessment:        Hospital Problems           Last Modified POA    * (Principal) Pneumonia due to COVID-19 virus 12/25/2020 Yes    Type 2 diabetes mellitus with hyperglycemia, without long-term current use of insulin ([facility] 12/25/2020 Yes    Essential hypertension 12/25/2020 Yes    [facility] hypox[facility] 12/25/2020 Yes    Elevated C-reactive protein (CRP) 12/25/2020 Yes          Plan:        1. Pulmonary consult   2. High flow nasal cannula during daytime, BiPAP at night  3. He on nasal cannula, encourage family to stay over the weekend to monitor her. Patient lives at home alone on 1 story building  4. Patient qualified for home O2, will arrange now  5. Plan for discharge later today once all arrangements have been made.     Mira Pfeiffer,   1/1/2021  12:40 PM never

## 2021-01-01 NOTE — DISCHARGE INSTR - OTHER ORDERS
9 Things To Do If You've Been Exposed to COVID-19    Stay home. If you've been exposed, you should stay in isolation for 14 days. Don't go to school, work, or public areas. And don't use public transportation, ride-shares, or taxis unless you have no choice. Leave your home only if you need to get medical care. But call the doctor's office first so they know you're coming, and wear a cloth face cover when you go. Call your doctor. Call your doctor or other health professional to let them know that you've been exposed. They might want you to be tested, or they may have other instructions for you. If you become sick, wear a face cover when you are around other people. It can help stop the spread of the virus when you cough or sneeze. Limit contact with people in your home. If possible, stay in a separate bedroom and use a separate bathroom. Avoid contact with pets and other animals. Cover your mouth and nose with a tissue when you cough or sneeze. Then throw it in the trash right away. Wash your hands often, especially after you cough or sneeze. Use soap and water, and scrub for at least 20 seconds. If soap and water aren't available, use an alcohol-based hand . Don't share personal household items. These include bedding, towels, cups and glasses, and eating utensils. Clean and disinfect your home every day. Use household  or disinfectant wipes or sprays. Take special care to clean things that you grab with your hands. These include doorknobs, remote controls, phones, and handles on your refrigerator and microwave. And don't forget countertops, tabletops, bathrooms, and computer keyboards. Current as of: July 10, 2020               Content Version: 12.6  © 2006-2020 [employer]. Care instructions adapted under license by [facility] ([facility]).  If you have questions about a medical condition or this instruction, always ask your healthcare you increase the amount of fluids you drink. · Take acetaminophen (such as Tylenol) to reduce a fever. It may also help with muscle aches. Read and follow all instructions on the label. If you are in isolation after you get home  · Wear a cloth face cover when you are around other people. It can help stop the spread of the virus when you cough or sneeze. · Limit contact with people in your home. If possible, stay in a separate bedroom and use a separate bathroom. · If you have to leave home, avoid crowds and try to stay at least 6 feet away from other people. · Avoid contact with pets and other animals. · Cover your mouth and nose with a tissue when you cough or sneeze. Then throw it in the trash right away. · Wash your hands often, especially after you cough or sneeze. Use soap and water, and scrub for at least 20 seconds. If soap and water aren't available, use an alcohol-based hand . · Don't share personal household items. These include bedding, towels, cups and glasses, and eating utensils. · [address] [facility] [address] in the warmest water allowed for the fabric type, and dry it completely. It's okay to wash other people's laundry with yours. · Clean and disinfect your home every day. Use household  and disinfectant wipes or sprays. Take special care to clean things that you grab with your hands. These include doorknobs, remote controls, phones, and handles on your refrigerator and microwave. And don't forget countertops, tabletops, bathrooms, and computer keyboards. When should you call for help? Call 911 anytime you think you may need emergency care. For example, call if you have life-threatening symptoms, such as:    · You have severe trouble breathing.  (You can't talk at all.)     · You have constant chest pain or pressure.     · You are severely dizzy or lightheaded.     · You are confused or can't think clearly.     · Your face and lips have a blue color.     · You passed out (lost consciousness) or are very hard to wake up. Call your doctor now or seek immediate medical care if:    · You have moderate trouble breathing. (You can't speak a full sentence.)     · You are coughing up blood (more than about 1 teaspoon).     · You have signs of low blood pressure. These include feeling lightheaded; being too weak to stand; and having cold, pale, clammy skin. Watch closely for changes in your health, and be sure to contact your doctor if:    · Your symptoms get worse.     · You are not getting better as expected.     · You have new or worse symptoms of anxiety, depression, nightmares, or flashbacks. Call before you go to the doctor's office. Follow their instructions. And wear a cloth face cover. Current as of: July 10, 2020               Content Version: 12.6  © 2006-2020 [employer]. Care instructions adapted under license by [facility] ([facility]). If you have questions about a medical condition or this instruction, always ask your healthcare professional. [address] any warranty or liability for your use of this information.

## 2021-01-01 NOTE — PROGRESS NOTES
Cardiology Patient reports receiving medications from [facility] on [address] [place] [address]. Will update .

## 2021-01-01 NOTE — PLAN OF CARE
Problem: Airway Clearance - Ineffective  Goal: Achieve or maintain patent airway  Outcome: Completed     Problem: Gas Exchange - Impaired  Goal: Absence of hypoxia  Outcome: Completed  Goal: Promote optimal lung function  Outcome: Completed     Problem: Breathing Pattern - Ineffective  Goal: Ability to achieve and maintain a regular respiratory rate  Outcome: Completed

## 2021-01-01 NOTE — DISCHARGE INSTR - COC
Continuity of Care Form    Patient Name: Brittni Baker   :  1940  MRN:  [id number]    Admit date:  2020  Discharge date:  ***    Code Status Order: Full Code   Advance Directives:   [facility] Documentation       Date/Time Healthcare Directive Type of Healthcare Directive Copy in [facility] [address] Agent's Name Healthcare Agent's Phone Number    20 1244  No, patient does not have an advance directive for healthcare treatment -- -- -- -- --            Admitting Physician:  Paddy Cunningham DO  PCP: Wenceslao Frazier MD    Discharging Nurse: [facility] Unit/Room#:   Discharging Unit Phone Number: ***    Emergency Contact:   Extended Emergency Contact Information  Primary Emergency Contact: Teaganfeng Knighta  Address: [address] [place] [address] Phone: [phone]  Work Phone: [phone]  Mobile Phone: [phone]  Relation: Child  Hearing or visual needs: None  Other needs: None  Preferred language: English   needed? No  Secondary Emergency Contact: Inder Gonsales  Mobile Phone: [phone]  Relation: Brother/Sister  Preferred language: English   needed?  No    Past Surgical History:  Past Surgical History:   Procedure Laterality Date    EYE SURGERY      HYSTERECTOMY      HYSTERECTOMY, TOTAL ABDOMINAL      TUBAL LIGATION         Immunization History:   Immunization History   Administered Date(s) Administered    Influenza A (H5N1) Monovalent Vaccine, Adjuvanted-2019    Influenza Vaccine, unspecified formulation 2012, 10/07/2013, 2014, 2015, 2016    Influenza Virus Vaccine 2015, 10/07/2018    Influenza, High Dose (Fluzone 65 yrs and older) 2015, 2016, 2017    Influenza, Triv, inactivated, subunit, adjuvanted, IM (Fluad 65 yrs and older) 10/27/2018, 2019, 2020    Pneumococcal Conjugate 13-valent (Kati Sins) 09/14/2015, 12/15/2017    Pneumococcal Polysaccharide (Jjvjfjlam79) 01/27/2006, 09/01/2015, 05/21/2018    Td, unspecified formulation 01/27/2006    Zoster Live (Zostavax) 01/01/2011       Active Problems:  Patient Active Problem List   Diagnosis Code    Type 2 diabetes mellitus with hyperglycemia, without long-term current use of insulin ([facility] E11.65    Astigmatism, regular H52.229    Background diabetic retinopathy ([facility] E11.3299    Essential hypertension I10    Eczema L30.9    Generalized osteoarthritis M15.9    Hyperlipidemia E78.5    Hypertensive retinopathy of both eyes H35.033    Nonsmoker Z78.9    Pseudophakia H28.9    Systolic murmur H78.4    Temporary cerebral vascular dysfunction G93.9    Pneumonia due to COVID-19 virus U07.1, J12.82    Acute hypoxemic respiratory failure (HCC) J96.01    Elevated C-reactive protein (CRP) R79.82       Isolation/Infection:   Isolation            Droplet Plus          Patient Infection Status       Infection Onset Added Last Indicated Last Indicated By Review Planned Expiration Resolved Resolved By    COVID-19 12/25/20 12/25/20 12/25/20 COVID-19 01/01/21 01/08/21      Resolved    COVID-19 Rule Out 12/25/20 12/25/20 12/25/20 COVID-19 (Ordered)   12/25/20 Rule-Out Test Resulted            Nurse Assessment:  Last Vital Signs: /66   Pulse 65   Temp 98.6 °F (37 °C) (Oral)   Resp 18   Ht 5' 4\" (1.626 m)   Wt 185 lb 14.4 oz (84.3 kg)   SpO2 96%   BMI 31.91 kg/m²     Last documented pain score (0-10 scale): Pain Level: 0  Last Weight:   Wt Readings from Last 1 Encounters:   12/27/20 185 lb 14.4 oz (84.3 kg)     Mental Status:  {IP PT MENTAL STATUS:20030:::0}    IV Access:  { HEIDE IV ACCESS:311654225:::0}    Nursing Mobility/ADLs:  Walking   {CHP DME ADLs:909957191:::0}  Transfer  {CHP DME ADLs:182773891:::0}  Bathing  {CHP DME ADLs:838656860:::0}  Dressing  {CHP DME ADLs:586490464:::0}  Toileting  {CHP DME ADLs:528281981:::0}  Feeding  {CHP DME ADLs:000496078:::0}  Med Admin  {[facility] DME ADLs:781095810:::0}  Med Delivery   { HEIDE MED Delivery:535858421:::0}    Wound Care Documentation and Therapy:        Elimination:  Continence:    Bowel: {YES / CR:13753}  Bladder: {YES / M}  Urinary Catheter: {Urinary Catheter:193796811:::0}   Colostomy/Ileostomy/Ileal Conduit: {YES / QJ:73196}       Date of Last BM: ***    Intake/Output Summary (Last 24 hours) at 2021 1244  Last data filed at 2021 1243  Gross per 24 hour   Intake 200 ml   Output 1275 ml   Net -1075 ml     I/O last 3 completed shifts:  In: -   Out: 225 [Urine:225]    Safety Concerns:     [address] [employer] Safety Concerns:109261568:::0}    Impairments/Disabilities:      [address] [employer] Impairments/Disabilities:378579191:::0}    Nutrition Therapy:  Current Nutrition Therapy:   [address] [employer] Diet List:206122233:::0}    Routes of Feeding: {[facility] DME Other Feedings:594822648:::0}  Liquids: {Slp liquid thickness:33413}  Daily Fluid Restriction: {[facility] DME Yes amt example:718481234:::0}  Last Modified Barium Swallow with Video (Video Swallowing Test): {Done Not Done RYUA:066306233:::4}    Treatments at the Time of Hospital Discharge:   Respiratory Treatments: ***  Oxygen Therapy:  {Therapy; copd oxygen:87700:::0}  Ventilator:    { CC Vent List:386576979:::0}    Rehab Therapies: {THERAPEUTIC INTERVENTION:1991235559}  Weight Bearing Status/Restrictions: [address] [employer] Weight Bearin:::0}  Other Medical Equipment (for information only, NOT a DME order):  {EQUIPMENT:029880083}  Other Treatments: ***    Patient's personal belongings (please select all that are sent with patient):  {[facility] DME Belongings:482922581:::0}    RN SIGNATURE:  {Esignature:644817917:::0}    CASE MANAGEMENT/SOCIAL WORK SECTION    Inpatient Status Date: ***    Readmission Risk Assessment Score:  Readmission Risk              Risk of Unplanned Readmission:        9           Discharging to Facility/ Agency   Name:   Address:  Phone:  Fax:    Dialysis Facility (if applicable) Name:  Address:  Dialysis Schedule:  Phone:  Fax:    / signature: {Esignature:806838550:::0}    PHYSICIAN SECTION    Prognosis: Good    Condition at Discharge: Stable    Rehab Potential (if transferring to Rehab): Good    Recommended Labs or Other Treatments After Discharge: None    Physician Certification: I certify the above information and transfer of Love Thurman  is necessary for the continuing treatment of the diagnosis listed and that she requires [address] for greater 30 days.      Update Admission H&P: No change in H&P    PHYSICIAN SIGNATURE:  Electronically signed by Cheryl Gates DO on 1/1/21 at 12:44 PM EST

## 2021-01-01 NOTE — CARE COORDINATION
Call into [facility] for O2, Desat eval, home O2 order and face to face faxed to [facility].     I spoke with after hours answering service to notify them of O2 need, they will notify the  Jurgen Cuellar returned call, will deliver O2    Discharge [address] [facility] [place] Case Management Department  Written by: Ted Camacho RN    Patient Name: Marshal Garcia  Attending Provider: Maye Corbin DO  Admit Date: 2020  6:49 AM  MRN: [id number]  Account: [de-identified]                     : 1940  Discharge Date: 2021      Disposition: home with Home O2 per [facility]    Ted Camacho RN

## 2021-01-01 NOTE — DISCHARGE SUMMARY
[facility]  Office: [address], DO, Domonique Herbert, DO, Lorraine Inman, DO, Dustinleopoldo Heller, DO, Catina Smith MD, Ramone Sexton MD, Juliette Rocha MD, Brian Samuels MD, Rusty Dockery MD, Brandi Jimenez MD, Chandana Nino MD, Joseph Mack MD, Mbonu Mable Nyhan, MD, Estuardo Lopez DO, Devyn Carmona MD, Alvaro Pollack MD, Finesse Sanz DO, Tia Blanco MD,  Ramiro Peguero DO, Davin Short MD, Daren Nelson MD, Cortez Martinez, [facility], [facility], CNP, Sin Cardoso, CNP, Estelle Cruz, CNS, Yessica Mora, CNP, Baron Toribio, CNP, Lux Vasquez, CNP, Davi Mccain, CNP, Keila Pompa, CNP, Radha Birmingham PA-C, Virginie Garcia, [facility], David Rodriguez, CNP, Jade Parra, CNP, Dmitri Haley, CNP, Ted Mejia, CNP, Jesse Granger, [facility]    Discharge Summary     Patient ID: Charisma Braun  :  1940   MRN: [id number]     ACCOUNT:  [de-identified]   Patient's PCP: Bertrand Menon MD  Admit Date: 2020   Discharge Date: 2021     Length of Stay: 7  Code Status:  Full Code  Admitting Physician: Lance Homans, DO  Discharge Physician: Finesse Sanz DO     Active Discharge Diagnoses:     Hospital Problem Lists:  Principal Problem:    Pneumonia due to COVID-19 virus  Active Problems:    Type 2 diabetes mellitus with hyperglycemia, without long-term current use of insulin (HCC)    Essential hypertension    Acute hypoxemic respiratory failure (HCC)    Elevated C-reactive protein (CRP)  Resolved Problems:    * No resolved hospital problems. *      Admission Condition:  good     Discharged Condition: good    Hospital Stay:     Hospital Course:  Charisma Braun is a 78 y.o. female who was admitted for the management of   Pneumonia due to COVID-19 virus , presented to ER with Chest Pain and Cough    Presented with shortness of breath and found to be Covid positive.   X-ray showed groundglass opacities, patient was initially placed on simple mask by the emergency department. She was admitted and evaluated by internal medicine and infectious disease. Patient was started on remdesivir Decadron and convalescent plasma was given. Patient tolerated therapy well, patient oxygen requirements remained high even after therapy, through use of BiPAP and high flow nasal cannula alternating patient was able to be weaned to simple nasal cannula. Patient has since recovered. She has no complaints at this time, she still requires some therapy when she is discharged, she still requires oxygen at this time. On 1/1/2021 patient was determined to be ready for discharge, home O2 evaluation was ordered and patient qualified for oxygen. Patient will be discharged today to follow-up with her primary care physician in 1 week, no medication changes were made.       Significant therapeutic interventions: None    Significant Diagnostic Studies:   Labs / Micro:  CBC:   Lab Results   Component Value Date    WBC 8.3 01/01/2021    RBC 4.76 01/01/2021    HGB 13.1 01/01/2021    HCT 41.8 01/01/2021    MCV 87.8 01/01/2021    MCH 27.5 01/01/2021    MCHC 31.3 01/01/2021    RDW 15.5 01/01/2021     01/01/2021     BMP:    Lab Results   Component Value Date    GLUCOSE 227 12/29/2020     12/29/2020    K 4.2 12/29/2020     12/29/2020    CO2 25 12/29/2020    ANIONGAP 8 12/29/2020    BUN 22 12/29/2020    CREATININE 0.63 12/29/2020    BUNCRER NOT REPORTED 12/29/2020    CALCIUM 8.2 12/29/2020    LABGLOM >60 12/29/2020    GFRAA >60 12/29/2020    GFR      12/29/2020    GFR NOT REPORTED 12/29/2020     CMP:    Lab Results   Component Value Date    GLUCOSE 227 12/29/2020     12/29/2020    K 4.2 12/29/2020     12/29/2020    CO2 25 12/29/2020    BUN 22 12/29/2020    CREATININE 0.63 12/29/2020    ANIONGAP 8 12/29/2020    ALKPHOS 106 12/25/2020    ALT 8 12/25/2020    AST 21 12/25/2020    BILITOT 0.26 12/25/2020    LABALBU 3.0 12/27/2020 ALBUMIN 0.8 12/25/2020    LABGLOM >60 12/29/2020    GFRAA >60 12/29/2020    GFR      12/29/2020    GFR NOT REPORTED 12/29/2020    PROT 7.3 12/25/2020    CALCIUM 8.2 12/29/2020     PT/INR:    Lab Results   Component Value Date    PROTIME 11.2 12/27/2020    INR 1.1 12/27/2020        Radiology:  Xr Chest (single View Frontal)    Result Date: 12/29/2020  Scattered areas of ground-glass attenuation most pronounced in the lower lobes consistent with pneumonia. Consultations:    Consults:     Final Specialist Recommendations/Findings:   IP CONSULT TO HOSPITALIST  IP CONSULT TO PHARMACY  IP CONSULT TO INFECTIOUS DISEASES  IP CONSULT TO PULMONOLOGY  IP CONSULT TO HOME CARE NEEDS      The patient was seen and examined on day of discharge and this discharge summary is in conjunction with any daily progress note from day of discharge.     Discharge plan:     Disposition: Home    Physician Follow Up:   Francine Sibley MD  [facility] [address] [facility] [address] [facility] [address]  [phone]    Schedule an appointment as soon as possible for a visit in 2 weeks  hospital followup    Mela Capellan MD  [address] [facility] [id number] [phone]    In 3 weeks  Lung doctor, office will call       Requiring Further Evaluation/Follow Up POST HOSPITALIZATION/Incidental Findings: none    Diet: regular diet    Activity: As tolerated    Instructions to Patient: See PCP 1-2 weeks    Discharge Medications:      Medication List      CHANGE how you take these medications    fish oil 1000 MG Caps  Take 1 capsule by mouth 3 times daily  What changed: when to take this     metFORMIN 1000 MG tablet  Commonly known as: GLUCOPHAGE  Take 1 tablet by mouth 2 times daily (with meals)  What changed: when to take this        CONTINUE taking these medications    amLODIPine 10 MG tablet  Commonly known as: NORVASC  take 1 tablet by mouth once daily     carvedilol 6.25 MG tablet  Commonly known as: Coreg  Take 1 tablet by mouth 2 times daily

## 2021-01-01 NOTE — PLAN OF CARE
physical injury  Description: Absence of physical injury  Outcome: Ongoing     Problem: Nutrition  Goal: Optimal nutrition therapy  Description: Nutrition Problem #1: Inadequate protein intake  Intervention: Food and/or Nutrient Delivery: Continue Current Diet, Start Oral Nutrition Supplement  Nutritional Goals: Pt to meet % of est'd needs via PO     Outcome: Ongoing

## 2021-01-01 NOTE — PROGRESS NOTES
CONSULT  Note    Patient - Donnice Hodgkins  Date of Admission -  12/25/2020  6:49 AM  Date of Evaluation -  1/1/2021  Room and Bed Number -  2015/2015-01   Hospital Day - 7    CHIEF COMPLAINT : ACUTE HYPOXIC RESPIRATORY FAILURE DUE TO COVID -19 PNEUMONIA   HPI :   Donnice Hodgkins 78 y.o. female admitted on 12/25/20 for covid -19 pna . She had been on nasal cannula 6 l then 10 l via simple face mask however since yestreday has been requiring high flow o2 - she is now on 55 % , 30 l , rr less than 20 - she has no tachypnea , no wob . Afebrile   Has cough   She has completed plasma , remdesivir .   On dexamethasone   1/1/2021   Subjective      ON LOW FLOW 2.5 L O2   NO WOB   HOME O2 EVAL NOTED   D/W RT     SECRETIONS  -Amount:  [] Small [] Moderate  [] Large    [x] None  Color:     [] White [] Colored  [] Bloody    SEDATION:    [] Propofol gtt  [] Versed gtt  [] FENTANYL  gtt  gtt   [x] No Sedation    PARALYZED:  [x] No    [] Yes    VASOPRESSORS:  [x] No    [] Yes  [] Levophed [] Dopamine [] Vasopressin  [] Dobutamine [] Phenylephrine [] Epinephrine    INHALED NITRIC OXIDE : [x] No    [] Yes    PRONE :       [x] No    [] Yes    REMDESIVIR:             [] No    [x] Yes    DEXAMETHASONE : [] No    [x] Yes    CONVALESCENT PLASMA : [] No    [x] Yes  Past Medical History:   Diagnosis Date    Arthritis     Diabetes mellitus ([facility] [address]     Hyperlipidemia 11/9/2016    Hypertension     Hypokalemia with normal acid-base balance 9/13/2016    Pneumonia due to COVID-19 virus 12/25/2020    Type 2 diabetes mellitus with hyperosmolarity not at goal [facility]) 9/13/2016        Social History     Socioeconomic History    Marital status:      Spouse name: None    Number of children: None    Years of education: None    Highest education level: None   Occupational History    None   Social Needs    Financial resource strain: None    Food insecurity     Worry: None     Inability: None    Transportation needs     Medical: None Non-medical: None   Tobacco Use    Smoking status: Never Smoker    Smokeless tobacco: Never Used   Substance and Sexual Activity    Alcohol use: No    Drug use: No    Sexual activity: None   Lifestyle    Physical activity     Days per week: None     Minutes per session: None    Stress: None   Relationships    Social connections     Talks on phone: None     Gets together: None     Attends Faith service: None     Active member of club or organization: None     Attends meetings of clubs or organizations: None     Relationship status: None    Intimate partner violence     Fear of current or ex partner: None     Emotionally abused: None     Physically abused: None     Forced sexual activity: None   Other Topics Concern    None   Social History Narrative    None       Immunization History   Administered Date(s) Administered    Influenza A (H5N1) Monovalent Vaccine, Adjuvanted-2019    Influenza Vaccine, unspecified formulation 2012, 10/07/2013, 2014, 2015, 2016    Influenza Virus Vaccine 2015, 10/07/2018    Influenza, High Dose (Fluzone 65 yrs and older) 2015, 2016, 2017    Influenza, Triv, inactivated, subunit, adjuvanted, IM (Fluad 65 yrs and older) 10/27/2018, 2019, 2020    Pneumococcal Conjugate 13-valent (Coretha Lancaster) 2015, 12/15/2017    Pneumococcal Polysaccharide ([id number]) 2006, 2015, 2018    Td, unspecified formulation 2006    Zoster Live (Zostavax) 2011         History reviewed. No pertinent family history.       Past Surgical History:   Procedure Laterality Date    EYE SURGERY      HYSTERECTOMY      HYSTERECTOMY, TOTAL ABDOMINAL      TUBAL LIGATION          OBJECTIVE:     VITAL SIGNS:  /66   Pulse 65   Temp 98.6 °F (37 °C) (Oral)   Resp 18   Ht 5' 4\" (1.626 m)   Wt 185 lb 14.4 oz (84.3 kg)   SpO2 96%   BMI 31.91 kg/m²   Tmax over 24 hours:  Temp (24hrs), Av.4 °F (36.9 °C), Min:98.2 °F (36.8 °C), Max:98.6 °F (37 °C)      Patient Vitals for the past 8 hrs:   Temp Temp src Resp SpO2   01/01/21 1037 -- -- 18 96 %   01/01/21 0850 98.6 °F (37 °C) Oral -- --         Intake/Output Summary (Last 24 hours) at 1/1/2021 1512  Last data filed at 1/1/2021 1243  Gross per 24 hour   Intake 200 ml   Output 1275 ml   Net -1075 ml     Date 01/01/21 0000 - 01/01/21 2359   Shift 7427-6477 2549-2557 5746-1973 24 Hour Total   INTAKE   P.O.(mL/kg/hr)  200  200   Shift Total(mL/kg)  200(2.4)  200(2.4)   OUTPUT   Urine(mL/kg/hr)  1050  1050   Shift Total(mL/kg)  1050(12.5)  1050(12.5)   Weight (kg) 84.3 84.3 84.3 84.3     Wt Readings from Last 3 Encounters:   12/27/20 185 lb 14.4 oz (84.3 kg)   02/12/20 192 lb 3.2 oz (87.2 kg)   01/02/20 197 lb 3.2 oz (89.4 kg)     Body mass index is 31.91 kg/m². PHYSICAL EXAM:  I have discussed the care of Giuseppe Retana  including pertinent history and exam findings,  with the nursing staff I have seen  the patient and the key elements of all parts of the encounter have been performed by me. For careful stewardship of limited PPE during COVID-19 pandemic my physical exam was deferred. Ramona Canela        MEDICATIONS:  Scheduled Meds:   dexamethasone  6 mg Oral Daily    amLODIPine  10 mg Oral Daily    carvedilol  6.25 mg Oral BID    Vitamin D  1,000 Units Oral Daily    glipiZIDE  5 mg Oral BID AC    lisinopril  20 mg Oral Daily    metFORMIN  1,000 mg Oral Daily with breakfast    enoxaparin  40 mg Subcutaneous BID    insulin lispro  0-12 Units Subcutaneous TID WC    insulin lispro  0-6 Units Subcutaneous Nightly    insulin glargine  12 Units Subcutaneous Nightly     Continuous Infusions:   dextrose       PRN Meds:       albuterol sulfate HFA, 2 puff, Q6H PRN      benzonatate, 100 mg, TID PRN      potassium chloride, 40 mEq, PRN    Or      potassium alternative oral replacement, 40 mEq, PRN    Or      potassium chloride, 10 mEq, PRN      magnesium sulfate, 1 g, PRN      promethazine, 12.5 mg, Q6H PRN    Or      ondansetron, 4 mg, Q6H PRN      polyethylene glycol, 17 g, Daily PRN      nicotine, 1 patch, Daily PRN      acetaminophen, 650 mg, Q6H PRN    Or      acetaminophen, 650 mg, Q6H PRN      glucose, 15 g, PRN      dextrose, 12.5 g, PRN      glucagon (rDNA), 1 mg, PRN      dextrose, 100 mL/hr, PRN        SUPPORT DEVICES: [] Ventilator [] BIPAP  [x] high flow Nasal Cannula [] Room Air      ABGs:     No results found for: PHART, PH, CTC9JYD, PCO2, PO2ART, PO2, GIW7FPN, HCO3, BEART, BE, THGBART, THB, DKL6NXA, N6TJSREQ, O2SAT, FIO2    DATA:  Complete Blood Count:   Recent Labs     12/30/20  0425 12/31/20  0344 01/01/21  0656   WBC 7.5 8.7 8.3   RBC 4.64 4.57 4.76   HGB 12.8 12.6 13.1   HCT 41.7 42.0 41.8   MCV 89.9 91.9 87.8   MCH 27.6 27.6 27.5   MCHC 30.7 30.0 31.3   RDW 15.4* 15.7* 15.5*    327 407   MPV 10.4 10.6 10.2        Last 3 Blood Glucose:   No results for input(s): GLUCOSE in the last 72 hours. PT/INR:    Lab Results   Component Value Date    PROTIME 11.2 12/27/2020    INR 1.1 12/27/2020     PTT:    Lab Results   Component Value Date    APTT 25.5 12/27/2020       Comprehensive Metabolic Profile:   No results for input(s): NA, K, CL, CO2, BUN, CREATININE, GLUCOSE, CALCIUM, PROT, LABALBU, BILITOT, ALKPHOS, AST, ALT in the last 72 hours. Magnesium:   Lab Results   Component Value Date    MG 2.4 12/27/2020    MG 2.2 12/26/2020     Phosphorus:   Lab Results   Component Value Date    PHOS 2.3 12/27/2020    PHOS 2.2 12/26/2020    PHOS  12/26/2020     Unable to perform testing: Specimen quantity not sufficient.      Ionized Calcium: No results found for: CAION     Urinalysis: No results found for: Jason [place], PHUR, LABCAST, WBCUA, RBCUA, MUCUS, TRICHOMONAS, YEAST, BACTERIA, CLARITYU, SPECGRAV, LEUKOCYTESUR, UROBILINOGEN, BILIRUBINUR, BLOODU, GLUCOSEU, KETUA, AMORPHOUS            Xr Chest (single View Frontal)    Result Date: 12/29/2020  Scattered areas of ground-glass attenuation most pronounced in the lower lobes consistent with pneumonia. Xr Chest Portable    Result Date: 12/25/2020  No cardiomegaly or interstitial edema. Subtle non consolidating pneumonia appears to be developing in both lower lobes. VENTILATOR SETTINGS:  Vent Information  Skin Assessment: Clean, dry, & intact  Equipment Changed: Humidification  FiO2 : 45 %  SpO2: 96 %  SpO2/FiO2 ratio: 213.33  Humidification Source: Heated wire  Humidification Temp: 33  Humidification Temp Measured: 33     PaO2/FiO2 RATIO:  No results for input(s): POCPO2 in the last 72 hours. FiO2 : 45 %       LABS:  ABGs:   No results for input(s): POCPH, POCPCO2, POCPO2, POCHCO3, EEMS0IAU in the last 72 hours. ASSESSMENT:     Principal Problem:    Pneumonia due to COVID-19 virus  Active Problems:    Type 2 diabetes mellitus with hyperglycemia, without long-term current use of insulin (HCC)    Essential hypertension    Acute hypoxemic respiratory failure (HCC)    Elevated C-reactive protein (CRP)  Resolved Problems:    * No resolved hospital problems. *              Acute hypoxic respiratory failure secondary to COVID 19   Acute respiratory distress syndrome   Bilateral multifocal pneumonia due to COVID 19 infection   Covid -19 pandemic emergency     LOS: 7  PLAN:    OK TO DC ON HOME O2 WITH EXERTION   WILL FOLLOW IN CLINIC           Continue supportive care - complete treatment with dexamethasone . Remdesivir and plasma completed     WEAN PER PROTOCOL:  [] No   [] Yes  [x] N/A      ICU PROPHYLAXIS:  Stress ulcer:  [] PPI Agent  [] P4Sxwsf [] Sucralfate  [] Other:  VTE:   [] Enoxaparin  [] Unfract. Heparin Subcut  [] EPC Cuffs    NUTRITION:  [] NPO [] Tube Feeding (Specify: ) [] TPN  [x] PO        Treatment plan Discussed with nursing staff in detail , all questions answered .      Total critical care time caring for this patient with life threatening, unstable organ failure, including direct patient contact, management of life support systems, review of data including imaging and labs, discussions with other team members and physicians at least 27  Min so far today, excluding procedures. Electronically signed by Mirna Guadarrama MD on 1/1/2021 at 3:12 PM       This patient was evaluated in the context of the global SARS-CoV-2 (COVID-19) pandemic, which necessitated considerations that the patient either has COVID-19 infection or is at risk of infection with COVID-19. Institutional protocols and algorithms that pertain to the evaluation & management of patients with COVID-19 or those at risk for COVID-19 are in a state of rapid changes based on information released by regulatory bodies including the CDC and federal and state organizations. These policies and algorithms were followed during the patient's care. Please note that this chart was generated using voice recognition Dragon dictation software. Although every effort was made to ensure the accuracy of this automated transcription, some errors in transcription may have occurred.

## 2021-01-02 ENCOUNTER — CARE COORDINATION (OUTPATIENT)
Dept: CASE MANAGEMENT | Age: 81
End: 2021-01-02

## 2021-01-02 NOTE — CARE COORDINATION
[facility] Transitions Initial Follow Up Call    Call within 2 business days of discharge: Yes    Patient: Patti Cooley Patient : 1940   MRN: [id number]  Reason for Admission: Covid+/Covid-19 monitoring  Discharge Date: 21 RARS: Readmission Risk Score: 13      Last Discharge [facility]       Complaint Diagnosis Description Type Department Provider    20 Chest Pain; Cough COVID-19 . .. ED to Hosp-Admission (Discharged) (ADMITTED) [facility] CAR 2 Anoop Pozo DO; Jacky Jeffrey, ... Spoke with: Natalya Peter 888: [facility]    Was able to contact Ginny for initial Covid-19 monitoring. She stated that she was doing \"good\". She said that her breathing was \"ok\" with the oxygen, has an occasional cough, no diarrhea, no fever/chills and some weakness. She said that she is eating. She stated that she has all her medications and will be calling her PCP on Monday. Non-face-to-face services provided:  Scheduled appointment with PCP-pt to call PCP  Scheduled appointment with Specialist-pt to call pulmonology  Obtained and reviewed discharge summary and/or continuity of care documents  Assessment and support for treatment adherence and medication management-reviewed       Challenges to be reviewed by the provider   Additional needs identified to be addressed with provider No  none    Discussed COVID-19 related testing which was available at this time. Test results were positive. Patient informed of results, if available? Yes         Method of communication with provider : none    Advance Care Planning:   Does patient have an Advance Directive:  did not review. Was this a readmission? No  Patient stated reason for admission: shortness of breath  Patients top risk factors for readmission: medical condition    Care Transition Nurse (CTN) contacted the patient by telephone to perform post hospital discharge assessment. Verified name and  with patient as identifiers.  Provided introduction to self, and explanation of the CTN role. CTN reviewed discharge instructions, medical action plan and red flags with patient who verbalized understanding. Patient given an opportunity to ask questions and does not have any further questions or concerns at this time. Were discharge instructions available to patient? Yes. Reviewed appropriate site of care based on symptoms and resources available to patient including: PCP and When to call 911. The patient agrees to contact the PCP office for questions related to their healthcare. Medication review was performed with patient, who verbalizes understanding of administration of home medications. Covid Risk Education    Patient has following risk factors of: pneumonia and diabetes. Education provided regarding infection prevention, and signs and symptoms of COVID-19 and when to seek medical attention with patient who verbalized understanding. Discussed exposure protocols and quarantine From CDC: Are you at higher risk for severe illness?   and given an opportunity for questions and concerns. The patient agrees to contact the COVID-19 hotline [phone] or PCP office for questions related to COVID-19. For more information on steps you can take to protect yourself, see CDC's How to Protect Yourself     Patient/family/caregiver given information for [employer] and agrees to enroll no  Patient's preferred e-mail: declines  Patient's preferred phone number: declines    Discussed follow-up appointments. If no appointment was previously scheduled, appointment scheduling offered: No and office closed  . Is follow up appointment scheduled within 7 days of discharge? No  Non-[facility] follow up appointment(s):     Plan for follow-up call in 5-7 days based on severity of symptoms and risk factors. Plan for next call: symptom management-covid symptoms  CTN provided contact information for future needs.           Care Transitions 24 Hour Call    Do you

## 2021-01-04 ENCOUNTER — TELEPHONE (OUTPATIENT)
Dept: PULMONOLOGY | Age: 81
End: 2021-01-04

## 2021-01-04 LAB — GLUCOSE BLD-MCNC: 232 MG/DL (ref 65–105)

## 2021-01-04 NOTE — TELEPHONE ENCOUNTER
----- Message from Enrico Blanton sent at 1/4/2021  6:57 AM EST -----  Jair Pearce, please see message from Dr Geovanny Luke and call to schedule. Thank you.   ----- Message -----  From: Silva Huertas MD  Sent: 1/1/2021   3:11 PM EST  To: Pamela Alcantara DO, #    Please schedule follow up  with me in 3 weeks .    Thank you   Efren Watkins MD

## 2021-01-05 ENCOUNTER — CARE COORDINATION (OUTPATIENT)
Dept: CASE MANAGEMENT | Age: 81
End: 2021-01-05

## 2021-01-05 NOTE — CARE COORDINATION
[facility] Transitions Follow Up Call    2021    Patient: Carmen Peterson  Patient : 1940   MRN: [id number]  Reason for Admission: Covid+/Covid-19 monitoring  Discharge Date: 21 RARS: Readmission Risk Score: 13         Spoke with: Carmen Peterson    Was able to contact Ginny for Covid-19 monitoring. She stated that she was doing \"ok\". She said that her breathing was \"fine\", even with ambulation, coughing only about couple times a day, no fever/chills, abdominal pain/diarrhea and her appetite is \"pretty good\". She said that her taste is starting to return. She did not call and make follow up with PCP, but gave permission to writer to call. Phone appointment was made. Pt was also put on a call list if an earlier appointment time came available. Ginny was notified of appointment, time and call list.  Shanice Medina had no further concerns or questions at this time. Needs to be reviewed by the provider   Additional needs identified to be addressed with provider No  none  Discussed COVID-19 related testing which was available at this time. Test results were positive. Patient informed of results, if available? Yes         Method of communication with provider : none    Care Transition Nurse (CTN) contacted the patient by telephone to follow up after admission on 20. Verified name and  with patient as identifiers. Addressed changes since last contact: symptom management-covid follow up  Discharged needs reviewed: none  Follow up appointment completed? No    Advance Care Planning:   Does patient have an Advance Directive:  did not review. CTN reviewed discharge instructions, medical action plan and red flags with patient and discussed any barriers to care and/or understanding of plan of care after discharge. Discussed appropriate site of care based on symptoms and resources available to patient including: PCP, Specialist and When to call 911.  The patient agrees to contact the PCP office for questions related to their healthcare. Patients top risk factors for readmission: medical condition  Interventions to address risk factors: Assessment and support for treatment adherence and medication management-reviewed    Discussed follow-up appointments. If no appointment was previously scheduled, appointment scheduling offered: Yes and appointment was made Is follow up appointment scheduled within 7 days of discharge? No  Non-[facility] follow up appointment(s):     Plan for follow-up call in 7-10 days based on severity of symptoms and risk factors. Plan for next call: symptom management-covid follow up  CTN provided contact information for future needs. Care Transitions Subsequent and Final Call    Subsequent and Final Calls  Do you have any ongoing symptoms?: No  Have your medications changed?: No  Do you have any questions related to your medications?: No  Do you currently have any active services?: No  Do you have any needs or concerns that I can assist you with?: No  Care Transitions Interventions  Other Interventions:            Follow Up  Future Appointments   Date Time Provider Pauline Steel   1/14/2021 10:00 AM Kevin Calixto MD [facility]   1/18/2021 10:45 AM Vazquez Parker MD Resp Spec Kulwant Garcia RN

## 2021-01-14 ENCOUNTER — VIRTUAL VISIT (OUTPATIENT)
Dept: INTERNAL MEDICINE | Age: 81
End: 2021-01-14
Payer: MEDICARE

## 2021-01-14 DIAGNOSIS — U07.1 PNEUMONIA DUE TO COVID-19 VIRUS: Primary | ICD-10-CM

## 2021-01-14 DIAGNOSIS — J12.82 PNEUMONIA DUE TO COVID-19 VIRUS: Primary | ICD-10-CM

## 2021-01-14 PROCEDURE — 99496 TRANSJ CARE MGMT HIGH F2F 7D: CPT | Performed by: INTERNAL MEDICINE

## 2021-01-14 PROCEDURE — 1111F DSCHRG MED/CURRENT MED MERGE: CPT | Performed by: INTERNAL MEDICINE

## 2021-01-14 RX ORDER — LISINOPRIL 20 MG/1
TABLET ORAL
COMMUNITY
Start: 2021-01-08 | End: 2021-11-15 | Stop reason: SDUPTHER

## 2021-01-14 RX ORDER — NITROGLYCERIN 0.4 MG/1
TABLET SUBLINGUAL
COMMUNITY
Start: 2021-01-08 | End: 2021-07-08 | Stop reason: ALTCHOICE

## 2021-01-14 RX ORDER — ROSUVASTATIN CALCIUM 40 MG/1
40 TABLET, COATED ORAL NIGHTLY
COMMUNITY
Start: 2021-01-08 | End: 2021-11-15 | Stop reason: SDUPTHER

## 2021-01-14 RX ORDER — ASPIRIN 81 MG/1
81 TABLET, CHEWABLE ORAL DAILY
COMMUNITY
Start: 2021-01-09 | End: 2022-03-29 | Stop reason: ALTCHOICE

## 2021-01-14 RX ORDER — PANTOPRAZOLE SODIUM 40 MG/1
40 TABLET, DELAYED RELEASE ORAL
COMMUNITY
Start: 2021-01-09 | End: 2021-07-08 | Stop reason: ALTCHOICE

## 2021-01-14 RX ORDER — METOPROLOL SUCCINATE 25 MG/1
25 TABLET, EXTENDED RELEASE ORAL DAILY
COMMUNITY
Start: 2021-01-09 | End: 2021-11-15 | Stop reason: SDUPTHER

## 2021-01-14 ASSESSMENT — PATIENT HEALTH QUESTIONNAIRE - PHQ9
SUM OF ALL RESPONSES TO PHQ QUESTIONS 1-9: 0
SUM OF ALL RESPONSES TO PHQ QUESTIONS 1-9: 0
SUM OF ALL RESPONSES TO PHQ9 QUESTIONS 1 & 2: 0

## 2021-01-14 NOTE — PROGRESS NOTES
Post-Discharge Transitional Care Management Services or Hospital Follow Up      Ang Jose Carlos   YOB: 1940    Date of Office Visit:  1/14/2021  Date of Hospital Admission: 12/25/20  Date of Hospital Discharge: 1/1/21  Readmission Risk Score(high >=14%.  Medium >=10%):Readmission Risk Score: 13      Care management risk score Rising risk (score 2-5) and Complex Care (Scores >=6): 2     Non face to face  following discharge, date last encounter closed (first attempt may have been earlier): 1/2/2021 11:51 AM 1/2/2021 11:51 AM    Call initiated 2 business days of discharge: Yes     Patient Active Problem List   Diagnosis    Type 2 diabetes mellitus with hyperglycemia, without long-term current use of insulin ([facility] [address]    Astigmatism, regular    Background diabetic retinopathy ([facility] [address]    Essential hypertension    Eczema    Generalized osteoarthritis    Hyperlipidemia    Hypertensive retinopathy of both eyes    Nonsmoker    Pseudophakia    Systolic murmur    Temporary cerebral vascular dysfunction    Pneumonia due to COVID-19 virus    Acute hypoxemic respiratory failure (HCC)    Elevated C-reactive protein (CRP)       Allergies   Allergen Reactions    Atorvastatin      Severe myopathy    Codeine        [id number]    Printed on:01/14/21 1018   Medication Information                      amLODIPine (NORVASC) 10 MG tablet  take 1 tablet by mouth once daily             carvedilol (COREG) 6.25 MG tablet  Take 1 tablet by mouth 2 times daily             Cholecalciferol (VITAMIN D3) 25 MCG (1000 UT) TABS  Take 1 tablet by mouth daily             glipiZIDE (GLUCOTROL) 5 MG tablet  Take 1 tablet by mouth 2 times daily (before meals)             lisinopril-hydroCHLOROthiazide (PRINZIDE;ZESTORETIC) 20-25 MG per tablet  take 1 tablet by mouth once daily metFORMIN (GLUCOPHAGE) 1000 MG tablet  Take 1 tablet by mouth 2 times daily (with meals)             Omega-3 Fatty Acids (FISH OIL) 1000 MG CAPS  Take 1 capsule by mouth 3 times daily                   Medications marked \"taking\" at this time  Outpatient Medications Marked as Taking for the 1/14/21 encounter (Virtual Visit) with Brett Pratt MD   Medication Sig Dispense Refill    metFORMIN (GLUCOPHAGE) 1000 MG tablet Take 1 tablet by mouth 2 times daily (with meals) 60 tablet 11    lisinopril-hydroCHLOROthiazide (PRINZIDE;ZESTORETIC) 20-25 MG per tablet take 1 tablet by mouth once daily 30 tablet 11    glipiZIDE (GLUCOTROL) 5 MG tablet Take 1 tablet by mouth 2 times daily (before meals) 60 tablet 11    amLODIPine (NORVASC) 10 MG tablet take 1 tablet by mouth once daily 30 tablet 11    Omega-3 Fatty Acids (FISH OIL) 1000 MG CAPS Take 1 capsule by mouth 3 times daily (Patient taking differently: Take 1,000 mg by mouth 2 times daily ) 90 capsule 11    Cholecalciferol (VITAMIN D3) 25 MCG (1000 UT) TABS Take 1 tablet by mouth daily 90 tablet 1        Medications patient taking as of now reconciled against medications ordered at time of hospital discharge: Yes    Chief Complaint   Patient presents with    Follow-Up from Hospital       HPI    Inpatient course: Discharge summary reviewed- see chart. Interval history/Current status: improved   She stopped using oxygen on her own accord and does not feel short winded   she does not report any dizziness or difficulty in activities of daily living   She indicates that she does not need any other assistance or medication refills       Review of Systems    There were no vitals filed for this visit. There is no height or weight on file to calculate BMI.    Wt Readings from Last 3 Encounters:   12/27/20 185 lb 14.4 oz (84.3 kg)   02/12/20 192 lb 3.2 oz (87.2 kg)   01/02/20 197 lb 3.2 oz (89.4 kg)     BP Readings from Last 3 Encounters:   12/31/20 120/66 02/12/20 (!) 138/95   01/02/20 133/77       Physical Exam        Assessment/Plan:  1.  Pneumonia due to COVID-19 virus    - MA DISCHARGE MEDS RECONCILED W/ CURRENT OUTPATIENT MED LIST        Medical Decision Making: moderate complexity

## 2021-01-18 ENCOUNTER — VIRTUAL VISIT (OUTPATIENT)
Dept: PULMONOLOGY | Age: 81
End: 2021-01-18
Payer: MEDICARE

## 2021-01-18 DIAGNOSIS — J96.01 ACUTE HYPOXEMIC RESPIRATORY FAILURE (HCC): ICD-10-CM

## 2021-01-18 DIAGNOSIS — U07.1 PNEUMONIA DUE TO COVID-19 VIRUS: Primary | ICD-10-CM

## 2021-01-18 DIAGNOSIS — J12.82 PNEUMONIA DUE TO COVID-19 VIRUS: Primary | ICD-10-CM

## 2021-01-18 PROCEDURE — 99441 PR PHYS/QHP TELEPHONE EVALUATION 5-10 MIN: CPT | Performed by: INTERNAL MEDICINE

## 2021-01-18 NOTE — PROGRESS NOTES
2021    TELEHEALTH EVALUATION -- Audio/Visual (During MZIYX-50 public health emergency)    Patient and physician are located in their individual locations. This is visit is completed via [employer] application / [email or web address] / Telephone     Chief Complaint   Patient presents with    Follow-Up from Hospital     [address]         HPI:    Jodi Pratt (:  1940) has requested an audio/video evaluation for the following concern(s):  Admitted to [address] [address] [address] 2020 discharge 2021 . She was treated with dexamethasone, remdesivir for Covid pneumonia and discharged on home oxygen. Presently patient says she is doing better uses oxygen at night only. She does not have a pulse oximeter to check her saturations. Does not complain of cough or hemoptysis.        Immunization   Immunization History   Administered Date(s) Administered    Influenza A (H5N1) Monovalent Vaccine, Adjuvanted-2019    Influenza Vaccine, unspecified formulation 2012, 10/07/2013, 2014, 2015, 2016    Influenza Virus Vaccine 2012, 10/07/2013, 2014, 2015, 10/07/2018    Influenza, High Dose (Fluzone 65 yrs and older) 2015, 2016, 2017    Influenza, Quadv, IM, PF (6 mo and older Fluzone, Flulaval, Fluarix, and 3 yrs and older Afluria) 2020    Influenza, Quadv, adjuvanted, 65 yrs +, IM, PF (Fluad) 2020    Influenza, Triv, inactivated, subunit, adjuvanted, IM (Fluad 65 yrs and older) 10/27/2018, 2019, 2020    Pneumococcal Conjugate 13-valent (Chaneta Merrillan) 2015, 12/15/2017    Pneumococcal Polysaccharide ([id number]) 2006, 2015, 2018    Td vaccine (adult) 2006    Td, unspecified formulation 2006    Zoster Live (Zostavax) 2011          PAST MEDICAL HISTORY:       Diagnosis Date    Arthritis     Diabetes mellitus ([facility] [address]     Hyperlipidemia 2016    Hypertension empagliflozin (JARDIANCE) 10 MG tablet Take 10 mg by mouth daily 1/9/21  Yes Daljit Provider, MD   metFORMIN (GLUCOPHAGE) 1000 MG tablet Take 1 tablet by mouth 2 times daily (with meals) 1/1/21 1/31/21 Yes Mitra Grimes DO   lisinopril-hydroCHLOROthiazide (PRINZIDE;ZESTORETIC) 20-25 MG per tablet take 1 tablet by mouth once daily 12/7/20  Yes Rachel Lofton MD   glipiZIDE (GLUCOTROL) 5 MG tablet Take 1 tablet by mouth 2 times daily (before meals) 10/8/20  Yes Rachel Lofton MD   amLODIPine (NORVASC) 10 MG tablet take 1 tablet by mouth once daily 9/21/20  Yes Rachel Lofton MD   Omega-3 Fatty Acids (FISH OIL) 1000 MG CAPS Take 1 capsule by mouth 3 times daily  Patient taking differently: Take 1,000 mg by mouth 2 times daily  2/12/20  Yes Rachel Lofton MD   Cholecalciferol (VITAMIN D3) 25 MCG (1000 UT) TABS Take 1 tablet by mouth daily 11/4/19  Yes Rachel Lofton MD            Wt Readings from Last 3 Encounters:   12/27/20 185 lb 14.4 oz (84.3 kg)   02/12/20 192 lb 3.2 oz (87.2 kg)   01/02/20 197 lb 3.2 oz (89.4 kg)           Xr Chest (single View Frontal)    Result Date: 12/29/2020  EXAMINATION: ONE XRAY VIEW OF THE CHEST 12/29/2020 10:46 am COMPARISON: Chest radiograph performed 12/25/2020. HISTORY: ORDERING SYSTEM PROVIDED HISTORY: COVID, hypoxia TECHNOLOGIST PROVIDED HISTORY: COVID, hypoxia Reason for Exam: upr Acuity: Unknown Type of Exam: Unknown FINDINGS: There are scattered areas of ground-glass attenuation most pronounced in the lower lobes. There is no effusion. There is no pneumothorax. The mediastinal structures are unremarkable. The upper abdomen is unremarkable. The extrathoracic soft tissues are unremarkable. Scattered areas of ground-glass attenuation most pronounced in the lower lobes consistent with pneumonia.      Xr Chest Portable    Result Date: 12/25/2020 EXAMINATION: ONE XRAY VIEW OF THE CHEST 12/25/2020 7:48 am COMPARISON: 05/26/2019 HISTORY: ORDERING SYSTEM PROVIDED HISTORY: productive cough, chest tightness TECHNOLOGIST PROVIDED HISTORY: productive cough, chest tightness FINDINGS: No cardiomegaly or interstitial edema was noted. Subtle non consolidating pneumonia appears to be developing in both lower lobes. No pleural effusion was identified. The regional skeleton was unremarkable. No cardiomegaly or interstitial edema. Subtle non consolidating pneumonia appears to be developing in both lower lobes. ASSESSMENT:   Diagnosis Orders   1. Pneumonia due to COVID-19 virus  XR CHEST (2 VW)   2. Acute hypoxemic respiratory failure ([facility] [address]           Plan:   1. Doing well . Using oxygen at night prn   2. Will see her in clinic with xray chest and evaluate continued need for oxygen in 1 month          Requested Prescriptions      No prescriptions requested or ordered in this encounter       There are no discontinued medications. Ginny received counseling on the following healthy behaviors: nutrition, exercise and medication adherence    Patient given educational materials : see patient instruction       Discussed use, benefit, and side effects of prescribed medications. Barriers to medication compliance addressed. All patient questions answered. Pt voiced understanding. An  electronic signature was used to authenticate this note. --Briseida Cardoso MD on 1/18/2021 at 10:30 AM      Please note that this chart was generated using voice recognition Dragon dictation software. Although every effort was made to ensure the accuracy of this automated transcription, some errors in transcription may have occurred.

## 2021-04-20 NOTE — TELEPHONE ENCOUNTER
Patient is calling to check on the refill. Patient has been out of insulin for 3 days now.
murmur     Temporary cerebral vascular dysfunction     Pneumonia due to COVID-19 virus     Acute hypoxemic respiratory failure (HCC)     Elevated C-reactive protein (CRP)

## 2021-05-10 ENCOUNTER — APPOINTMENT (OUTPATIENT)
Dept: CT IMAGING | Age: 81
End: 2021-05-10
Payer: MEDICARE

## 2021-05-10 ENCOUNTER — HOSPITAL ENCOUNTER (EMERGENCY)
Age: 81
Discharge: HOME | End: 2021-05-10
Attending: EMERGENCY MEDICINE
Payer: MEDICARE

## 2021-05-10 VITALS
TEMPERATURE: 97 F | OXYGEN SATURATION: 99 % | DIASTOLIC BLOOD PRESSURE: 74 MMHG | RESPIRATION RATE: 16 BRPM | HEART RATE: 75 BPM | SYSTOLIC BLOOD PRESSURE: 126 MMHG

## 2021-05-10 DIAGNOSIS — S39.012A BACK STRAIN, INITIAL ENCOUNTER: Primary | ICD-10-CM

## 2021-05-10 LAB
ABSOLUTE EOS #: 0.4 K/UL (ref 0–0.44)
ABSOLUTE IMMATURE GRANULOCYTE: <0.03 K/UL (ref 0–0.3)
ABSOLUTE LYMPH #: 2.66 K/UL (ref 1.1–3.7)
ABSOLUTE MONO #: 0.38 K/UL (ref 0.1–1.2)
ANION GAP SERPL CALCULATED.3IONS-SCNC: 10 MMOL/L (ref 9–17)
BASOPHILS # BLD: 0 % (ref 0–2)
BASOPHILS ABSOLUTE: 0.03 K/UL (ref 0–0.2)
BUN BLDV-MCNC: 23 MG/DL (ref 8–23)
BUN/CREAT BLD: ABNORMAL (ref 9–20)
CALCIUM SERPL-MCNC: 9.9 MG/DL (ref 8.6–10.4)
CHLORIDE BLD-SCNC: 101 MMOL/L (ref 98–107)
CO2: 25 MMOL/L (ref 20–31)
CREAT SERPL-MCNC: 0.97 MG/DL (ref 0.5–0.9)
DIFFERENTIAL TYPE: ABNORMAL
EOSINOPHILS RELATIVE PERCENT: 5 % (ref 1–4)
GFR AFRICAN AMERICAN: >60 ML/MIN
GFR NON-AFRICAN AMERICAN: 55 ML/MIN
GFR SERPL CREATININE-BSD FRML MDRD: ABNORMAL ML/MIN/{1.73_M2}
GFR SERPL CREATININE-BSD FRML MDRD: ABNORMAL ML/MIN/{1.73_M2}
GLUCOSE BLD-MCNC: 199 MG/DL (ref 70–99)
HCT VFR BLD CALC: 40.3 % (ref 36.3–47.1)
HEMOGLOBIN: 12.5 G/DL (ref 11.9–15.1)
IMMATURE GRANULOCYTES: 0 %
INR BLD: 1
LYMPHOCYTES # BLD: 36 % (ref 24–43)
MCH RBC QN AUTO: 27.3 PG (ref 25.2–33.5)
MCHC RBC AUTO-ENTMCNC: 31 G/DL (ref 28.4–34.8)
MCV RBC AUTO: 88 FL (ref 82.6–102.9)
MONOCYTES # BLD: 5 % (ref 3–12)
NRBC AUTOMATED: 0 PER 100 WBC
PARTIAL THROMBOPLASTIN TIME: 22.3 SEC (ref 20.5–30.5)
PDW BLD-RTO: 15.6 % (ref 11.8–14.4)
PLATELET # BLD: 285 K/UL (ref 138–453)
PLATELET ESTIMATE: ABNORMAL
PMV BLD AUTO: 10.9 FL (ref 8.1–13.5)
POTASSIUM SERPL-SCNC: 3.8 MMOL/L (ref 3.7–5.3)
PROTHROMBIN TIME: 10.7 SEC (ref 9.1–12.3)
RBC # BLD: 4.58 M/UL (ref 3.95–5.11)
RBC # BLD: ABNORMAL 10*6/UL
SEG NEUTROPHILS: 54 % (ref 36–65)
SEGMENTED NEUTROPHILS ABSOLUTE COUNT: 3.93 K/UL (ref 1.5–8.1)
SODIUM BLD-SCNC: 136 MMOL/L (ref 135–144)
WBC # BLD: 7.4 K/UL (ref 3.5–11.3)
WBC # BLD: ABNORMAL 10*3/UL

## 2021-05-10 PROCEDURE — 85730 THROMBOPLASTIN TIME PARTIAL: CPT

## 2021-05-10 PROCEDURE — 6370000000 HC RX 637 (ALT 250 FOR IP): Performed by: STUDENT IN AN ORGANIZED HEALTH CARE EDUCATION/TRAINING PROGRAM

## 2021-05-10 PROCEDURE — 6360000002 HC RX W HCPCS: Performed by: STUDENT IN AN ORGANIZED HEALTH CARE EDUCATION/TRAINING PROGRAM

## 2021-05-10 PROCEDURE — 99284 EMERGENCY DEPT VISIT MOD MDM: CPT

## 2021-05-10 PROCEDURE — 3209999900 CT LUMBAR SPINE TRAUMA RECONSTRUCTION

## 2021-05-10 PROCEDURE — 74176 CT ABD & PELVIS W/O CONTRAST: CPT

## 2021-05-10 PROCEDURE — 85610 PROTHROMBIN TIME: CPT

## 2021-05-10 PROCEDURE — 96374 THER/PROPH/DIAG INJ IV PUSH: CPT

## 2021-05-10 PROCEDURE — 85025 COMPLETE CBC W/AUTO DIFF WBC: CPT

## 2021-05-10 PROCEDURE — 80048 BASIC METABOLIC PNL TOTAL CA: CPT

## 2021-05-10 RX ORDER — FENTANYL CITRATE 50 UG/ML
25 INJECTION, SOLUTION INTRAMUSCULAR; INTRAVENOUS ONCE
Status: COMPLETED | OUTPATIENT
Start: 2021-05-10 | End: 2021-05-10

## 2021-05-10 RX ORDER — LIDOCAINE 4 G/G
1 PATCH TOPICAL ONCE
Status: DISCONTINUED | OUTPATIENT
Start: 2021-05-10 | End: 2021-05-10 | Stop reason: HOSPADM

## 2021-05-10 RX ORDER — LIDOCAINE 4 G/G
1 PATCH TOPICAL ONCE
Qty: 10 PATCH | Refills: 0 | Status: SHIPPED | OUTPATIENT
Start: 2021-05-10 | End: 2021-05-10

## 2021-05-10 RX ADMIN — FENTANYL CITRATE 25 MCG: 50 INJECTION, SOLUTION INTRAMUSCULAR; INTRAVENOUS at 07:15

## 2021-05-10 ASSESSMENT — ENCOUNTER SYMPTOMS
BACK PAIN: 1
NAUSEA: 0
ABDOMINAL PAIN: 0
VOMITING: 0
COUGH: 0
SHORTNESS OF BREATH: 0
DIARRHEA: 0

## 2021-05-10 ASSESSMENT — PAIN DESCRIPTION - ORIENTATION: ORIENTATION: RIGHT

## 2021-05-10 ASSESSMENT — PAIN DESCRIPTION - PAIN TYPE: TYPE: ACUTE PAIN

## 2021-05-10 ASSESSMENT — PAIN SCALES - GENERAL: PAINLEVEL_OUTOF10: 5

## 2021-05-10 ASSESSMENT — PAIN DESCRIPTION - LOCATION: LOCATION: BACK;LEG

## 2021-05-10 NOTE — ED PROVIDER NOTES
[facility] ED  Emergency Department Encounter  Emergency Medicine Resident     Pt Name: Manju Spencer  MRN: [id number]  [place] 1940  Date of evaluation: 5/10/21  PCP:  MD Juan Kam       Chief Complaint   Patient presents with    Back Pain     right     Leg Pain     right       HISTORY OFPRESENT ILLNESS  (Location/Symptom, Timing/Onset, Context/Setting, Quality, Duration, Modifying Alee Rhiannon.)      Manju Spencer is a 78 y.o. female who presents with complaints of low back pain, radiating down right leg. Patient with past medical history of hyperlipidemia, hypertension, diabetes. Patient states that starting Saturday night she began having low back pain which felt like it worsening sharp stabbing pains down her right leg which ended at her knee. She denies any trauma or falls. She states she is never had back pain like this before. She took some Tylenol, used some cream and this did help a little but she continues to have pain. She states that she is unable to lay flat. She denies loss of bowel or bladder function, history of malignancy, difficulty ambulating, weakness, saddle anesthesia, fever, recent instrumentation or any history of back surgery. She denies abdominal pain, nausea, vomiting, chest pain, shortness of breath. PAST MEDICAL / SURGICAL / SOCIAL / FAMILY HISTORY      has a past medical history of Arthritis, Diabetes mellitus ([address] Hyperlipidemia, Hypertension, Hypokalemia with normal acid-base balance, Pneumonia due to COVID-19 virus, and Type 2 diabetes mellitus with hyperosmolarity not at goal [facility]). has a past surgical history that includes Hysterectomy; Hysterectomy, total abdominal; Tubal ligation; and eye surgery.      Social History     Socioeconomic History    Marital status:      Spouse name: Not on file    Number of children: Not on file    Years of education: Not on file    Highest education level: Not on file   Occupational History    Not on file   Social Needs    Financial resource strain: Not on file    Food insecurity     Worry: Not on file     Inability: Not on file    Transportation needs     Medical: Not on file     Non-medical: Not on file   Tobacco Use    Smoking status: Never Smoker    Smokeless tobacco: Never Used   Substance and Sexual Activity    Alcohol use: No    Drug use: No    Sexual activity: Not on file   Lifestyle    Physical activity     Days per week: Not on file     Minutes per session: Not on file    Stress: Not on file   Relationships    Social connections     Talks on phone: Not on file     Gets together: Not on file     Attends [employer] service: Not on file     Active member of club or organization: Not on file     Attends meetings of clubs or organizations: Not on file     Relationship status: Not on file    Intimate partner violence     Fear of current or ex partner: Not on file     Emotionally abused: Not on file     Physically abused: Not on file     Forced sexual activity: Not on file   Other Topics Concern    Not on file   Social History Narrative    Not on file       History reviewed. No pertinent family history. Allergies:  Atorvastatin and Codeine    Home Medications:  Prior to Admission medications    Medication Sig Start Date End Date Taking?  Authorizing Provider   lidocaine 4 % external patch Place 1 patch onto the skin once for 1 dose 5/10/21 5/10/21 Yes Maggie Hylton DO   insulin glargine (LANTUS;BASAGLAR) 100 UNIT/ML injection pen Inject 18 Units into the skin daily 4/20/21   Barbara Jimenez MD   ticagrelor (BRILINTA) 90 MG TABS tablet Take 90 mg by mouth every 12 hours 1/8/21   Historical Provider, MD   lisinopril (PRINIVIL;ZESTRIL) 20 MG tablet take 1 tablet by mouth once daily 1/8/21   Historical Provider, MD   metoprolol succinate (TOPROL XL) 25 MG extended release tablet Take 25 mg by mouth daily 1/9/21   Historical Provider, MD   nitroGLYCERIN (NITROSTAT) 0.4 MG SL tablet place 1 tablet under the tongue if needed every 5 minutes for chest pain 1/8/21   Historical Provider, MD   pantoprazole (PROTONIX) 40 MG tablet Take 40 mg by mouth every morning (before breakfast) 1/9/21   Historical Provider, MD   rosuvastatin (CRESTOR) 40 MG tablet Take 40 mg by mouth nightly 1/8/21   Historical Provider, MD   aspirin 81 MG chewable tablet Take 81 mg by mouth daily 1/9/21   Historical Provider, MD   rivaroxaban (XARELTO) 10 MG TABS tablet Take 10 mg by mouth daily 1/9/21   Historical Provider, MD   empagliflozin (JARDIANCE) 10 MG tablet Take 10 mg by mouth daily 1/9/21   Historical Provider, MD   metFORMIN (GLUCOPHAGE) 1000 MG tablet Take 1 tablet by mouth 2 times daily (with meals) 1/1/21 1/31/21  Andra Farr DO   lisinopril-hydroCHLOROthiazide (PRINZIDE;ZESTORETIC) 20-25 MG per tablet take 1 tablet by mouth once daily 12/7/20   Miranda Gilman MD   glipiZIDE (GLUCOTROL) 5 MG tablet Take 1 tablet by mouth 2 times daily (before meals) 10/8/20   Miranda Gilman MD   amLODIPine (NORVASC) 10 MG tablet take 1 tablet by mouth once daily 9/21/20   Miranda Gilman MD   Omega-3 Fatty Acids (FISH OIL) 1000 MG CAPS Take 1 capsule by mouth 3 times daily  Patient taking differently: Take 1,000 mg by mouth 2 times daily  2/12/20   Miranda Gilman MD   Cholecalciferol (VITAMIN D3) 25 MCG (1000 UT) TABS Take 1 tablet by mouth daily 11/4/19   Miranda Gilman MD       REVIEW OFSYSTEMS    (2-9 systems for level 4, 10 or more for level 5)      Review of Systems   Constitutional: Negative for activity change, appetite change, chills, fatigue and fever. HENT: Negative for congestion. Respiratory: Negative for cough and shortness of breath. Cardiovascular: Negative for chest pain and leg swelling. Gastrointestinal: Negative for abdominal pain, diarrhea, nausea and vomiting. Genitourinary: Negative for dysuria and frequency. Musculoskeletal: Positive for back pain.  Negative for myalgias. Skin: Negative for rash and wound. Neurological: Negative for dizziness, syncope, weakness, numbness and headaches. PHYSICAL EXAM   (up to 7 for level 4, 8 or more forlevel 5)      INITIAL VITALS:   ED Triage Vitals [05/10/21 0543]   BP Temp Temp Source Pulse Resp SpO2 Height Weight   -- 97 °F (36.1 °C) Skin -- -- -- -- --       Physical Exam  Vitals signs and nursing note reviewed. Constitutional:       General: She is not in acute distress. Appearance: Normal appearance. She is well-developed. She is not diaphoretic. HENT:      Head: Normocephalic and atraumatic. Nose: Nose normal.   Eyes:      Conjunctiva/sclera: Conjunctivae normal.   Neck:      Musculoskeletal: Normal range of motion and neck supple. Cardiovascular:      Rate and Rhythm: Normal rate and regular rhythm. Heart sounds: Normal heart sounds. Pulmonary:      Effort: Pulmonary effort is normal. No respiratory distress. Breath sounds: Normal breath sounds. No wheezing or rales. Abdominal:      General: There is no distension. Palpations: Abdomen is soft. Tenderness: There is no abdominal tenderness. There is no guarding. Comments: No pulsatile mass   Musculoskeletal: Normal range of motion. General: No swelling or tenderness. Comments: No midline cervical, thoracic tenderness, does have midline tenderness palpation, no palpable step-offs or deformities, no paraspinal tenderness   Skin:     General: Skin is warm and dry. Neurological:      Mental Status: She is alert and oriented to person, place, and time. Mental status is at baseline. Sensory: No sensory deficit. Motor: No weakness. Deep Tendon Reflexes: Reflexes normal.   Psychiatric:         Behavior: Behavior normal.         Thought Content:  Thought content normal.         DIFFERENTIAL  DIAGNOSIS     PLAN (LABS / IMAGING / EKG):  Orders Placed This Encounter   Procedures    CT LUMBAR SPINE TRAUMA RECONSTRUCTION    CT ABDOMEN PELVIS WO CONTRAST Additional Contrast? None    CBC WITH AUTO DIFFERENTIAL    BASIC METABOLIC PANEL    PROTIME-INR    APTT       MEDICATIONS ORDERED:  Orders Placed This Encounter   Medications    fentaNYL (SUBLIMAZE) injection 25 mcg    DISCONTD: iopamidol (ISOVUE-370) 76 % injection 75 mL    DISCONTD: lidocaine 4 % external patch 1 patch    lidocaine 4 % external patch     Sig: Place 1 patch onto the skin once for 1 dose     Dispense:  10 patch     Refill:  0           Initial MDM/Plan/ED COURSE:    78 y.o. female who presents with complaints of low back pain, radiating down right leg. On exam patient is sitting upright, no acute distress. Vital signs are within normal limits. On physical exam abdomen is soft, nontender, nondistended with no pulsatile mass. Cardiac regular rate and rhythm. Lungs clear to auscultation bilaterally. With no midline cervical or thoracic tenderness, patient does have midline lumbar tenderness with no step-offs or deformities. 5 out of 5 strength in bilateral upper and lower extremities, sensation intact in bilateral lower extremities. Patellar DTRs 2+ and equal bilaterally. Able to ambulate without difficulty. In care everywhere patient does have CT abdomen pelvis but it does not comment on aorta and we are unable to pull up the imaging studies. We will plan for CTA abdomen pelvis to evaluate aorta. Also plan for CT lumbar spine given midline tenderness, concerns for possible compression fracture. Will obtain basic labs. ED Course as of May 10 1346   Mon May 10, 2021   0815 Patient's IV blew x2. We will plan for CT abdomen pelvis without contrast, and if evidence of AAA we will plan for new IV and scan with contrast.    [LW]   0836 CT abdomen pelvis unremarkable. [LW]      ED Course User Index  [LW] Nick Linker, DO     Impression is likely disc bulge causing compression of the nerve root.   Patient has no red flag symptoms at images are provided for review. Dose modulation, iterative reconstruction, and/or weight based adjustment of the mA/kV was utilized to reduce the radiation dose to as low as reasonably achievable. COMPARISON: None. HISTORY: ORDERING SYSTEM PROVIDED HISTORY: back pain, no recent imaging with aorta TECHNOLOGIST PROVIDED HISTORY: back pain, no recent imaging with aorta Reason for Exam: back pain, no recent imaging with aorta Acuity: Unknown Type of Exam: Unknown FINDINGS: Lower Chest: There is scarring and atelectasis in the lung bases. The visualized cardiac structures are unremarkable. Organs: The liver and spleen normal size and overall attenuation. There is a stone in the gallbladder. The pancreas and adrenal glands are unremarkable. The kidneys are without obstructive uropathy. There are simple renal cysts bilaterally the largest being on the left measuring 6.9 x 6.2 cm. The ureters are not dilated. The urinary bladder is unremarkable. GI/Bowel: The stomach is contracted and otherwise unremarkable. Loops of small bowel are normal in caliber without evidence for obstruction. The colon contains air and fecal residue. There are scattered uncomplicated diverticula. The appendix is normal.  There is no intraperitoneal free air or free fluid. Pelvis: The uterus is been removed. Peritoneum/Retroperitoneum: The psoas muscles are symmetric. The abdominal aorta is normal in caliber. The inferior vena cava is unremarkable. There is no retroperitoneal or mesenteric adenopathy. Bones/Soft Tissues: The extra-abdominal soft tissues are unremarkable. There is no acute osseous abnormality. No acute abdominal or pelvic abnormality. Uncomplicated colonic diverticulosis. Cholelithiasis. Bilateral simple renal cysts.      Ct Lumbar Spine Trauma Reconstruction    Result Date: 5/10/2021  EXAMINATION: CT OF THE LUMBAR SPINE WITHOUT CONTRAST  5/10/2021 TECHNIQUE: CT of the lumbar spine was performed without the administration of intravenous contrast. Multiplanar reformatted images are provided for review. Dose modulation, iterative reconstruction, and/or weight based adjustment of the mA/kV was utilized to reduce the radiation dose to as low as reasonably achievable. COMPARISON: None HISTORY: ORDERING SYSTEM PROVIDED HISTORY: midline back pain, radiating down right leg TECHNOLOGIST PROVIDED HISTORY: midline back pain, radiating down right leg Reason for Exam: midline back pain, radiating down right leg; midline back pain, radiating down right leg Acuity: Unknown Type of Exam: Unknown FINDINGS: BONES/ALIGNMENT: Vertebral body heights maintained. No acute fracture. No destructive lesions. Schmorl's nodes noted of L4 superior and inferior endplates. Lumbar lordosis maintained. Anterolisthesis of L4 upon L5 measuring approximately 5 mm anterolisthesis of L5 upon S1 measuring approximately 6 mm. DEGENERATIVE CHANGES: Moderate disc height loss at L4-5 and mild disc height loss at L5-S1. There appear to be disc bulges at L4-5 and L5-S1. Significant hypertrophic facet changes at L4-5 and L5-S1 with moderate bilateral neural foraminal narrowing. SOFT TISSUES/RETROPERITONEUM: No paraspinal mass is seen. Incompletely visualized simple appearing renal cysts bilaterally. No acute fracture or malalignment of the lumbar spine. Significant hypertrophic facet changes at L4-5 and L5-S1 with moderate bilateral neural foraminal narrowing. Anterolisthesis of L4 upon L5 and L5 upon at S1 measuring approximately 4 mm and 6 mm, respectively. Within the limits of technique, there appear to be significant disc bulges at L4-5 and L5-S1. PROCEDURES:  None    CONSULTS:  None    CRITICAL CARE:  Please see attending note    FINAL IMPRESSION      1.  Back strain, initial encounter          DISPOSITION / PLAN     DISPOSITION : Decision to discharge      PATIENT REFERRED TO:  [facility]  [address] [facility] [address]  [phone]  Go to   If symptoms worsen    Ruta Goldberg, MD [address]  [phone]    In 3 days      Jesus Lee DO  [address] [place] [address],  [address]  [facility] [place] [facility] [address] [address]  [phone]    Call   As needed      DISCHARGE MEDICATIONS:  Discharge Medication List as of 5/10/2021  8:49 AM      START taking these medications    Details   lidocaine 4 % external patch Place 1 patch onto the skin once for 1 dose, Transdermal, ONCE Starting Mon 5/10/2021, For 1 dose, Disp-10 patch, R-0, Print             Gayatri Chery DO  Emergency Medicine Resident    (Please note that portions of this note were completed with a voice recognition program.Efforts were made to edit the dictations but occasionally words are mis-transcribed.)        Gayatri Chery DO  Resident  05/10/21 3253

## 2021-05-10 NOTE — ED PROVIDER NOTES
[address] [facility]  FACULTY HANDOFF       Handoff taken on the following patient from prior Attending Physician:  Pt Name: Manju Spencer  PCP:  Jeison Gomez MD    Attestation  I was available and discussed any additional care issues that arose and coordinated the management plans with the resident(s) caring for the patient during my duty period. Any areas of disagreement with resident's documentation of care or procedures are noted on the chart. I was personally present for the key portions of any/all procedures during my duty period. I have documented in the chart those procedures where I was not present during the key portions.          [facility]       Chief Complaint   Patient presents with    Back Pain     right     Leg Pain     right         CURRENT MEDICATIONS     Previous Medications  Previous Medications    AMLODIPINE (NORVASC) 10 MG TABLET    take 1 tablet by mouth once daily    ASPIRIN 81 MG CHEWABLE TABLET    Take 81 mg by mouth daily    CHOLECALCIFEROL (VITAMIN D3) 25 MCG (1000 UT) TABS    Take 1 tablet by mouth daily    EMPAGLIFLOZIN (JARDIANCE) 10 MG TABLET    Take 10 mg by mouth daily    GLIPIZIDE (GLUCOTROL) 5 MG TABLET    Take 1 tablet by mouth 2 times daily (before meals)    INSULIN GLARGINE (LANTUS;BASAGLAR) 100 UNIT/ML INJECTION PEN    Inject 18 Units into the skin daily    LISINOPRIL (PRINIVIL;ZESTRIL) 20 MG TABLET    take 1 tablet by mouth once daily    LISINOPRIL-HYDROCHLOROTHIAZIDE (PRINZIDE;ZESTORETIC) 20-25 MG PER TABLET    take 1 tablet by mouth once daily    METFORMIN (GLUCOPHAGE) 1000 MG TABLET    Take 1 tablet by mouth 2 times daily (with meals)    METOPROLOL SUCCINATE (TOPROL XL) 25 MG EXTENDED RELEASE TABLET    Take 25 mg by mouth daily    NITROGLYCERIN (NITROSTAT) 0.4 MG SL TABLET    place 1 tablet under the tongue if needed every 5 minutes for chest pain    OMEGA-3 FATTY ACIDS (FISH OIL) 1000 MG CAPS    Take 1 capsule by mouth 3 times daily PANTOPRAZOLE (PROTONIX) 40 MG TABLET    Take 40 mg by mouth every morning (before breakfast)    RIVAROXABAN (XARELTO) 10 MG TABS TABLET    Take 10 mg by mouth daily    ROSUVASTATIN (CRESTOR) 40 MG TABLET    Take 40 mg by mouth nightly    TICAGRELOR (BRILINTA) 90 MG TABS TABLET    Take 90 mg by mouth every 12 hours       Encounter Medications  Orders Placed This Encounter   Medications    fentaNYL (SUBLIMAZE) injection 25 mcg       ALLERGIES     is allergic to atorvastatin and codeine. RECENT VITALS:   Temp: 97 °F (36.1 °C),  Pulse: 75, Resp: 16, BP: 126/74    RADIOLOGY:   CTA ABDOMEN PELVIS W WO CONTRAST    (Results Pending)   CT LUMBAR SPINE TRAUMA RECONSTRUCTION    (Results Pending)       LABS:  Labs Reviewed   CBC WITH AUTO DIFFERENTIAL - Abnormal; Notable for the following components:       Result Value    RDW 15.6 (*)     Eosinophils % 5 (*)     All other components within normal limits   BASIC METABOLIC PANEL - Abnormal; Notable for the following components:    Glucose 199 (*)     CREATININE 0.97 (*)     GFR Non- 55 (*)     All other components within normal limits   PROTIME-INR   APTT           PLAN/ TASKS OUTSTANDING           (Please note that portions of this note were completed with a voice recognition program.  Efforts were made to edit the dictations but occasionally words are mis-transcribed.)    Wyman MD, F.A.C.E.P.   Attending Emergency Physician       Rey Garduno MD  05/10/21 6932

## 2021-05-10 NOTE — ED PROVIDER NOTES
[facility] [facility]  Emergency Department  Faculty Attestation       I performed a history and physical examination of the patient and discussed management with the resident. I reviewed the residents note and agree with the documented findings including all diagnostic interpretations and plan of care. Any areas of disagreement are noted on the chart. I was personally present for the key portions of any procedures. I have documented in the chart those procedures where I was not present during the key portions. I have reviewed the emergency nurses triage note. I agree with the chief complaint, past medical history, past surgical history, allergies, medications, social and family history as documented unless otherwise noted below. Documentation of the HPI, Physical Exam and Medical Decision Making performed by scribsapphire is based on my personal performance of the HPI, PE and MDM. For Physician Assistant/ Nurse Practitioner cases/documentation I have personally evaluated this patient and have completed at least one if not all key elements of the E/M (history, physical exam, and MDM). Additional findings are as noted. Pertinent Comments     Primary Care Physician: Eugene Ordoñez MD    ED Triage Vitals   BP Temp Temp Source Pulse Resp SpO2 Height Weight   05/10/21 0637 05/10/21 0543 05/10/21 0543 05/10/21 7402 05/10/21 0637 05/10/21 0637 -- --   126/74 97 °F (36.1 °C) Skin 75 16 99 %          History/Physical: This is a 78 y.o. female who presents to the Emergency Department with complaint of back pain that started on Saturday. Patient states that started after going taking oral.  It is in the right lower side going down her leg. She has no bowel bladder incontinence. No nausea or vomiting. No abdominal pain. No history of aortic problems, no falls. But is on blood thinners    On exam, patient is well-appearing, but in no acute distress. Normocephalic atraumatic.   Heart sounds regular lungs

## 2021-07-08 ENCOUNTER — OFFICE VISIT (OUTPATIENT)
Dept: INTERNAL MEDICINE | Age: 81
End: 2021-07-08
Payer: MEDICARE

## 2021-07-08 VITALS
HEART RATE: 65 BPM | SYSTOLIC BLOOD PRESSURE: 133 MMHG | DIASTOLIC BLOOD PRESSURE: 74 MMHG | WEIGHT: 177 LBS | BODY MASS INDEX: 30.22 KG/M2 | HEIGHT: 64 IN

## 2021-07-08 DIAGNOSIS — I10 ESSENTIAL HYPERTENSION: ICD-10-CM

## 2021-07-08 DIAGNOSIS — U09.9 POST-COVID SYNDROME: ICD-10-CM

## 2021-07-08 DIAGNOSIS — E11.65 TYPE 2 DIABETES MELLITUS WITH HYPERGLYCEMIA, WITHOUT LONG-TERM CURRENT USE OF INSULIN (HCC): ICD-10-CM

## 2021-07-08 DIAGNOSIS — I25.10 CAD IN NATIVE ARTERY: ICD-10-CM

## 2021-07-08 DIAGNOSIS — Z13.220 SCREENING FOR HYPERLIPIDEMIA: Primary | ICD-10-CM

## 2021-07-08 LAB — HBA1C MFR BLD: 8.3 %

## 2021-07-08 PROCEDURE — 1123F ACP DISCUSS/DSCN MKR DOCD: CPT | Performed by: INTERNAL MEDICINE

## 2021-07-08 PROCEDURE — 83036 HEMOGLOBIN GLYCOSYLATED A1C: CPT | Performed by: INTERNAL MEDICINE

## 2021-07-08 PROCEDURE — 1090F PRES/ABSN URINE INCON ASSESS: CPT | Performed by: INTERNAL MEDICINE

## 2021-07-08 PROCEDURE — G8399 PT W/DXA RESULTS DOCUMENT: HCPCS | Performed by: INTERNAL MEDICINE

## 2021-07-08 PROCEDURE — 99214 OFFICE O/P EST MOD 30 MIN: CPT | Performed by: INTERNAL MEDICINE

## 2021-07-08 PROCEDURE — 4040F PNEUMOC VAC/ADMIN/RCVD: CPT | Performed by: INTERNAL MEDICINE

## 2021-07-08 PROCEDURE — 1036F TOBACCO NON-USER: CPT | Performed by: INTERNAL MEDICINE

## 2021-07-08 PROCEDURE — G8417 CALC BMI ABV UP PARAM F/U: HCPCS | Performed by: INTERNAL MEDICINE

## 2021-07-08 PROCEDURE — G8427 DOCREV CUR MEDS BY ELIG CLIN: HCPCS | Performed by: INTERNAL MEDICINE

## 2021-07-08 PROCEDURE — 3052F HG A1C>EQUAL 8.0%<EQUAL 9.0%: CPT | Performed by: INTERNAL MEDICINE

## 2021-07-08 PROCEDURE — 99211 OFF/OP EST MAY X REQ PHY/QHP: CPT | Performed by: INTERNAL MEDICINE

## 2021-07-08 SDOH — ECONOMIC STABILITY: FOOD INSECURITY: WITHIN THE PAST 12 MONTHS, YOU WORRIED THAT YOUR FOOD WOULD RUN OUT BEFORE YOU GOT MONEY TO BUY MORE.: NEVER TRUE

## 2021-07-08 SDOH — ECONOMIC STABILITY: FOOD INSECURITY: WITHIN THE PAST 12 MONTHS, THE FOOD YOU BOUGHT JUST DIDN'T LAST AND YOU DIDN'T HAVE MONEY TO GET MORE.: NEVER TRUE

## 2021-07-08 ASSESSMENT — SOCIAL DETERMINANTS OF HEALTH (SDOH): HOW HARD IS IT FOR YOU TO PAY FOR THE VERY BASICS LIKE FOOD, HOUSING, MEDICAL CARE, AND HEATING?: NOT HARD AT ALL

## 2021-07-08 NOTE — PATIENT INSTRUCTIONS
Medications e-scribe to pharmacy of pt's choice. An After Visit Summary was printed and given to the patient.   ADRIEN

## 2021-07-08 NOTE — PROGRESS NOTES
[facility]      Date of Patient's Visit: 7/8/2021    Progress note    Patient Care Team:  Morgan Tain MD as PCP - General (Internal Medicine)  Morgan Tian MD as PCP - [facility]  Randy Gibson MD as Consulting Physician (Pulmonary Disease)      CHIEF COMPLAINT  Chief Complaint   Patient presents with    Hypertension    Health Maintenance     A1c running, labs pended       Elizabeth Flores is a 78 y.o. female who presents for f/u for chronic medical problems     1. Uncontrolled DM : hb a1c worsened while on decadarone during COVID in dec 2020 . She was started on insulin 18 u in the hospital   Other medications that she is on are glipizide 5 gm bid  She is not taking jardiance, metformin due to cost and intolerance    2. Hypertension : controlled     3. COVID pneumonia : not on oxygen anymore , has good exercise tolerance, she walks her dog everyday . Not on xeralto    4. CAD s/p OUSMANE in jan 2021 at [address] : stable , on aspirin and brilinta        ROS  All other review of systems negative, except for those noted. Review of Systems    Past Medical History:   Diagnosis Date    Arthritis     Diabetes mellitus ([facility] [address]     Hyperlipidemia 11/9/2016    Hypertension     Hypokalemia with normal acid-base balance 9/13/2016    Pneumonia due to COVID-19 virus 12/25/2020    Type 2 diabetes mellitus with hyperosmolarity not at goal [facility]) 9/13/2016       Past Surgical History:   Procedure Laterality Date    EYE SURGERY      HYSTERECTOMY      HYSTERECTOMY, TOTAL ABDOMINAL      TUBAL LIGATION         No family history on file.     Social History     Socioeconomic History    Marital status:      Spouse name: None    Number of children: None    Years of education: None    Highest education level: None   Occupational History    None   Tobacco Use    Smoking status: Never Smoker    Smokeless tobacco: Never Used   Vaping Use  Vaping Use: Never used   Substance and Sexual Activity    Alcohol use: No    Drug use: No    Sexual activity: None   Other Topics Concern    None   Social History Narrative    None     Social Determinants of Health     Financial Resource Strain: Low Risk     Difficulty of Paying Living Expenses: Not hard at all   Food Insecurity: No Food Insecurity    Worried About Running Out of Food in the Last Year: Never true    Charito of Food in the Last Year: Never true   Transportation Needs:     Lack of Transportation (Medical):      Lack of Transportation (Non-Medical):    Physical Activity:     Days of Exercise per Week:     Minutes of Exercise per Session:    Stress:     Feeling of Stress :    Social Connections:     Frequency of Communication with Friends and Family:     Frequency of Social Gatherings with Friends and Family:     Attends Baptism Services:     Active Member of Clubs or Organizations:     Attends Club or Organization Meetings:     Marital Status:    Intimate Partner Violence:     Fear of Current or Ex-Partner:     Emotionally Abused:     Physically Abused:     Sexually Abused:        Current Outpatient Medications   Medication Sig Dispense Refill    SITagliptin (JANUVIA) 100 MG tablet Take 1 tablet by mouth daily 90 tablet 3    insulin glargine (LANTUS;BASAGLAR) 100 UNIT/ML injection pen Inject 18 Units into the skin daily 5 pen 11    ticagrelor (BRILINTA) 90 MG TABS tablet Take 90 mg by mouth every 12 hours      lisinopril (PRINIVIL;ZESTRIL) 20 MG tablet take 1 tablet by mouth once daily      rosuvastatin (CRESTOR) 40 MG tablet Take 40 mg by mouth nightly      aspirin 81 MG chewable tablet Take 81 mg by mouth daily      amLODIPine (NORVASC) 10 MG tablet take 1 tablet by mouth once daily 30 tablet 11    Omega-3 Fatty Acids (FISH OIL) 1000 MG CAPS Take 1 capsule by mouth 3 times daily (Patient taking differently: Take 1,000 mg by mouth 2 times daily ) 90 capsule 11    Cholecalciferol (VITAMIN D3) 25 MCG (1000 UT) TABS Take 1 tablet by mouth daily 90 tablet 1    metoprolol succinate (TOPROL XL) 25 MG extended release tablet Take 25 mg by mouth daily (Patient not taking: Reported on 7/8/2021)       No current facility-administered medications for this visit. Allergies   Allergen Reactions    Atorvastatin      Severe myopathy    Codeine        PHYSICAL EXAM:   Vital Signs:   /74   Pulse 65   Ht 5' 4\" (1.626 m)   Wt 177 lb (80.3 kg)   BMI 30.38 kg/m²     BP Readings from Last 3 Encounters:   07/08/21 133/74   05/10/21 126/74   12/31/20 120/66        Wt Readings from Last 3 Encounters:   07/08/21 177 lb (80.3 kg)   12/27/20 185 lb 14.4 oz (84.3 kg)   02/12/20 192 lb 3.2 oz (87.2 kg)       Physical Exam  Cardiovascular:      Rate and Rhythm: Normal rate and regular rhythm. Pulmonary:      Effort: Pulmonary effort is normal.   Abdominal:      General: Abdomen is flat. Musculoskeletal:         General: Normal range of motion. Skin:     General: Skin is warm. Neurological:      General: No focal deficit present. Mental Status: She is alert. Psychiatric:         Mood and Affect: Mood normal.         Body mass index is 30.38 kg/m².     RESULTS    Lab Findings    CBC:   Lab Results   Component Value Date    WBC 7.4 05/10/2021    HGB 12.5 05/10/2021     05/10/2021     BMP:   Lab Results   Component Value Date     05/10/2021    K 3.8 05/10/2021     05/10/2021    CO2 25 05/10/2021    BUN 23 05/10/2021    CREATININE 0.97 05/10/2021    GLUCOSE 199 05/10/2021     HEMOGLOBIN A1C:   Lab Results   Component Value Date    LABA1C 8.3 07/08/2021     MICROALBUMIN URINE:   Lab Results   Component Value Date    MICROALBUR <12 06/19/2018     FASTING LIPID PANEL:   Lab Results   Component Value Date    CHOL 196 10/06/2017    HDL 44 10/06/2017    TRIG 121 10/06/2017     Lab Results   Component Value Date    LDLCHOLESTEROL 128 10/06/2017     LIVER PROFILE: Lab Results   Component Value Date    ALT 8 12/25/2020    AST 21 12/25/2020    PROT 7.3 12/25/2020    BILITOT 0.26 12/25/2020    BILIDIR 0.09 12/25/2020    LABALBU 3.0 12/27/2020      THYROID FUNCTION: No results found for: TSH   URINE ANALYSIS: No results found for: [address]    1. Screening for hyperlipidemia      2. Type 2 diabetes mellitus with hyperglycemia, without long-term current use of insulin (HCC)    - POCT glycosylated hemoglobin (Hb A1C)  - SITagliptin (JANUVIA) 100 MG tablet; Take 1 tablet by mouth daily  Dispense: 90 tablet; Refill: 3    3. Essential hypertension      4. Post-COVID syndrome      5. CAD in native artery    PLAN :   Add januvia , stop jardiance, glipizide and metformin   glipizide is stopped on account of risk of hypoglycemia with insulin in elderly female . Metformin is stopped on account of intolerance to this medication  jardiance is stopped on account of insurance restrictions and cost     Medication list requested from the pharmacy            Follow up Instructions:    Return in about 2 months (around 9/8/2021). 1. Reviewed prior labs and health maintenance. 2. Discussed use, benefit, and side effects of prescribed medications. Barriers to medication compliance addressed. All patient questions answered. Pt voiced understanding.      3. Patient given educational materials - see patient instructions      MD RAVEN Ely  Attending Physician, [address] [place] [address], Internal Medicine Residency Program  [address] [place] [address]  7/8/2021, 12:28 PM

## 2021-07-09 ENCOUNTER — PATIENT MESSAGE (OUTPATIENT)
Dept: INTERNAL MEDICINE | Age: 81
End: 2021-07-09

## 2021-07-09 DIAGNOSIS — E11.65 TYPE 2 DIABETES MELLITUS WITH HYPERGLYCEMIA, WITHOUT LONG-TERM CURRENT USE OF INSULIN (HCC): ICD-10-CM

## 2021-07-09 NOTE — TELEPHONE ENCOUNTER
PC to [address] [place] [address] to Lg Varghese. Januvia is sent for a 90 day supply. It would cost patient $47 per month if it were sent as a 30 day supply versus 90 day supply. Lg Varghese thinks the patient is currently in donut hole for insurance deductible. [employer] message sent to patient inquiring if doing a 30 day supply instead of a 90 day supply is more financially manageable.

## 2021-07-09 NOTE — TELEPHONE ENCOUNTER
From: Trisha Juarez  To: Shawnee Fajardo MD  Sent: 7/9/2021 10:11 AM EDT  Subject: Prescription Question    I'm unable to pay 141.00 for Anna Cos is there a way, that I can get partial at a time?

## 2021-09-13 ENCOUNTER — OFFICE VISIT (OUTPATIENT)
Dept: INTERNAL MEDICINE | Age: 81
End: 2021-09-13
Payer: MEDICARE

## 2021-09-13 VITALS
DIASTOLIC BLOOD PRESSURE: 85 MMHG | BODY MASS INDEX: 30.22 KG/M2 | WEIGHT: 177 LBS | HEART RATE: 72 BPM | HEIGHT: 64 IN | SYSTOLIC BLOOD PRESSURE: 150 MMHG

## 2021-09-13 DIAGNOSIS — I10 UNCONTROLLED HYPERTENSION: Primary | ICD-10-CM

## 2021-09-13 DIAGNOSIS — Z13.220 SCREENING FOR HYPERLIPIDEMIA: ICD-10-CM

## 2021-09-13 DIAGNOSIS — M54.16 LUMBAR RADICULOPATHY: ICD-10-CM

## 2021-09-13 DIAGNOSIS — E11.65 TYPE 2 DIABETES MELLITUS WITH HYPERGLYCEMIA, WITHOUT LONG-TERM CURRENT USE OF INSULIN (HCC): ICD-10-CM

## 2021-09-13 PROCEDURE — 99211 OFF/OP EST MAY X REQ PHY/QHP: CPT | Performed by: INTERNAL MEDICINE

## 2021-09-13 PROCEDURE — G8399 PT W/DXA RESULTS DOCUMENT: HCPCS | Performed by: INTERNAL MEDICINE

## 2021-09-13 PROCEDURE — 1123F ACP DISCUSS/DSCN MKR DOCD: CPT | Performed by: INTERNAL MEDICINE

## 2021-09-13 PROCEDURE — G8417 CALC BMI ABV UP PARAM F/U: HCPCS | Performed by: INTERNAL MEDICINE

## 2021-09-13 PROCEDURE — 1036F TOBACCO NON-USER: CPT | Performed by: INTERNAL MEDICINE

## 2021-09-13 PROCEDURE — 99213 OFFICE O/P EST LOW 20 MIN: CPT | Performed by: INTERNAL MEDICINE

## 2021-09-13 PROCEDURE — 1090F PRES/ABSN URINE INCON ASSESS: CPT | Performed by: INTERNAL MEDICINE

## 2021-09-13 PROCEDURE — G8427 DOCREV CUR MEDS BY ELIG CLIN: HCPCS | Performed by: INTERNAL MEDICINE

## 2021-09-13 PROCEDURE — 3052F HG A1C>EQUAL 8.0%<EQUAL 9.0%: CPT | Performed by: INTERNAL MEDICINE

## 2021-09-13 PROCEDURE — 4040F PNEUMOC VAC/ADMIN/RCVD: CPT | Performed by: INTERNAL MEDICINE

## 2021-09-13 RX ORDER — MEDICAL SUPPLY, MISCELLANEOUS
1 EACH MISCELLANEOUS DAILY
Qty: 1 EACH | Refills: 0 | Status: SHIPPED | OUTPATIENT
Start: 2021-09-13

## 2021-09-13 NOTE — PATIENT INSTRUCTIONS
A printed script for Durable Medical Equipment was ordered for the patient. The script was faxed to [facility]- [address], [address] [place] [address]  P: [phone] F: [phone] . An After Visit Summary was printed and given to the patient.   CB

## 2021-09-13 NOTE — PROGRESS NOTES
[facility]      Date of Patient's Visit: 9/13/2021    Progress note    Patient Care Team:  Albino Ortega MD as PCP - General (Internal Medicine)  Albino Ortega MD as PCP - [facility]  Arturo Mejia MD as Consulting Physician (Pulmonary Disease)      CHIEF COMPLAINT  Chief Complaint   Patient presents with    Hypertension    Diabetes    Health Maintenance     labs pended.  Back Pain     lower back pain       SUBJECTIVE  Ginny Sam is a 78 y.o. female who presents f/u on  Hypertension and DM   She forgot her medication list at home and does not know what she takes     She is also very anxious today as she cannot find her `s  license and feels that she may have left it in the cab. C/o lumbar pain that radiates down the thighs b/l , improves with walking     ROS  All other review of systems negative, except for those noted. Review of Systems    Past Medical History:   Diagnosis Date    Arthritis     Diabetes mellitus ([address]     Hyperlipidemia 11/9/2016    Hypertension     Hypokalemia with normal acid-base balance 9/13/2016    Pneumonia due to COVID-19 virus 12/25/2020    Type 2 diabetes mellitus with hyperosmolarity not at goal [facility]) 9/13/2016       Past Surgical History:   Procedure Laterality Date    EYE SURGERY      HYSTERECTOMY      HYSTERECTOMY, TOTAL ABDOMINAL      TUBAL LIGATION         No family history on file.     Social History     Socioeconomic History    Marital status:      Spouse name: None    Number of children: None    Years of education: None    Highest education level: None   Occupational History    None   Tobacco Use    Smoking status: Never Smoker    Smokeless tobacco: Never Used   Vaping Use    Vaping Use: Never used   Substance and Sexual Activity    Alcohol use: No    Drug use: No    Sexual activity: None   Other Topics Concern    None   Social History Narrative    None     Social Determinants of Health     Financial Resource Strain: Low Risk     Difficulty of Paying Living Expenses: Not hard at all   Food Insecurity: No Food Insecurity    Worried About Running Out of Food in the Last Year: Never true    Charito of Food in the Last Year: Never true   Transportation Needs:     Lack of Transportation (Medical):      Lack of Transportation (Non-Medical):    Physical Activity:     Days of Exercise per Week:     Minutes of Exercise per Session:    Stress:     Feeling of Stress :    Social Connections:     Frequency of Communication with Friends and Family:     Frequency of Social Gatherings with Friends and Family:     Attends Restorationism Services:     Active Member of Clubs or Organizations:     Attends Club or Organization Meetings:     Marital Status:    Intimate Partner Violence:     Fear of Current or Ex-Partner:     Emotionally Abused:     Physically Abused:     Sexually Abused:        Current Outpatient Medications   Medication Sig Dispense Refill    Blood Pressure Monitoring (B-D ASSURE BPM/AUTO ARM CUFF) MISC 1 each by Does not apply route daily 1 each 0    insulin glargine (LANTUS;BASAGLAR) 100 UNIT/ML injection pen Inject 18 Units into the skin daily 5 pen 11    ticagrelor (BRILINTA) 90 MG TABS tablet Take 90 mg by mouth every 12 hours      lisinopril (PRINIVIL;ZESTRIL) 20 MG tablet take 1 tablet by mouth once daily      rosuvastatin (CRESTOR) 40 MG tablet Take 40 mg by mouth nightly      aspirin 81 MG chewable tablet Take 81 mg by mouth daily      amLODIPine (NORVASC) 10 MG tablet take 1 tablet by mouth once daily 30 tablet 11    Omega-3 Fatty Acids (FISH OIL) 1000 MG CAPS Take 1 capsule by mouth 3 times daily (Patient taking differently: Take 1,000 mg by mouth 2 times daily ) 90 capsule 11    Cholecalciferol (VITAMIN D3) 25 MCG (1000 UT) TABS Take 1 tablet by mouth daily 90 tablet 1    SITagliptin (JANUVIA) 100 MG tablet Take 1 tablet by mouth daily (Patient not taking: Reported on 9/13/2021) 30 tablet 3    metoprolol succinate (TOPROL XL) 25 MG extended release tablet Take 25 mg by mouth daily (Patient not taking: Reported on 7/8/2021)       No current facility-administered medications for this visit. Allergies   Allergen Reactions    Atorvastatin      Severe myopathy    Codeine        PHYSICAL EXAM:   Vital Signs:   BP (!) 150/85   Pulse 72   Ht 5' 4\" (1.626 m)   Wt 177 lb (80.3 kg)   BMI 30.38 kg/m²     BP Readings from Last 3 Encounters:   09/13/21 (!) 150/85   07/08/21 133/74   05/10/21 126/74        Wt Readings from Last 3 Encounters:   09/13/21 177 lb (80.3 kg)   07/08/21 177 lb (80.3 kg)   12/27/20 185 lb 14.4 oz (84.3 kg)       Physical Exam  Cardiovascular:      Rate and Rhythm: Normal rate and regular rhythm. Pulmonary:      Effort: Pulmonary effort is normal.   Abdominal:      General: Abdomen is flat. Musculoskeletal:         General: Normal range of motion. Skin:     General: Skin is warm. Neurological:      General: No focal deficit present. Mental Status: She is alert. Psychiatric:         Mood and Affect: Mood normal.         Behavior: Behavior normal.         Body mass index is 30.38 kg/m².     RESULTS    Lab Findings    CBC:   Lab Results   Component Value Date    WBC 7.4 05/10/2021    HGB 12.5 05/10/2021     05/10/2021     BMP:   Lab Results   Component Value Date     05/10/2021    K 3.8 05/10/2021     05/10/2021    CO2 25 05/10/2021    BUN 23 05/10/2021    CREATININE 0.97 05/10/2021    GLUCOSE 199 05/10/2021     HEMOGLOBIN A1C:   Lab Results   Component Value Date    LABA1C 8.3 07/08/2021     MICROALBUMIN URINE:   Lab Results   Component Value Date    MICROALBUR <12 06/19/2018     FASTING LIPID PANEL:   Lab Results   Component Value Date    CHOL 196 10/06/2017    HDL 44 10/06/2017    TRIG 121 10/06/2017     Lab Results   Component Value Date    LDLCHOLESTEROL 128 10/06/2017     LIVER PROFILE:   Lab Results   Component Value Date    ALT 8 12/25/2020    AST 21 12/25/2020    PROT 7.3 12/25/2020    BILITOT 0.26 12/25/2020    BILIDIR 0.09 12/25/2020    LABALBU 3.0 12/27/2020      THYROID FUNCTION: No results found for: TSH   URINE ANALYSIS: No results found for: [address]    1. Screening for hyperlipidemia      2. Uncontrolled hypertension    - Blood Pressure Monitoring (B-D ASSURE BPM/AUTO ARM CUFF) MISC; 1 each by Does not apply route daily  Dispense: 1 each; Refill: 0    3. Type 2 diabetes mellitus with hyperglycemia, without long-term current use of insulin ([facility] [address]      4. Lumbar radiculopathy    : positional sciatica : advised to change her shoes, high arch insoles and PT     She will call back to let us know her home bp and DM medications             Follow up Instructions:    1. Return in about 2 months (around 11/13/2021). 2. Reviewed prior labs and health maintenance. 3. Discussed use, benefit, and side effects of prescribed medications. Barriers to medication compliance addressed. All patient questions answered. Pt voiced understanding.      4. Patient given educational materials - see patient instructions      MD RAVEN Gómez  Attending Physician, [address] [place] [address], Internal Medicine Residency Program  [address] [place] [address]  9/13/2021, 1:16 PM

## 2021-10-19 DIAGNOSIS — I10 UNCONTROLLED HYPERTENSION: ICD-10-CM

## 2021-10-19 RX ORDER — AMLODIPINE BESYLATE 10 MG/1
TABLET ORAL
Qty: 30 TABLET | Refills: 11 | Status: SHIPPED | OUTPATIENT
Start: 2021-10-19

## 2021-10-19 NOTE — TELEPHONE ENCOUNTER
E-scribing request for [employer]. Pt has future appt. Health Maintenance   Topic Date Due    Lipid screen  10/06/2018    Annual Wellness Visit (AWV)  10/13/2021    DTaP/Tdap/Td vaccine (1 - Tdap) 01/14/2022 (Originally 1/28/2006)    Shingles Vaccine (2 of 3) 01/14/2022 (Originally 2/26/2011)    Potassium monitoring  05/10/2022    Creatinine monitoring  05/10/2022    DEXA (modify frequency per FRAX score)  Completed    Flu vaccine  Completed    Pneumococcal 65+ years Vaccine  Completed    COVID-19 Vaccine  Completed    Hepatitis A vaccine  Aged Out    Hib vaccine  Aged Out    Meningococcal (ACWY) vaccine  Aged Out             (applicable per patient's age: Cancer Screenings, Depression Screening, Fall Risk Screening, Immunizations)    Hemoglobin A1C (%)   Date Value   07/08/2021 8.3   10/28/2019 9.7   05/06/2019 8.7     Microalb/Crt.  Ratio (mcg/mg creat)   Date Value   06/19/2018 CANNOT BE CALCULATED     LDL Cholesterol (mg/dL)   Date Value   10/06/2017 128     AST (U/L)   Date Value   12/25/2020 21     ALT (U/L)   Date Value   12/25/2020 8     BUN (mg/dL)   Date Value   05/10/2021 23      (goal A1C is < 7)   (goal LDL is <100) need 30-50% reduction from baseline     BP Readings from Last 3 Encounters:   09/13/21 (!) 150/85   07/08/21 133/74   05/10/21 126/74    (goal /80)      All Future Testing planned in [facility]:  Lab Frequency Next Occurrence       Next Visit Date:  Future Appointments   Date Time Provider Pauline Setel   11/15/2021  9:20 AM Jarrod Kirkpatrick MD [facility] CATHLEEN Campuzano            Patient Active Problem List:     Type 2 diabetes mellitus with hyperglycemia, without long-term current use of insulin ([address]     Astigmatism, regular     Background diabetic retinopathy ([address]     Essential hypertension     Eczema     Generalized osteoarthritis     Hyperlipidemia     Hypertensive retinopathy of both eyes     Nonsmoker     Pseudophakia     Systolic murmur     Temporary cerebral vascular

## 2021-11-15 ENCOUNTER — OFFICE VISIT (OUTPATIENT)
Dept: INTERNAL MEDICINE | Age: 81
End: 2021-11-15
Payer: MEDICARE

## 2021-11-15 ENCOUNTER — HOSPITAL ENCOUNTER (OUTPATIENT)
Age: 81
Setting detail: SPECIMEN
Discharge: HOME | End: 2021-11-15

## 2021-11-15 VITALS
SYSTOLIC BLOOD PRESSURE: 129 MMHG | HEIGHT: 64 IN | DIASTOLIC BLOOD PRESSURE: 79 MMHG | HEART RATE: 67 BPM | WEIGHT: 178 LBS | BODY MASS INDEX: 30.39 KG/M2

## 2021-11-15 DIAGNOSIS — Z13.220 SCREENING FOR HYPERLIPIDEMIA: Primary | ICD-10-CM

## 2021-11-15 DIAGNOSIS — Z13.220 SCREENING FOR HYPERLIPIDEMIA: ICD-10-CM

## 2021-11-15 DIAGNOSIS — E11.65 TYPE 2 DIABETES MELLITUS WITH HYPERGLYCEMIA, WITHOUT LONG-TERM CURRENT USE OF INSULIN (HCC): ICD-10-CM

## 2021-11-15 DIAGNOSIS — I10 ESSENTIAL HYPERTENSION: ICD-10-CM

## 2021-11-15 LAB
CHOLESTEROL/HDL RATIO: 4.5
CHOLESTEROL: 206 MG/DL
CHP ED QC CHECK: ABNORMAL
GLUCOSE BLD-MCNC: 150 MG/DL
HBA1C MFR BLD: 7.4 %
HDLC SERPL-MCNC: 46 MG/DL
LDL CHOLESTEROL: 136 MG/DL (ref 0–130)
TRIGL SERPL-MCNC: 119 MG/DL
VLDLC SERPL CALC-MCNC: ABNORMAL MG/DL (ref 1–30)

## 2021-11-15 PROCEDURE — 1090F PRES/ABSN URINE INCON ASSESS: CPT | Performed by: INTERNAL MEDICINE

## 2021-11-15 PROCEDURE — 99211 OFF/OP EST MAY X REQ PHY/QHP: CPT | Performed by: INTERNAL MEDICINE

## 2021-11-15 PROCEDURE — 83036 HEMOGLOBIN GLYCOSYLATED A1C: CPT | Performed by: INTERNAL MEDICINE

## 2021-11-15 PROCEDURE — 82962 GLUCOSE BLOOD TEST: CPT | Performed by: INTERNAL MEDICINE

## 2021-11-15 PROCEDURE — 4040F PNEUMOC VAC/ADMIN/RCVD: CPT | Performed by: INTERNAL MEDICINE

## 2021-11-15 PROCEDURE — G8417 CALC BMI ABV UP PARAM F/U: HCPCS | Performed by: INTERNAL MEDICINE

## 2021-11-15 PROCEDURE — 3051F HG A1C>EQUAL 7.0%<8.0%: CPT | Performed by: INTERNAL MEDICINE

## 2021-11-15 PROCEDURE — G8399 PT W/DXA RESULTS DOCUMENT: HCPCS | Performed by: INTERNAL MEDICINE

## 2021-11-15 PROCEDURE — 1123F ACP DISCUSS/DSCN MKR DOCD: CPT | Performed by: INTERNAL MEDICINE

## 2021-11-15 PROCEDURE — G8427 DOCREV CUR MEDS BY ELIG CLIN: HCPCS | Performed by: INTERNAL MEDICINE

## 2021-11-15 PROCEDURE — 1036F TOBACCO NON-USER: CPT | Performed by: INTERNAL MEDICINE

## 2021-11-15 PROCEDURE — 99213 OFFICE O/P EST LOW 20 MIN: CPT | Performed by: INTERNAL MEDICINE

## 2021-11-15 PROCEDURE — G8484 FLU IMMUNIZE NO ADMIN: HCPCS | Performed by: INTERNAL MEDICINE

## 2021-11-15 RX ORDER — LISINOPRIL 20 MG/1
TABLET ORAL
Qty: 90 TABLET | Refills: 3 | Status: SHIPPED | OUTPATIENT
Start: 2021-11-15 | End: 2022-10-05 | Stop reason: SDUPTHER

## 2021-11-15 RX ORDER — METOPROLOL SUCCINATE 25 MG/1
25 TABLET, EXTENDED RELEASE ORAL DAILY
Qty: 90 TABLET | Refills: 3 | Status: SHIPPED | OUTPATIENT
Start: 2021-11-15

## 2021-11-15 RX ORDER — ROSUVASTATIN CALCIUM 40 MG/1
40 TABLET, COATED ORAL NIGHTLY
Qty: 90 TABLET | Refills: 3 | Status: SHIPPED | OUTPATIENT
Start: 2021-11-15

## 2021-11-15 RX ORDER — INSULIN GLARGINE 100 [IU]/ML
5 INJECTION, SOLUTION SUBCUTANEOUS NIGHTLY
Qty: 5 PEN | Refills: 3 | Status: SHIPPED | OUTPATIENT
Start: 2021-11-15 | End: 2022-06-06

## 2021-11-15 ASSESSMENT — ENCOUNTER SYMPTOMS
GASTROINTESTINAL NEGATIVE: 1
RESPIRATORY NEGATIVE: 1

## 2021-11-15 NOTE — PROGRESS NOTES
[facility]      Date of Patient's Visit: 11/15/2021    Progress note    Patient Care Team:  Robin Almaraz MD as PCP - General (Internal Medicine)  Robin Almaraz MD as PCP - [facility] Empaneled Provider  Kaylie Abbasi MD as Consulting Physician (Pulmonary Disease)      CHIEF COMPLAINT  Chief Complaint   Patient presents with    Hypertension    Health Maintenance       SUBJECTIVE  Lance Willingham is a 78 y.o. female who presents f/u DM and HTN   bp well controlled   DM : HBA1C 7.5 , fbs 174   In our medical records, it appears that she was prescribed 18 units lantus at home, however she has brought her pen with her which shows a setting of 3 units only. When asked she confirms saying,\" I always set my pen at number 3 before giving myself insulin\"       ROS  All other review of systems negative, except for those noted. Review of Systems   Constitutional: Negative. Respiratory: Negative. Cardiovascular: Negative. Gastrointestinal: Negative. Genitourinary: Negative. Musculoskeletal: Negative. Neurological: Negative. Psychiatric/Behavioral: Negative. Past Medical History:   Diagnosis Date    Arthritis     Diabetes mellitus ([address]     Hyperlipidemia 11/9/2016    Hypertension     Hypokalemia with normal acid-base balance 9/13/2016    Pneumonia due to COVID-19 virus 12/25/2020    Type 2 diabetes mellitus with hyperosmolarity not at goal [facility]) 9/13/2016       Past Surgical History:   Procedure Laterality Date    EYE SURGERY      HYSTERECTOMY      HYSTERECTOMY, TOTAL ABDOMINAL      TUBAL LIGATION         No family history on file.     Social History     Socioeconomic History    Marital status:      Spouse name: None    Number of children: None    Years of education: None    Highest education level: None   Occupational History    None   Tobacco Use    Smoking status: Never Smoker    Smokeless tobacco: Never Used   Vaping Use    Vaping Use: Never used   Substance and Sexual Activity    Alcohol use: No    Drug use: No    Sexual activity: None   Other Topics Concern    None   Social History Narrative    None     Social Determinants of Health     Financial Resource Strain: Low Risk     Difficulty of Paying Living Expenses: Not hard at all   Food Insecurity: No Food Insecurity    Worried About Running Out of Food in the Last Year: Never true    Charito of Food in the Last Year: Never true   Transportation Needs:     Lack of Transportation (Medical): Not on file    Lack of Transportation (Non-Medical):  Not on file   Physical Activity:     Days of Exercise per Week: Not on file    Minutes of Exercise per Session: Not on file   Stress:     Feeling of Stress : Not on file   Social Connections:     Frequency of Communication with Friends and Family: Not on file    Frequency of Social Gatherings with Friends and Family: Not on file    Attends [employer] Services: Not on file    Active Member of [address] [place] [address] or Organizations: Not on file    Attends Club or Organization Meetings: Not on file    Marital Status: Not on file   Intimate Partner Violence:     Fear of Current or Ex-Partner: Not on file    Emotionally Abused: Not on file    Physically Abused: Not on file    Sexually Abused: Not on file   Housing Stability:     Unable to Pay for Housing in the Last Year: Not on file    Number of [place] in the Last Year: Not on file    Unstable Housing in the Last Year: Not on file       Current Outpatient Medications   Medication Sig Dispense Refill    lisinopril (PRINIVIL;ZESTRIL) 20 MG tablet take 1 tablet by mouth once daily 90 tablet 3    metoprolol succinate (TOPROL XL) 25 MG extended release tablet Take 1 tablet by mouth daily 90 tablet 3    ticagrelor (BRILINTA) 90 MG TABS tablet Take 1 tablet by mouth every 12 hours 270 tablet 3    rosuvastatin (CRESTOR) 40 MG tablet Take 1 tablet by mouth nightly 90 tablet 3    amLODIPine (NORVASC) 10 MG tablet take 1 tablet by mouth once daily 30 tablet 11    insulin glargine (LANTUS;BASAGLAR) 100 UNIT/ML injection pen Inject 18 Units into the skin daily 5 pen 11    aspirin 81 MG chewable tablet Take 81 mg by mouth daily      Omega-3 Fatty Acids (FISH OIL) 1000 MG CAPS Take 1 capsule by mouth 3 times daily (Patient taking differently: Take 1,000 mg by mouth 2 times daily ) 90 capsule 11    Cholecalciferol (VITAMIN D3) 25 MCG (1000 UT) TABS Take 1 tablet by mouth daily 90 tablet 1    Blood Pressure Monitoring (B-D ASSURE BPM/AUTO ARM CUFF) MISC 1 each by Does not apply route daily 1 each 0    SITagliptin (JANUVIA) 100 MG tablet Take 1 tablet by mouth daily (Patient not taking: Reported on 9/13/2021) 30 tablet 3     No current facility-administered medications for this visit. Allergies   Allergen Reactions    Atorvastatin      Severe myopathy    Codeine        PHYSICAL EXAM:   Vital Signs:   /79   Pulse 67   Ht 5' 4\" (1.626 m)   Wt 178 lb (80.7 kg)   BMI 30.55 kg/m²     BP Readings from Last 3 Encounters:   11/15/21 129/79   09/13/21 (!) 150/85   07/08/21 133/74        Wt Readings from Last 3 Encounters:   11/15/21 178 lb (80.7 kg)   09/13/21 177 lb (80.3 kg)   07/08/21 177 lb (80.3 kg)       Physical Exam  Cardiovascular:      Rate and Rhythm: Normal rate and regular rhythm. Pulmonary:      Effort: Pulmonary effort is normal.   Musculoskeletal:         General: Normal range of motion. Skin:     General: Skin is warm. Neurological:      General: No focal deficit present. Mental Status: She is alert. Psychiatric:         Mood and Affect: Mood normal.         Body mass index is 30.55 kg/m².     RESULTS    Lab Findings    CBC:   Lab Results   Component Value Date    WBC 7.4 05/10/2021    HGB 12.5 05/10/2021     05/10/2021     BMP:   Lab Results   Component Value Date     05/10/2021    K 3.8 05/10/2021     05/10/2021    CO2 25 05/10/2021    BUN 23 05/10/2021    CREATININE 0.97 05/10/2021    GLUCOSE 150 11/15/2021     HEMOGLOBIN A1C:   Lab Results   Component Value Date    LABA1C 7.4 11/15/2021     MICROALBUMIN URINE:   Lab Results   Component Value Date    MICROALBUR <12 06/19/2018     FASTING LIPID PANEL:   Lab Results   Component Value Date    CHOL 196 10/06/2017    HDL 44 10/06/2017    TRIG 121 10/06/2017     Lab Results   Component Value Date    LDLCHOLESTEROL 128 10/06/2017     LIVER PROFILE:   Lab Results   Component Value Date    ALT 8 12/25/2020    AST 21 12/25/2020    PROT 7.3 12/25/2020    BILITOT 0.26 12/25/2020    BILIDIR 0.09 12/25/2020    LABALBU 3.0 12/27/2020      THYROID FUNCTION: No results found for: TSH   URINE ANALYSIS: No results found for: [address]    1. Screening for hyperlipidemia    - Lipid Panel; Future    2. Type 2 diabetes mellitus with hyperglycemia, without long-term current use of insulin (HCC)    - POCT Glucose  - POCT glycosylated hemoglobin (Hb A1C)    3. Essential hypertension  Well controlled    DM with an acceptable control : she only takes 3 units at home   Will change the prescription to prevent errors. Follow up Instructions:    1. Return in about 6 months (around 5/15/2022). 2. Reviewed prior labs and health maintenance. 3. Discussed use, benefit, and side effects of prescribed medications. Barriers to medication compliance addressed. All patient questions answered. Pt voiced understanding.      4. Patient given educational materials - see patient instructions      MD RAVEN Prater  Attending Physician, [facility]   Faculty, Internal Medicine Residency Program  [address] [place] [address]  11/15/2021, 11:16 AM

## 2021-11-15 NOTE — PATIENT INSTRUCTIONS
Medications e-scribe to pharmacy of pt's choice. Laboratory Instructions: Your doctor has ordered blood or urine testing. You can get this testing done at the Lab located on the first floor of the [facility], or at any other [facility]. Please stop at Main Registration, before going to the lab, as you must be registered first.   Please get this lab done before your next visit. Labs given to patient    Patient was put on a wait list and will be contacted to schedule their next follow up appointment once the schedule is available. If the patient is in need of an appointment before their next visit please call the office at [phone]. After Visit Summary  given and reviewed.     GLADIS

## 2021-12-15 ENCOUNTER — TELEPHONE (OUTPATIENT)
Dept: INTERNAL MEDICINE | Age: 81
End: 2021-12-15

## 2021-12-15 NOTE — TELEPHONE ENCOUNTER
Patient was put on the wait list to be scheduled. Left a voicemail for patient to call the office so the patient can be scheduled for a AWV .

## 2022-01-07 ENCOUNTER — VIRTUAL VISIT (OUTPATIENT)
Dept: INTERNAL MEDICINE | Age: 82
End: 2022-01-07
Payer: MEDICARE

## 2022-01-07 DIAGNOSIS — Z00.00 ROUTINE GENERAL MEDICAL EXAMINATION AT A HEALTH CARE FACILITY: Primary | ICD-10-CM

## 2022-01-07 PROCEDURE — 1123F ACP DISCUSS/DSCN MKR DOCD: CPT | Performed by: INTERNAL MEDICINE

## 2022-01-07 PROCEDURE — G8484 FLU IMMUNIZE NO ADMIN: HCPCS | Performed by: INTERNAL MEDICINE

## 2022-01-07 PROCEDURE — 4040F PNEUMOC VAC/ADMIN/RCVD: CPT | Performed by: INTERNAL MEDICINE

## 2022-01-07 PROCEDURE — G0439 PPPS, SUBSEQ VISIT: HCPCS | Performed by: INTERNAL MEDICINE

## 2022-01-07 ASSESSMENT — PATIENT HEALTH QUESTIONNAIRE - PHQ9
SUM OF ALL RESPONSES TO PHQ9 QUESTIONS 1 & 2: 1
1. LITTLE INTEREST OR PLEASURE IN DOING THINGS: 0
2. FEELING DOWN, DEPRESSED OR HOPELESS: 1
SUM OF ALL RESPONSES TO PHQ QUESTIONS 1-9: 1

## 2022-01-07 ASSESSMENT — LIFESTYLE VARIABLES: HOW OFTEN DO YOU HAVE A DRINK CONTAINING ALCOHOL: 0

## 2022-01-07 NOTE — PROGRESS NOTES
Medicare Annual Wellness Visit  Name: Jose Dunn Date: 2022   MRN: [id number] Sex: Female   Age: 78 y.o. Ethnicity: Non- / Non    : 1940 Race: Petty Seymour / Jaye Marroquin is here for [employer]    Screenings for behavioral, psychosocial and functional/safety risks, and cognitive dysfunction are all negative except as indicated below. These results, as well as other patient data from the [address] [address] form, are documented in Flowsheets linked to this Encounter. Allergies   Allergen Reactions    Atorvastatin      Severe myopathy    Codeine        Prior to Visit Medications    Medication Sig Taking?  Authorizing Provider   lisinopril (PRINIVIL;ZESTRIL) 20 MG tablet take 1 tablet by mouth once daily Yes Eugene Ordoñez MD   metoprolol succinate (TOPROL XL) 25 MG extended release tablet Take 1 tablet by mouth daily Yes Eugene Ordoñez MD   ticagrelor (BRILINTA) 90 MG TABS tablet Take 1 tablet by mouth every 12 hours Yes Eugene Ordoñez MD   rosuvastatin (CRESTOR) 40 MG tablet Take 1 tablet by mouth nightly Yes Eugene Ordoñez MD   insulin glargine (LANTUS SOLOSTAR) 100 UNIT/ML injection pen Inject 5 Units into the skin nightly  Patient taking differently: Inject 5 Units into the skin nightly Takes 8 units in morning since 2021, fasting blood sugars typically around 130 Yes Eugene Ordoñez MD   amLODIPine (NORVASC) 10 MG tablet take 1 tablet by mouth once daily Yes Eugene Ordoñez MD   aspirin 81 MG chewable tablet Take 81 mg by mouth daily Yes Historical Provider, MD   Omega-3 Fatty Acids (FISH OIL) 1000 MG CAPS Take 1 capsule by mouth 3 times daily  Patient taking differently: Take 1,000 mg by mouth 2 times daily  Yes Eugene Ordoñez MD   Cholecalciferol (VITAMIN D3) 25 MCG (1000 UT) TABS Take 1 tablet by mouth daily Yes Eugene Ordoñez MD   Blood Pressure Monitoring (B-D ASSURE BPM/AUTO ARM CUFF) MISC 1 each by Does not apply route daily  Patient not taking: Reported on 1/7/2022  Kika Escoto MD   SITagliptin (JANUVIA) 100 MG tablet Take 1 tablet by mouth daily  Patient not taking: Reported on 9/13/2021  Kika Escoto MD       Past Medical History:   Diagnosis Date    Arthritis     Diabetes mellitus ([address]     Hyperlipidemia 11/9/2016    Hypertension     Hypokalemia with normal acid-base balance 9/13/2016    Pneumonia due to COVID-19 virus 12/25/2020    Type 2 diabetes mellitus with hyperosmolarity not at goal [facility]) 9/13/2016       Past Surgical History:   Procedure Laterality Date    EYE SURGERY      HYSTERECTOMY      HYSTERECTOMY, TOTAL ABDOMINAL      TUBAL LIGATION         History reviewed. No pertinent family history. CareTeam (Including outside providers/suppliers regularly involved in providing care):   Patient Care Team:  Kika Escoto MD as PCP - General (Internal Medicine)  Kika Escoto MD as PCP - [facility] Empaneled Provider  Jaime Ivory MD as Consulting Physician (Pulmonary Disease)    Wt Readings from Last 3 Encounters:   11/15/21 178 lb (80.7 kg)   09/13/21 177 lb (80.3 kg)   07/08/21 177 lb (80.3 kg)     There were no vitals filed for this visit. There is no height or weight on file to calculate BMI. Based upon direct observation of the patient, evaluation of cognition reveals remote memory intact, recent memory impaired. Patient's complete Health Risk Assessment and screening values have been reviewed and are found in Flowsheets. The following problems were reviewed today and where indicated follow up appointments were made and/or referrals ordered. Positive Risk Factor Screenings with Interventions:      Cognitive: Words recalled: 2 Words Recalled (had to prompt for watch, unable to recall shanti)  Total Score Interpretation: Positive Mini-Cog  Cognitive Impairment Interventions:  · Patient declines any further evaluation/treatment for cognitive impairment.  States that she is good at making lists and has found that if she writes information down she remembers it better. Pt not concerned with memory at this time. General Health and ACP:  General  In general, how would you say your health is?: Good  In the past 7 days, have you experienced any of the following? New or Increased Pain, New or Increased Fatigue, Loneliness, Social Isolation, Stress or Anger?: (!) Loneliness,Social Isolation (has friends and family to talk to but wishes she had someone home with her)  Do you get the social and emotional support that you need?: Yes  Do you have a Living Will?: Yes  Advance Directives     Power of  Living Will ACP-Advance Directive ACP-Power of     Not on File Not on File Not on File Not on File      General Health Risk Interventions:  · Loneliness: patient declines any further intervention for this issue--pt states she has friends and family she can talk to, she just wishes she had someone in the home to talk to throughout the day.     Health Habits/Nutrition:  Health Habits/Nutrition  Do you exercise for at least 20 minutes 2-3 times per week?: (!) No  Have you lost any weight without trying in the past 3 months?: No  Do you eat only one meal per day?: No  Have you seen the dentist within the past year?: N/A - wear dentures     Health Habits/Nutrition Interventions:  · Inadequate physical activity:  educational materials provided to promote increased physical activity       Personalized Preventive Plan   Current Health Maintenance Status  Immunization History   Administered Date(s) Administered    COVID-19, Anthony Terrell, Primary or Immunocompromised, PF, 100mcg/0.5mL 04/17/2021, 05/15/2021    Influenza A (H5N1) Monovalent Vaccine, Adjuvanted-2013 08/09/2019    Influenza Vaccine, unspecified formulation 01/01/2012, 10/07/2013, 09/04/2014, 09/01/2015, 09/26/2016    Influenza Virus Vaccine 01/01/2012, 10/07/2013, 09/04/2014, 09/01/2015, 10/07/2018    Influenza, High Dose (Fluzone 65 yrs and older) 09/14/2015, 09/26/2016, 08/14/2017    Influenza, Loletta Flatten, IM, PF (6 mo and older Fluzone, Flulaval, Fluarix, and 3 yrs and older Afluria) 08/14/2020    Influenza, Quadv, adjuvanted, 65 yrs +, IM, PF (Fluad) 08/14/2020, 09/09/2021    Influenza, Triv, inactivated, subunit, adjuvanted, IM (Fluad 65 yrs and older) 10/27/2018, 08/09/2019, 08/14/2020    Pneumococcal Conjugate 13-valent (Bashir Grain) 09/14/2015, 12/15/2017    Pneumococcal Polysaccharide ([id number]) 01/27/2006, 09/01/2015, 05/21/2018    Td vaccine (adult) 01/27/2006    Td, unspecified formulation 01/27/2006    Zoster Live (Zostavax) 01/01/2011        Health Maintenance   Topic Date Due    Annual Wellness Visit (AWV)  10/13/2021    COVID-19 Vaccine (3 - Booster for Moderna series) 11/15/2021    Depression Screen  01/14/2022    DTaP/Tdap/Td vaccine (1 - Tdap) 01/14/2022 (Originally 1/28/2006)    Shingles Vaccine (2 of 3) 01/14/2022 (Originally 2/26/2011)    Potassium monitoring  05/10/2022    Creatinine monitoring  05/10/2022    Lipid screen  11/15/2022    DEXA (modify frequency per FRAX score)  Completed    Flu vaccine  Completed    Pneumococcal 65+ years Vaccine  Completed    Hepatitis A vaccine  Aged Out    Hib vaccine  Aged Out    Meningococcal (ACWY) vaccine  Aged Out     Recommendations for [employer] Due: see orders and patient instructions/AVS.  . Recommended screening schedule for the next 5-10 years is provided to the patient in written form: see Patient Instructions/AVS.    Danielle QUIROZ RN, 1/7/2022, performed the documented evaluation under the direct supervision of the attending physician.

## 2022-01-07 NOTE — PATIENT INSTRUCTIONS
Personalized Preventive Plan for Veena Torres - 1/7/2022  Medicare offers a range of preventive health benefits. Some of the tests and screenings are paid in full while other may be subject to a deductible, co-insurance, and/or copay. Some of these benefits include a comprehensive review of your medical history including lifestyle, illnesses that may run in your family, and various assessments and screenings as appropriate. After reviewing your medical record and screening and assessments performed today your provider may have ordered immunizations, labs, imaging, and/or referrals for you. A list of these orders (if applicable) as well as your Preventive Care list are included within your After Visit Summary for your review. Other Preventive Recommendations:    · A preventive eye exam performed by an eye specialist is recommended every 1-2 years to screen for glaucoma; cataracts, macular degeneration, and other eye disorders. · A preventive dental visit is recommended every 6 months. · Try to get at least 150 minutes of exercise per week or 10,000 steps per day on a pedometer . · Order or download the FREE \"Exercise & Physical Activity: Your Everyday Guide\" from [employer]. Call 4-716.898.9913 or search [employer] online. · You need 2424-0487 mg of calcium and 5916-3294 IU of vitamin D per day. It is possible to meet your calcium requirement with diet alone, but a vitamin D supplement is usually necessary to meet this goal.  · When exposed to the sun, use a sunscreen that protects against both UVA and UVB radiation with an SPF of 30 or greater. Reapply every 2 to 3 hours or after sweating, drying off with a towel, or swimming. · Always wear a seat belt when traveling in a car. Always wear a helmet when riding a bicycle or motorcycle. Patient Education        Well Visit, Over 72: Care Instructions  Overview     Well visits can help you stay healthy.  Your doctor has checked your overall health and may have suggested ways to take good care of yourself. Your doctor also may have recommended tests. At home, you can help prevent illness with healthy eating, regular exercise, and other steps. Follow-up care is a key part of your treatment and safety. Be sure to make and go to all appointments, and call your doctor if you are having problems. It's also a good idea to know your test results and keep a list of the medicines you take. How can you care for yourself at home? Get screening tests that you and your doctor decide on. Screening helps find diseases before any symptoms appear. Eat healthy foods. Choose fruits, vegetables, whole grains, protein, and low-fat dairy foods. Limit fat, especially saturated fat. Reduce salt in your diet. Limit alcohol. If you are a man, have no more than 2 drinks a day or 14 drinks a week. If you are a woman, have no more than 1 drink a day or 7 drinks a week. Since alcohol affects older adults differently, you may want to limit alcohol even more. Or you may not want to drink at all. Get at least 30 minutes of exercise on most days of the week. Walking is a good choice. You also may want to do other activities, such as running, swimming, cycling, or playing tennis or team sports. Reach and stay at a healthy weight. This will lower your risk for many problems, such as obesity, diabetes, heart disease, and high blood pressure. Do not smoke. Smoking can make health problems worse. If you need help quitting, talk to your doctor about stop-smoking programs and medicines. These can increase your chances of quitting for good. Care for your mental health. It is easy to get weighed down by worry and stress. Learn strategies to manage stress, like deep breathing and mindfulness, and stay connected with your family and community. If you find you often feel sad or hopeless, talk with your doctor. Treatment can help.   Talk to your doctor about whether you have any risk factors for sexually transmitted infections (STIs). You can help prevent STIs if you wait to have sex with a new partner (or partners) until you've each been tested for STIs. It also helps if you use condoms (male or female condoms) and if you limit your sex partners to one person who only has sex with you. Vaccines are available for some STIs. If you think you may have a problem with alcohol or drug use, talk to your doctor. This includes prescription medicines (such as amphetamines and opioids) and illegal drugs (such as cocaine and methamphetamine). Your doctor can help you figure out what type of treatment is best for you. Protect your skin from too much sun. When you're outdoors from 10 a.m. to 4 p.m., stay in the shade or cover up with clothing and a hat with a wide brim. Wear sunglasses that block UV rays. Even when it's cloudy, put broad-spectrum sunscreen (SPF 30 or higher) on any exposed skin. See a dentist one or two times a year for checkups and to have your teeth cleaned. Wear a seat belt in the car. When should you call for help? Watch closely for changes in your health, and be sure to contact your doctor if you have any problems or symptoms that concern you. Where can you learn more? Go to https://[employer]. org and sign in to your [employer] account. Enter M727 in the [facility] box to learn more about \"Well Visit, Over 65: Care Instructions. \"     If you do not have an account, please click on the \"Sign Up Now\" link. Current as of: October 6, 2021               Content Version: 13.1  © 8788-0500 [employer], [employer]. Care instructions adapted under license by [facility] ([facility]). If you have questions about a medical condition or this instruction, always ask your healthcare professional. [address] any warranty or liability for your use of this information.

## 2022-03-29 ENCOUNTER — OFFICE VISIT (OUTPATIENT)
Dept: INTERNAL MEDICINE | Age: 82
End: 2022-03-29
Payer: MEDICARE

## 2022-03-29 VITALS
BODY MASS INDEX: 32.27 KG/M2 | WEIGHT: 189 LBS | HEART RATE: 62 BPM | TEMPERATURE: 97.4 F | OXYGEN SATURATION: 99 % | DIASTOLIC BLOOD PRESSURE: 76 MMHG | SYSTOLIC BLOOD PRESSURE: 159 MMHG | HEIGHT: 64 IN

## 2022-03-29 DIAGNOSIS — M25.559 HIP PAIN: ICD-10-CM

## 2022-03-29 DIAGNOSIS — Z79.4 TYPE 2 DIABETES MELLITUS WITH OTHER CIRCULATORY COMPLICATION, WITH LONG-TERM CURRENT USE OF INSULIN (HCC): ICD-10-CM

## 2022-03-29 DIAGNOSIS — I10 HYPERTENSION, UNSPECIFIED TYPE: ICD-10-CM

## 2022-03-29 DIAGNOSIS — E11.59 TYPE 2 DIABETES MELLITUS WITH OTHER CIRCULATORY COMPLICATION, WITH LONG-TERM CURRENT USE OF INSULIN (HCC): ICD-10-CM

## 2022-03-29 DIAGNOSIS — Z00.01 ANNUAL VISIT FOR GENERAL ADULT MEDICAL EXAMINATION WITH ABNORMAL FINDINGS: ICD-10-CM

## 2022-03-29 DIAGNOSIS — G89.29 CHRONIC RIGHT-SIDED LOW BACK PAIN WITHOUT SCIATICA: Primary | ICD-10-CM

## 2022-03-29 DIAGNOSIS — M47.9 SPINE DEGENERATION: ICD-10-CM

## 2022-03-29 DIAGNOSIS — M54.50 CHRONIC RIGHT-SIDED LOW BACK PAIN WITHOUT SCIATICA: Primary | ICD-10-CM

## 2022-03-29 LAB — HBA1C MFR BLD: 6.9 %

## 2022-03-29 PROCEDURE — G8399 PT W/DXA RESULTS DOCUMENT: HCPCS | Performed by: INTERNAL MEDICINE

## 2022-03-29 PROCEDURE — 3044F HG A1C LEVEL LT 7.0%: CPT | Performed by: INTERNAL MEDICINE

## 2022-03-29 PROCEDURE — 1123F ACP DISCUSS/DSCN MKR DOCD: CPT | Performed by: INTERNAL MEDICINE

## 2022-03-29 PROCEDURE — 4040F PNEUMOC VAC/ADMIN/RCVD: CPT | Performed by: INTERNAL MEDICINE

## 2022-03-29 PROCEDURE — 1036F TOBACCO NON-USER: CPT | Performed by: INTERNAL MEDICINE

## 2022-03-29 PROCEDURE — 99213 OFFICE O/P EST LOW 20 MIN: CPT | Performed by: INTERNAL MEDICINE

## 2022-03-29 PROCEDURE — 1090F PRES/ABSN URINE INCON ASSESS: CPT | Performed by: INTERNAL MEDICINE

## 2022-03-29 PROCEDURE — 99211 OFF/OP EST MAY X REQ PHY/QHP: CPT | Performed by: INTERNAL MEDICINE

## 2022-03-29 PROCEDURE — G8417 CALC BMI ABV UP PARAM F/U: HCPCS | Performed by: INTERNAL MEDICINE

## 2022-03-29 PROCEDURE — G8427 DOCREV CUR MEDS BY ELIG CLIN: HCPCS | Performed by: INTERNAL MEDICINE

## 2022-03-29 PROCEDURE — 83036 HEMOGLOBIN GLYCOSYLATED A1C: CPT | Performed by: INTERNAL MEDICINE

## 2022-03-29 PROCEDURE — G8484 FLU IMMUNIZE NO ADMIN: HCPCS | Performed by: INTERNAL MEDICINE

## 2022-03-29 RX ORDER — ASPIRIN 81 MG/1
81 TABLET, CHEWABLE ORAL DAILY
Qty: 90 TABLET | Refills: 3 | Status: SHIPPED | OUTPATIENT
Start: 2022-03-29

## 2022-03-29 NOTE — PROGRESS NOTES
[facility]      Date of Patient's Visit: 3/29/2022    Progress note    Patient Care Team:  Barbara Jimenez MD as PCP - General (Internal Medicine)  Brabara Jimenez MD as PCP - [facility] Provider  Albino Myrick MD as Consulting Physician (Pulmonary Disease)      CHIEF COMPLAINT  Chief Complaint   Patient presents with    Annual Exam    Health Maintenance       SUBJECTIVE  Fredricka Mcardle is a 78 y.o. female who presents for f/u :     Back pain and hip pain : worse now causing difficulty ambulating. DM : well controlled on 3 u lantus. She is not on any oral diabetic medications because of intolerance to metformin and cost of other medications   She feels comfortable with insulin and denies and hypoglycemic episodes     Glycated hb 6.9     ROS  All other review of systems negative, except for those noted. Review of Systems    Past Medical History:   Diagnosis Date    Arthritis     Diabetes mellitus ([facility] [address]     Hyperlipidemia 11/9/2016    Hypertension     Hypokalemia with normal acid-base balance 9/13/2016    Pneumonia due to COVID-19 virus 12/25/2020    Type 2 diabetes mellitus with hyperosmolarity not at goal [facility]) 9/13/2016       Past Surgical History:   Procedure Laterality Date    EYE SURGERY      HYSTERECTOMY      HYSTERECTOMY, TOTAL ABDOMINAL      TUBAL LIGATION         No family history on file.     Social History     Socioeconomic History    Marital status:      Spouse name: None    Number of children: None    Years of education: None    Highest education level: None   Occupational History    None   Tobacco Use    Smoking status: Never Smoker    Smokeless tobacco: Never Used   Vaping Use    Vaping Use: Never used   Substance and Sexual Activity    Alcohol use: No    Drug use: No    Sexual activity: None   Other Topics Concern    None   Social History Narrative    None     Social Determinants of Health     Financial Resource Strain: Low Risk     Difficulty of Paying Living Expenses: Not hard at all   Food Insecurity: No Food Insecurity    Worried About Running Out of Food in the Last Year: Never true    Charito of Food in the Last Year: Never true   Transportation Needs:     Lack of Transportation (Medical): Not on file    Lack of Transportation (Non-Medical):  Not on file   Physical Activity:     Days of Exercise per Week: Not on file    Minutes of Exercise per Session: Not on file   Stress:     Feeling of Stress : Not on file   Social Connections:     Frequency of Communication with Friends and Family: Not on file    Frequency of Social Gatherings with Friends and Family: Not on file    Attends Anabaptism Services: Not on file    Active Member of [address] [place] [address] [employer] or Organizations: Not on file    Attends Club or Organization Meetings: Not on file    Marital Status: Not on file   Intimate Partner Violence:     Fear of Current or Ex-Partner: Not on file    Emotionally Abused: Not on file    Physically Abused: Not on file    Sexually Abused: Not on file   Housing Stability:     Unable to Pay for Housing in the Last Year: Not on file    Number of [place] in the Last Year: Not on file    Unstable Housing in the Last Year: Not on file       Current Outpatient Medications   Medication Sig Dispense Refill    aspirin 81 MG chewable tablet Take 1 tablet by mouth daily 90 tablet 3    Handicap Placard MISC by Does not apply route 1 each 0    lisinopril (PRINIVIL;ZESTRIL) 20 MG tablet take 1 tablet by mouth once daily 90 tablet 3    metoprolol succinate (TOPROL XL) 25 MG extended release tablet Take 1 tablet by mouth daily 90 tablet 3    rosuvastatin (CRESTOR) 40 MG tablet Take 1 tablet by mouth nightly 90 tablet 3    insulin glargine (LANTUS SOLOSTAR) 100 UNIT/ML injection pen Inject 5 Units into the skin nightly (Patient taking differently: Inject 5 Units into the skin nightly Takes 8 units in morning since November 2021, fasting blood sugars typically around 130) 5 pen 3    amLODIPine (NORVASC) 10 MG tablet take 1 tablet by mouth once daily 30 tablet 11    Omega-3 Fatty Acids (FISH OIL) 1000 MG CAPS Take 1 capsule by mouth 3 times daily (Patient taking differently: Take 1,000 mg by mouth 2 times daily ) 90 capsule 11    Cholecalciferol (VITAMIN D3) 25 MCG (1000 UT) TABS Take 1 tablet by mouth daily 90 tablet 1    Blood Pressure Monitoring (B-D ASSURE BPM/AUTO ARM CUFF) MISC 1 each by Does not apply route daily (Patient not taking: Reported on 1/7/2022) 1 each 0     No current facility-administered medications for this visit. Allergies   Allergen Reactions    Atorvastatin      Severe myopathy    Codeine        PHYSICAL EXAM:   Vital Signs:   BP (!) 159/76   Pulse 62   Temp 97.4 °F (36.3 °C) (Infrared)   Ht 5' 4\" (1.626 m)   Wt 189 lb (85.7 kg)   SpO2 99%   BMI 32.44 kg/m²     BP Readings from Last 3 Encounters:   03/29/22 (!) 159/76   11/15/21 129/79   09/13/21 (!) 150/85        Wt Readings from Last 3 Encounters:   03/29/22 189 lb (85.7 kg)   11/15/21 178 lb (80.7 kg)   09/13/21 177 lb (80.3 kg)       Physical Exam    Body mass index is 32.44 kg/m².     RESULTS    Lab Findings    CBC:   Lab Results   Component Value Date    WBC 7.4 05/10/2021    HGB 12.5 05/10/2021     05/10/2021     BMP:   Lab Results   Component Value Date     05/10/2021    K 3.8 05/10/2021     05/10/2021    CO2 25 05/10/2021    BUN 23 05/10/2021    CREATININE 0.97 05/10/2021    GLUCOSE 150 11/15/2021     HEMOGLOBIN A1C:   Lab Results   Component Value Date    LABA1C 6.9 03/29/2022     MICROALBUMIN URINE:   Lab Results   Component Value Date    MICROALBUR <12 06/19/2018     FASTING LIPID PANEL:   Lab Results   Component Value Date    CHOL 206 (H) 11/15/2021    HDL 46 11/15/2021    TRIG 119 11/15/2021     Lab Results   Component Value Date    LDLCHOLESTEROL 136 (H) 11/15/2021     LIVER PROFILE:   Lab Results   Component Value Date    ALT 8 12/25/2020    AST 21 12/25/2020    PROT 7.3 12/25/2020    BILITOT 0.26 12/25/2020    BILIDIR 0.09 12/25/2020    LABALBU 3.0 12/27/2020      THYROID FUNCTION: No results found for: T4, TSH   URINE ANALYSIS: No results found for: [address]    1. Chronic right-sided low back pain without sciatica    - AFL - Hermila Shields MD, Orthopedic Surgery, [facility]    2. Spine degeneration    - Handicap Placard MISC; by Does not apply route  Dispense: 1 each; Refill: 0    3. Hip pain    - Handicap Placard MISC; by Does not apply route  Dispense: 1 each; Refill: 0    4. Type 2 diabetes mellitus with other circulatory complication, with long-term current use of insulin (HCC)    - POCT glycosylated hemoglobin (Hb A1C)    5. Annual visit for general adult medical examination with abnormal findings    - CBC with Auto Differential; Future    6. Hypertension, unspecified type    - Basic Metabolic Panel; Future  - CBC with Auto Differential; Future            Follow up Instructions:    1. Return in about 6 months (around 9/29/2022). 2. Reviewed prior labs and health maintenance. 3. Discussed use, benefit, and side effects of prescribed medications. Barriers to medication compliance addressed. All patient questions answered. Pt voiced understanding.      4. Patient given educational materials - see patient instructions      MD RAVEN Olivares  Attending Physician, [address] [place] [address], Internal Medicine Residency Program  [address] [place] [address]  3/29/2022, 2:34 PM

## 2022-03-29 NOTE — PATIENT INSTRUCTIONS
Medications e-scribe to pharmacy of pt's choice. Script for lab given to pt, no fasting required. Pt will get labs done before next appt. Referral for ortho sent to [facility]  they will call pt for appt, copy of referral with number and address given to pt. Pt should call referral number if not heard from within a couple of weeks. An After Visit Summary was printed and given to the patient.   ADRIEN

## 2022-03-30 ENCOUNTER — HOSPITAL ENCOUNTER (OUTPATIENT)
Age: 82
Setting detail: SPECIMEN
Discharge: HOME | End: 2022-03-30

## 2022-03-30 DIAGNOSIS — I10 HYPERTENSION, UNSPECIFIED TYPE: ICD-10-CM

## 2022-03-30 DIAGNOSIS — Z00.01 ANNUAL VISIT FOR GENERAL ADULT MEDICAL EXAMINATION WITH ABNORMAL FINDINGS: ICD-10-CM

## 2022-03-30 LAB
ABSOLUTE EOS #: 0.47 K/UL (ref 0–0.44)
ABSOLUTE IMMATURE GRANULOCYTE: 0.03 K/UL (ref 0–0.3)
ABSOLUTE LYMPH #: 2.82 K/UL (ref 1.1–3.7)
ABSOLUTE MONO #: 0.37 K/UL (ref 0.1–1.2)
ANION GAP SERPL CALCULATED.3IONS-SCNC: 10 MMOL/L (ref 9–17)
BASOPHILS # BLD: 1 % (ref 0–2)
BASOPHILS ABSOLUTE: 0.04 K/UL (ref 0–0.2)
BUN BLDV-MCNC: 25 MG/DL (ref 8–23)
CALCIUM SERPL-MCNC: 9.8 MG/DL (ref 8.6–10.4)
CHLORIDE BLD-SCNC: 108 MMOL/L (ref 98–107)
CO2: 26 MMOL/L (ref 20–31)
CREAT SERPL-MCNC: 1.15 MG/DL (ref 0.5–0.9)
EOSINOPHILS RELATIVE PERCENT: 7 % (ref 1–4)
GFR AFRICAN AMERICAN: 55 ML/MIN
GFR NON-AFRICAN AMERICAN: 45 ML/MIN
GFR SERPL CREATININE-BSD FRML MDRD: ABNORMAL ML/MIN/{1.73_M2}
GLUCOSE BLD-MCNC: 121 MG/DL (ref 70–99)
HCT VFR BLD CALC: 41 % (ref 36.3–47.1)
HEMOGLOBIN: 12.7 G/DL (ref 11.9–15.1)
IMMATURE GRANULOCYTES: 0 %
LYMPHOCYTES # BLD: 42 % (ref 24–43)
MCH RBC QN AUTO: 28.4 PG (ref 25.2–33.5)
MCHC RBC AUTO-ENTMCNC: 31 G/DL (ref 28.4–34.8)
MCV RBC AUTO: 91.7 FL (ref 82.6–102.9)
MONOCYTES # BLD: 6 % (ref 3–12)
NRBC AUTOMATED: 0 PER 100 WBC
PDW BLD-RTO: 17.7 % (ref 11.8–14.4)
PLATELET # BLD: 267 K/UL (ref 138–453)
PMV BLD AUTO: 11.4 FL (ref 8.1–13.5)
POTASSIUM SERPL-SCNC: 4.6 MMOL/L (ref 3.7–5.3)
RBC # BLD: 4.47 M/UL (ref 3.95–5.11)
RBC # BLD: ABNORMAL 10*6/UL
SEG NEUTROPHILS: 44 % (ref 36–65)
SEGMENTED NEUTROPHILS ABSOLUTE COUNT: 3.03 K/UL (ref 1.5–8.1)
SODIUM BLD-SCNC: 144 MMOL/L (ref 135–144)
WBC # BLD: 6.8 K/UL (ref 3.5–11.3)

## 2022-06-06 RX ORDER — INSULIN GLARGINE 100 [IU]/ML
INJECTION, SOLUTION SUBCUTANEOUS
Qty: 5 PEN | Refills: 3 | Status: SHIPPED | OUTPATIENT
Start: 2022-06-06

## 2022-06-06 NOTE — TELEPHONE ENCOUNTER
Request for Lantus Solostar Injection pen. Next Visit Date:  No future appointments. Health Maintenance   Topic Date Due    DTaP/Tdap/Td vaccine (1 - Tdap) 03/29/2023 (Originally 1/28/2006)    Shingles vaccine (2 of 3) 03/29/2023 (Originally 2/26/2011)    Lipids  11/15/2022    Depression Screen  01/07/2023    Annual Wellness Visit (AWV)  01/08/2023    DEXA (modify frequency per FRAX score)  Completed    Flu vaccine  Completed    Pneumococcal 65+ years Vaccine  Completed    COVID-19 Vaccine  Completed    Hepatitis A vaccine  Aged Out    Hib vaccine  Aged Out    Meningococcal (ACWY) vaccine  Aged Out       Hemoglobin A1C (%)   Date Value   03/29/2022 6.9   11/15/2021 7.4   07/08/2021 8.3             ( goal A1C is < 7)   Microalb/Crt.  Ratio (mcg/mg creat)   Date Value   06/19/2018 CANNOT BE CALCULATED     LDL Cholesterol (mg/dL)   Date Value   11/15/2021 136 (H)       (goal LDL is <100)   AST (U/L)   Date Value   12/25/2020 21     ALT (U/L)   Date Value   12/25/2020 8     BUN (mg/dL)   Date Value   03/30/2022 25 (H)     BP Readings from Last 3 Encounters:   03/29/22 (!) 159/76   11/15/21 129/79   09/13/21 (!) 150/85          (goal 120/80)    All Future Testing planned in [facility]  Lab Frequency Next Occurrence         Patient Active Problem List:     Type 2 diabetes mellitus with hyperglycemia, without long-term current use of insulin (HCC)     Astigmatism, regular     Background diabetic retinopathy ([address]     Essential hypertension     Eczema     Generalized osteoarthritis     Hyperlipidemia     Hypertensive retinopathy of both eyes     Nonsmoker     Pseudophakia     Systolic murmur     Temporary cerebral vascular dysfunction     Pneumonia due to COVID-19 virus     Acute hypoxemic respiratory failure (HCC)     Elevated C-reactive protein (CRP)

## 2022-08-15 ENCOUNTER — HOSPITAL ENCOUNTER (EMERGENCY)
Age: 82
Discharge: HOME | End: 2022-08-15
Attending: EMERGENCY MEDICINE
Payer: MEDICARE

## 2022-08-15 ENCOUNTER — APPOINTMENT (OUTPATIENT)
Dept: GENERAL RADIOLOGY | Age: 82
End: 2022-08-15
Payer: MEDICARE

## 2022-08-15 VITALS
HEART RATE: 84 BPM | RESPIRATION RATE: 18 BRPM | WEIGHT: 188 LBS | DIASTOLIC BLOOD PRESSURE: 97 MMHG | BODY MASS INDEX: 32.27 KG/M2 | SYSTOLIC BLOOD PRESSURE: 154 MMHG | TEMPERATURE: 97.9 F | OXYGEN SATURATION: 100 %

## 2022-08-15 DIAGNOSIS — M76.891 HIP FLEXOR TENDONITIS, RIGHT: Primary | ICD-10-CM

## 2022-08-15 PROCEDURE — 99283 EMERGENCY DEPT VISIT LOW MDM: CPT

## 2022-08-15 PROCEDURE — 73502 X-RAY EXAM HIP UNI 2-3 VIEWS: CPT

## 2022-08-15 ASSESSMENT — ENCOUNTER SYMPTOMS
CHEST TIGHTNESS: 0
ABDOMINAL PAIN: 0
NAUSEA: 0
TROUBLE SWALLOWING: 0
COUGH: 0
COLOR CHANGE: 0
BACK PAIN: 0
SHORTNESS OF BREATH: 0
VOMITING: 0
DIARRHEA: 0

## 2022-08-15 ASSESSMENT — PAIN SCALES - GENERAL: PAINLEVEL_OUTOF10: 6

## 2022-08-15 ASSESSMENT — PAIN DESCRIPTION - LOCATION: LOCATION: LEG

## 2022-08-15 ASSESSMENT — PAIN - FUNCTIONAL ASSESSMENT: PAIN_FUNCTIONAL_ASSESSMENT: 0-10

## 2022-08-15 NOTE — DISCHARGE INSTR - COC
Continuity of Care Form    Patient Name: Joseph Gallardo   :  1940  MRN:  [id number]    Admit date:  8/15/2022  Discharge date:  ***    Code Status Order: Prior   Advance Directives:     Admitting Physician:  No admitting provider for patient encounter. PCP: Silvio Guerrero MD    Discharging Nurse: [facility] Unit/Room#:   Discharging Unit Phone Number: ***    Emergency Contact:   Extended Emergency Contact Information  Primary Emergency Contact: nicole teixeira  Home Phone: [phone]  Relation: Child   needed? No  Secondary Emergency Contact: carlitos gutierrez  Home Phone: [phone]  Relation: Child   needed?  No    Past Surgical History:  Past Surgical History:   Procedure Laterality Date    EYE SURGERY      HYSTERECTOMY (CERVIX STATUS UNKNOWN)      HYSTERECTOMY, TOTAL ABDOMINAL (CERVIX REMOVED)      TUBAL LIGATION         Immunization History:   Immunization History   Administered Date(s) Administered    COVID-19, MODERNA BLUE border, Primary or Immunocompromised, (age 12y+), IM, 100 mcg/0.5mL 2021, 05/15/2021, 2021    Influenza A (H5N1) Monovalent Vaccine, Adjuvanted-2019    Influenza Vaccine, unspecified formulation 2012, 10/07/2013, 2014, 2015, 2016    Influenza Virus Vaccine 2012, 10/07/2013, 2014, 2015, 10/07/2018    Influenza, High Dose (Fluzone 65 yrs and older) 2015, 2016, 2017    Influenza, Quadv, IM, PF (6 mo and older Fluzone, Flulaval, Fluarix, and 3 yrs and older Afluria) 2020    Influenza, Quadv, adjuvanted, 65 yrs +, IM, PF (Fluad) 2020, 2021    Influenza, Triv, inactivated, subunit, adjuvanted, IM (Fluad 65 yrs and older) 10/27/2018, 2019, 2020    Pneumococcal Conjugate 13-valent (Ilnohss96) 2015, 12/15/2017    Pneumococcal Polysaccharide (Aebdvhtmd40) 2006, 2015, 2018    Td vaccine (adult) 2006    Td, unspecified formulation 2006    Zoster Live (Zostavax) 2011       Active Problems:  Patient Active Problem List   Diagnosis Code    Type 2 diabetes mellitus with hyperglycemia, without long-term current use of insulin ([facility]) E11.65    Astigmatism, regular H52.229    Background diabetic retinopathy ([facility] [address] E11.3299    Essential hypertension I10    Eczema L30.9    Generalized osteoarthritis M15.9    Hyperlipidemia E78.5    Hypertensive retinopathy of both eyes H35.033    Nonsmoker Z78.9    Pseudophakia U80.4    Systolic murmur Y88.1    Temporary cerebral vascular dysfunction I67.82    Pneumonia due to COVID-19 virus U07.1, J12.82    Acute hypoxemic respiratory failure (HCC) J96.01    Elevated C-reactive protein (CRP) R79.82       Isolation/Infection:   Isolation            No Isolation          Patient Infection Status       Infection Onset Added Last Indicated Last Indicated By Review Planned Expiration Resolved Resolved By    None active    Resolved    COVID-19 20 COVID-19   21     COVID-19 (Rule Out) 20 COVID-19 (Ordered)   20 Rule-Out Test Resulted            Nurse Assessment:  Last Vital Signs: BP (!) 154/97   Pulse 84   Temp 97.9 °F (36.6 °C) (Oral)   Resp 18   Wt 188 lb (85.3 kg)   SpO2 100%   BMI 32.27 kg/m²     Last documented pain score (0-10 scale): Pain Level: 6  Last Weight:   Wt Readings from Last 1 Encounters:   08/15/22 188 lb (85.3 kg)     Mental Status:  {IP PT MENTAL STATUS:19749}    IV Access:  { HEIDE IV ACCESS:031879102}    Nursing Mobility/ADLs:  Walking   {P DME YMKE:969504534}  Transfer  {[facility] DME XRJZ:194506778}  Bathing  {P DME WKHS:151144523}  Dressing  {P DME KNKP:640440060}  Toileting  {P DME RVXH:197106724}  Feeding  {[facility] DME MTQX:907678515}  Med Admin  {[facility] DME GZOK:330082212}  Med Delivery   { HEIDE MED Delivery:184364749}    Wound Care Documentation and Therapy:        Elimination:  Continence: Bowel: {YES / ED:93336}  Bladder: {YES / RC:95919}  Urinary Catheter: {Urinary Catheter:382460734}   Colostomy/Ileostomy/Ileal Conduit: {YES / AM:66216}       Date of Last BM: ***  No intake or output data in the 24 hours ending 08/15/22 1731  No intake/output data recorded.     Safety Concerns:     [employer] Safety Concerns:791858983}    Impairments/Disabilities:      [employer] Impairments/Disabilities:352094121}    Nutrition Therapy:  Current Nutrition Therapy:   [facility] Diet List:493726842}    Routes of Feeding: {CHP DME Other Feedings:878062123}  Liquids: {Slp liquid thickness:63864}  Daily Fluid Restriction: {CHP DME Yes amt example:324379641}  Last Modified Barium Swallow with Video (Video Swallowing Test): {Done Not Done VYXV:042166796}    Treatments at the Time of Hospital Discharge:   Respiratory Treatments: ***  Oxygen Therapy:  {Therapy; copd oxygen:88509}  Ventilator:    { CC Vent EJBP:513834076}    Rehab Therapies: {THERAPEUTIC INTERVENTION:4940098645}  Weight Bearing Status/Restrictions: [facility] Weight Bearin}  Other Medical Equipment (for information only, NOT a DME order):  {EQUIPMENT:587215943}  Other Treatments: ***    Patient's personal belongings (please select all that are sent with patient):  {CHP DME Belongings:152782688}    RN SIGNATURE:  {Esignature:983135621}    CASE MANAGEMENT/SOCIAL WORK SECTION    Inpatient Status Date: ***    Readmission Risk Assessment Score:  Readmission Risk              Risk of Unplanned Readmission:  0           Discharging to Facility/ Agency   Name: [facility]  Address: [address] [address]  Phone: [phone]  Fax:    Dialysis Facility (if applicable)   Name:  Address:  Dialysis Schedule:  Phone:  Fax:    / signature: Electronically signed by Lucillie Merlin, RN on 8/15/22 at 5:46 PM EDT    PHYSICIAN SECTION    Prognosis: Fair    Condition at Discharge: Stable    Rehab Potential (if transferring to Rehab): Good    Recommended Labs or Other Treatments After Discharge: PT follow up    Physician Certification: I certify the above information and transfer of Danny Nolasco  is necessary for the continuing treatment of the diagnosis listed and that she requires [address] for greater 30 days.      Update Admission H&P: No change in H&P    PHYSICIAN SIGNATURE:  Electronically signed by Richard Pulliam DO on 8/15/22 at 5:32 PM EDT

## 2022-08-15 NOTE — CARE COORDINATION
Writer was called to arrange [facility]. and a walker for this patient. [facility]. list is provided to the patient and she chose [facility] from list because they are current with her spouse. Patient asked that the walker order be sent to [facility] [place]. Patient is provided with contact information for both agencies. Patient is provided with the order for the walker as well as the face to face documentation. Patient is informed that [facility] has not accepted her and to call in the am to determine if they can accept her to their service. Patient is also provided with the ED CM  phone number if she has problems with this discharge plan. Patient agrees .

## 2022-08-15 NOTE — DISCHARGE INSTRUCTIONS
You have been seen in the ER today for right leg/groin pain. You likely have inflammation of the hip flexor muscle. You will require PT and OT follow-up, please use a walker and follow-up with your primary care provider. If you begin to experience any symptoms such as chest pain shortness of breath nausea vomiting dizziness drowsiness abdominal pain loss of consciousness or any other symptoms you find concerning please return to the ED for follow-up evaluation. If you have been given medication please take them as prescribed for the pain. Do not take more medication than recommended at any given time. Please follow-up with your primary care provider within 5 to 7 days for continued care.

## 2022-08-15 NOTE — ED PROVIDER NOTES
[facility]     Emergency Department     Faculty Note/ Attestation      Pt Name: Terrie Mendoza                                       MRN: [id number]  Jegfsydni 1940  Date of evaluation: 8/15/2022    Patients PCP:    Sree Lofton MD    Attestation  I performed a history and physical examination of the patient and discussed management with the resident. I reviewed the residents note and agree with the documented findings and plan of care. Any areas of disagreement are noted on the chart. I was personally present for the key portions of any procedures. I have documented in the chart those procedures where I was not present during the key portions. I have reviewed the emergency nurses triage note. I agree with the chief complaint, past medical history, past surgical history, allergies, medications, social and family history as documented unless otherwise noted below. For Physician Assistant/ Nurse Practitioner cases/documentation I have personally evaluated this patient and have completed at least one if not all key elements of the E/M (history, physical exam, and MDM). Additional findings are as noted. Initial Screens:        [place] Coma Scale  Eye Opening: Spontaneous  Best Verbal Response: Oriented  Best Motor Response: Obeys commands  [place] Coma Scale Score: 15    Vitals:    Vitals:    08/15/22 1631   BP: (!) 154/97   Pulse: 84   Resp: 18   Temp: 97.9 °F (36.6 °C)   TempSrc: Oral   SpO2: 100%   Weight: 188 lb (85.3 kg)       CHIEF COMPLAINT       Chief Complaint   Patient presents with    Groin Pain     Right side when walking forward       The pt is a 77 YO F who was seen about a week ago for same at [facility] the pt having pian with walking forward noting that the pain in the groin is coming on with walking only and pain present with flexing hip. The pt has no pain with passive ROM.           EMERGENCY DEPARTMENT COURSE:     -------------------------  BP: (!) 154/97, Temp: 97.9 °F (36.6 °C), Heart Rate: 84, Resp: 18  Physical Exam  Constitutional:       Appearance: She is well-developed. She is not diaphoretic. HENT:      Head: Normocephalic and atraumatic. Right Ear: External ear normal.      Left Ear: External ear normal.   Eyes:      General: No scleral icterus. Right eye: No discharge. Left eye: No discharge. Neck:      Trachea: No tracheal deviation. Pulmonary:      Effort: Pulmonary effort is normal. No respiratory distress. Breath sounds: No stridor. Musculoskeletal:         General: Normal range of motion. Cervical back: Normal range of motion. Skin:     General: Skin is warm and dry. Neurological:      Mental Status: She is alert and oriented to person, place, and time. Coordination: Coordination normal.   Psychiatric:         Behavior: Behavior normal.       Comments  Beverly Cummings was evaluated today and a DME order was entered for a standard walker because she requires this to successfully complete daily living tasks of ambulating. A standard walker is necessary due to the patient's impaired ambulation and mobility restrictions and she can ambulate only by using a walker instead of a lesser assistive device, such as a cane or crutch. The need for this equipment was discussed with the patient and she understands and is in agreement. Signs of pulse deficits no signs of an aneurysm no signs of a bleeding disorder patient has no signs of a hernia symptoms most consistent with hip flexor strain given the patient is 82 difficulty with her ambulation may need a walker and PT OT      Raheem Coronado DO,, DO, RDMS.   Attending Emergency Physician          Raheem Coronado DO  08/15/22 3698

## 2022-08-15 NOTE — ED NOTES
Pt resting on stretcher, RR even and non labored, call light within reach, family at bedside.       Aidan Nj RN  08/15/22 4573

## 2022-08-15 NOTE — ED PROVIDER NOTES
tablet by mouth nightly 11/15/21   Sree Lofton MD   amLODIPine (NORVASC) 10 MG tablet take 1 tablet by mouth once daily 10/19/21   Sree Lofton MD   Blood Pressure Monitoring (B-D ASSURE BPM/AUTO ARM CUFF) MISC 1 each by Does not apply route daily  Patient not taking: Reported on 1/7/2022 9/13/21   Sree Lofton MD   Omega-3 Fatty Acids (FISH OIL) 1000 MG CAPS Take 1 capsule by mouth 3 times daily  Patient taking differently: Take 1,000 mg by mouth 2 times daily  2/12/20   Sree Lofton MD   Cholecalciferol (VITAMIN D3) 25 MCG (1000 UT) TABS Take 1 tablet by mouth daily 11/4/19   Sree Lofton MD       REVIEW OF SYSTEMS    (2-9 systems for level 4, 10 or more for level 5)      Review of Systems   Constitutional:  Negative for chills, fatigue and fever. HENT:  Negative for congestion and trouble swallowing. Eyes:  Negative for visual disturbance. Respiratory:  Negative for cough, chest tightness and shortness of breath. Gastrointestinal:  Negative for abdominal pain, diarrhea, nausea and vomiting. Endocrine: Negative for polyuria. Genitourinary:  Negative for dysuria, flank pain, hematuria and urgency. Musculoskeletal:  Positive for gait problem and myalgias. Negative for back pain. Skin:  Negative for color change. Allergic/Immunologic: Negative for immunocompromised state. Neurological:  Negative for dizziness, syncope, weakness, light-headedness and headaches. PHYSICAL EXAM   (up to 7 for level 4, 8 or more for level 5)      INITIAL VITALS:   BP (!) 154/97   Pulse 84   Temp 97.9 °F (36.6 °C) (Oral)   Resp 18   Wt 188 lb (85.3 kg)   SpO2 100%   BMI 32.27 kg/m²     Physical Exam  Constitutional:       General: She is not in acute distress. Appearance: She is obese. She is not ill-appearing or toxic-appearing. HENT:      Head: Normocephalic and atraumatic. Nose: No congestion.       Mouth/Throat:      Mouth: Mucous membranes are moist.   Eyes: Conjunctiva/sclera: Conjunctivae normal.   Cardiovascular:      Rate and Rhythm: Normal rate and regular rhythm. Pulses: Normal pulses. Heart sounds: Normal heart sounds. No murmur heard. No gallop. Pulmonary:      Effort: Pulmonary effort is normal. No respiratory distress. Breath sounds: Normal breath sounds. No stridor. No wheezing or rhonchi. Chest:      Chest wall: No tenderness. Abdominal:      General: Abdomen is flat. There is no distension. Palpations: There is no mass. Tenderness: no abdominal tenderness There is no guarding or rebound. Musculoskeletal:         General: No swelling, tenderness or deformity. Legs:       Comments: Patient has pain when right hip is flexed against resistance. Patient is able to flex the right hip. Patient is able to stand, ambulate with some difficulty. Skin:     General: Skin is warm. Coloration: Skin is not jaundiced. Findings: No bruising. Neurological:      General: No focal deficit present. Mental Status: She is alert. DIFFERENTIAL  DIAGNOSIS     PLAN (LABS / IMAGING / EKG):  Orders Placed This Encounter   Procedures    XR HIP 2-3 VW W PELVIS RIGHT    Inpatient consult to Home Care Needs    Inpatient consult to Case Management    DME Order for Walker as OP       MEDICATIONS ORDERED:  No orders of the defined types were placed in this encounter. DDX: Hip flexor tendinitis, hernia, right lower quadrant abdominal pain    DIAGNOSTIC RESULTS / EMERGENCY DEPARTMENT COURSE / MDM   LAB RESULTS:  No results found for this visit on 08/15/22. CONSULTS:  IP CONSULT TO HOME CARE NEEDS  IP CONSULT TO CASE MANAGEMENT        Assessment/Plan: 68-year-old female presenting with right-sided groin pain, seen previously at [facility] ER. She was discharged at that time. No progression of symptoms, symptoms still present today.   Patient was given ER return precautions, hemodynamically stable, appropriate for

## 2022-08-15 NOTE — ED TRIAGE NOTES
Pt comes to ED with c/o leg pain. A/o x4 pt states going to an ED a month ago with similar problem but pills did not help, current episode started 3 days ago, able to walk backwards but right side of groin hurts when walking forward. Pt denies chest pain, SOB, and musculoskeletal hx. CMS intact. Pt has pain when lifting right leg upwards against resistance. RR even and non labored, call light within reach. Family at bedside.

## 2022-08-16 ENCOUNTER — TELEPHONE (OUTPATIENT)
Dept: INTERNAL MEDICINE | Age: 82
End: 2022-08-16

## 2022-08-16 NOTE — TELEPHONE ENCOUNTER
PC from [address] from [address] in regards to an verbal order for following for home care PT, OT and skilled nursing.

## 2022-08-16 NOTE — CARE COORDINATION
8/16/2022 0839 call from Wilbert Rodriguez at [address] [place] [address], they can accept patient

## 2022-10-05 ENCOUNTER — OFFICE VISIT (OUTPATIENT)
Dept: INTERNAL MEDICINE | Age: 82
End: 2022-10-05
Payer: MEDICARE

## 2022-10-05 VITALS
HEART RATE: 72 BPM | WEIGHT: 206 LBS | BODY MASS INDEX: 35.17 KG/M2 | HEIGHT: 64 IN | OXYGEN SATURATION: 98 % | TEMPERATURE: 97.3 F | SYSTOLIC BLOOD PRESSURE: 144 MMHG | DIASTOLIC BLOOD PRESSURE: 72 MMHG

## 2022-10-05 DIAGNOSIS — M54.50 CHRONIC BILATERAL LOW BACK PAIN WITHOUT SCIATICA: Primary | ICD-10-CM

## 2022-10-05 DIAGNOSIS — Z79.4 TYPE 2 DIABETES MELLITUS WITH OTHER CIRCULATORY COMPLICATION, WITH LONG-TERM CURRENT USE OF INSULIN (HCC): ICD-10-CM

## 2022-10-05 DIAGNOSIS — G89.29 CHRONIC BILATERAL LOW BACK PAIN WITHOUT SCIATICA: Primary | ICD-10-CM

## 2022-10-05 DIAGNOSIS — I10 HYPERTENSION, UNSPECIFIED TYPE: ICD-10-CM

## 2022-10-05 DIAGNOSIS — E11.59 TYPE 2 DIABETES MELLITUS WITH OTHER CIRCULATORY COMPLICATION, WITH LONG-TERM CURRENT USE OF INSULIN (HCC): ICD-10-CM

## 2022-10-05 DIAGNOSIS — M25.559 HIP PAIN: ICD-10-CM

## 2022-10-05 PROCEDURE — 1036F TOBACCO NON-USER: CPT | Performed by: INTERNAL MEDICINE

## 2022-10-05 PROCEDURE — 99213 OFFICE O/P EST LOW 20 MIN: CPT | Performed by: INTERNAL MEDICINE

## 2022-10-05 PROCEDURE — G8484 FLU IMMUNIZE NO ADMIN: HCPCS | Performed by: INTERNAL MEDICINE

## 2022-10-05 PROCEDURE — G8399 PT W/DXA RESULTS DOCUMENT: HCPCS | Performed by: INTERNAL MEDICINE

## 2022-10-05 PROCEDURE — 1123F ACP DISCUSS/DSCN MKR DOCD: CPT | Performed by: INTERNAL MEDICINE

## 2022-10-05 PROCEDURE — G8427 DOCREV CUR MEDS BY ELIG CLIN: HCPCS | Performed by: INTERNAL MEDICINE

## 2022-10-05 PROCEDURE — 1090F PRES/ABSN URINE INCON ASSESS: CPT | Performed by: INTERNAL MEDICINE

## 2022-10-05 PROCEDURE — 3044F HG A1C LEVEL LT 7.0%: CPT | Performed by: INTERNAL MEDICINE

## 2022-10-05 PROCEDURE — 99211 OFF/OP EST MAY X REQ PHY/QHP: CPT | Performed by: INTERNAL MEDICINE

## 2022-10-05 PROCEDURE — G8417 CALC BMI ABV UP PARAM F/U: HCPCS | Performed by: INTERNAL MEDICINE

## 2022-10-05 RX ORDER — LISINOPRIL 20 MG/1
TABLET ORAL
Qty: 90 TABLET | Refills: 3 | Status: SHIPPED | OUTPATIENT
Start: 2022-10-05

## 2022-10-05 SDOH — ECONOMIC STABILITY: FOOD INSECURITY: WITHIN THE PAST 12 MONTHS, THE FOOD YOU BOUGHT JUST DIDN'T LAST AND YOU DIDN'T HAVE MONEY TO GET MORE.: NEVER TRUE

## 2022-10-05 SDOH — ECONOMIC STABILITY: FOOD INSECURITY: WITHIN THE PAST 12 MONTHS, YOU WORRIED THAT YOUR FOOD WOULD RUN OUT BEFORE YOU GOT MONEY TO BUY MORE.: NEVER TRUE

## 2022-10-05 ASSESSMENT — PATIENT HEALTH QUESTIONNAIRE - PHQ9
1. LITTLE INTEREST OR PLEASURE IN DOING THINGS: 0
SUM OF ALL RESPONSES TO PHQ9 QUESTIONS 1 & 2: 0
SUM OF ALL RESPONSES TO PHQ QUESTIONS 1-9: 0
2. FEELING DOWN, DEPRESSED OR HOPELESS: 0
SUM OF ALL RESPONSES TO PHQ QUESTIONS 1-9: 0

## 2022-10-05 ASSESSMENT — SOCIAL DETERMINANTS OF HEALTH (SDOH): HOW HARD IS IT FOR YOU TO PAY FOR THE VERY BASICS LIKE FOOD, HOUSING, MEDICAL CARE, AND HEATING?: NOT HARD AT ALL

## 2022-10-05 NOTE — PROGRESS NOTES
[facility]      Date of Patient's Visit: 10/5/2022    Progress note    Patient Care Team:  Quentin Raman MD as PCP - General (Internal Medicine)  Quentin Raman [facility]  Aniyah Sher MD as Consulting Physician (Pulmonary Disease)      CHIEF COMPLAINT  Chief Complaint   Patient presents with    Hypertension     6 month f/u, medication refills pended    Back Pain     Getting worse, pt would like to get a injection again. Last one was 5 yrs ago       Jose C Nolasco is a 78 y.o. female who presents for worsening back pain. She cannot walk more than half a block   No falls   + h/o pain relief from intraarticular steroids   + relief when she wears heels       ROS  All other review of systems negative, except for those noted. Review of Systems    Past Medical History:   Diagnosis Date    Arthritis     Diabetes mellitus ([address]     Hyperlipidemia 11/9/2016    Hypertension     Hypokalemia with normal acid-base balance 9/13/2016    Pneumonia due to COVID-19 virus 12/25/2020    Type 2 diabetes mellitus with hyperosmolarity not at goal [facility]) 9/13/2016       Past Surgical History:   Procedure Laterality Date    EYE SURGERY      HYSTERECTOMY (CERVIX STATUS UNKNOWN)      HYSTERECTOMY, TOTAL ABDOMINAL (CERVIX REMOVED)      TUBAL LIGATION         No family history on file.     Social History     Socioeconomic History    Marital status:      Spouse name: None    Number of children: None    Years of education: None    Highest education level: None   Tobacco Use    Smoking status: Never    Smokeless tobacco: Never   Vaping Use    Vaping Use: Never used   Substance and Sexual Activity    Alcohol use: No    Drug use: No     Social Determinants of Health     Financial Resource Strain: Low Risk     Difficulty of Paying Living Expenses: Not hard at all   Food Insecurity: No Food Insecurity    Worried About [address] in the Last Year: Never true    Ran Out of Food in the Last Year: Never true       Current Outpatient Medications   Medication Sig Dispense Refill    lisinopril (PRINIVIL;ZESTRIL) 20 MG tablet take 1 tablet by mouth once daily 90 tablet 3    insulin glargine (LANTUS SOLOSTAR) 100 UNIT/ML injection pen Take 8 units in morning since November 2021, fasting blood sugars typically around 130 5 pen 3    aspirin 81 MG chewable tablet Take 1 tablet by mouth daily 90 tablet 3    metoprolol succinate (TOPROL XL) 25 MG extended release tablet Take 1 tablet by mouth daily 90 tablet 3    rosuvastatin (CRESTOR) 40 MG tablet Take 1 tablet by mouth nightly 90 tablet 3    amLODIPine (NORVASC) 10 MG tablet take 1 tablet by mouth once daily 30 tablet 11    Omega-3 Fatty Acids (FISH OIL) 1000 MG CAPS Take 1 capsule by mouth 3 times daily (Patient taking differently: Take 1,000 mg by mouth 2 times daily) 90 capsule 11    Cholecalciferol (VITAMIN D3) 25 MCG (1000 UT) TABS Take 1 tablet by mouth daily 90 tablet 1    Blood Pressure Monitoring (B-D ASSURE BPM/AUTO ARM CUFF) MISC 1 each by Does not apply route daily (Patient not taking: Reported on 1/7/2022) 1 each 0     No current facility-administered medications for this visit. Allergies   Allergen Reactions    Atorvastatin      Severe myopathy    Codeine        PHYSICAL EXAM:   Vital Signs:   BP (!) 144/72   Pulse 72   Temp 97.3 °F (36.3 °C) (Infrared)   Ht 5' 4\" (1.626 m)   Wt 206 lb (93.4 kg)   SpO2 98%   BMI 35.36 kg/m²     BP Readings from Last 3 Encounters:   10/05/22 (!) 144/72   08/15/22 (!) 154/97   03/29/22 (!) 159/76        Wt Readings from Last 3 Encounters:   10/05/22 206 lb (93.4 kg)   08/15/22 188 lb (85.3 kg)   03/29/22 189 lb (85.7 kg)       Physical Exam  Constitutional:       Appearance: She is obese. Cardiovascular:      Rate and Rhythm: Normal rate and regular rhythm. Pulses: Normal pulses. Heart sounds: Normal heart sounds.    Pulmonary:      Effort: Pulmonary effort is normal.      Breath sounds: Normal breath sounds. Abdominal:      General: Abdomen is flat. Musculoskeletal:         General: Normal range of motion. Skin:     General: Skin is warm. Neurological:      General: No focal deficit present. Mental Status: She is alert. Psychiatric:         Mood and Affect: Mood normal.         Behavior: Behavior normal.       Body mass index is 35.36 kg/m². RESULTS    Lab Findings    CBC:   Lab Results   Component Value Date/Time    WBC 6.8 03/30/2022 09:13 AM    HGB 12.7 03/30/2022 09:13 AM     03/30/2022 09:13 AM     BMP:   Lab Results   Component Value Date/Time     03/30/2022 09:13 AM    K 4.6 03/30/2022 09:13 AM     03/30/2022 09:13 AM    CO2 26 03/30/2022 09:13 AM    BUN 25 03/30/2022 09:13 AM    CREATININE 1.15 03/30/2022 09:13 AM    GLUCOSE 121 03/30/2022 09:13 AM     HEMOGLOBIN A1C:   Lab Results   Component Value Date/Time    LABA1C 6.9 03/29/2022 12:11 PM     MICROALBUMIN URINE:   Lab Results   Component Value Date/Time    MICROALBUR <12 06/19/2018 05:59 PM     FASTING LIPID PANEL:   Lab Results   Component Value Date    CHOL 206 (H) 11/15/2021    HDL 46 11/15/2021    TRIG 119 11/15/2021     Lab Results   Component Value Date    LDLCHOLESTEROL 136 (H) 11/15/2021     LIVER PROFILE:   Lab Results   Component Value Date/Time    ALT 8 12/25/2020 07:22 AM    AST 21 12/25/2020 07:22 AM    PROT 7.3 12/25/2020 07:22 AM    BILITOT 0.26 12/25/2020 07:22 AM    BILIDIR 0.09 12/25/2020 07:22 AM    LABALBU 3.0 12/27/2020 05:29 AM      THYROID FUNCTION: No results found for: T4, TSH   URINE ANALYSIS: No results found for: [address]    1. Chronic bilateral low back pain without sciatica    - JANE - Gordon Tijerina MD, Orthopedic Surgery, [place]    2. Type 2 diabetes mellitus with other circulatory complication, with long-term current use of insulin (HCC)  Continue same dose of insulin    3.  Hypertension, unspecified type  Continue same dose of antihypertensives    4. Hip pain   Dr Fabien Fox for evaluation            Follow up Instructions:    Return in about 6 months (around 4/5/2023). Reviewed prior labs and health maintenance. Discussed use, benefit, and side effects of prescribed medications. Barriers to medication compliance addressed. All patient questions answered. Pt voiced understanding.      Patient given educational materials - see patient instructions      MD RAVEN Reza  Attending Physician, [facility]   Faculty, Internal Medicine Residency Program  [facility]  10/5/2022, 11:21 AM

## 2022-11-05 DIAGNOSIS — I10 UNCONTROLLED HYPERTENSION: ICD-10-CM

## 2022-11-07 RX ORDER — AMLODIPINE BESYLATE 10 MG/1
TABLET ORAL
Qty: 30 TABLET | Refills: 11 | Status: SHIPPED | OUTPATIENT
Start: 2022-11-07

## 2022-11-07 NOTE — TELEPHONE ENCOUNTER
Request for amlodpine. Patient on wait list for 4/2023 appointment. Next Visit Date:  No future appointments. Health Maintenance   Topic Date Due    Lipids  11/15/2022    DTaP/Tdap/Td vaccine (1 - Tdap) 03/29/2023 (Originally 1/28/2006)    Shingles vaccine (2 of 3) 03/29/2023 (Originally 2/26/2011)    COVID-19 Vaccine (5 - Booster for Moderna series) 11/15/2022    Annual Wellness Visit (AWV)  01/08/2023    Depression Screen  10/05/2023    DEXA (modify frequency per FRAX score)  Completed    Flu vaccine  Completed    Pneumococcal 65+ years Vaccine  Completed    Hepatitis A vaccine  Aged Out    Hib vaccine  Aged Out    Meningococcal (ACWY) vaccine  Aged Out       Hemoglobin A1C (%)   Date Value   03/29/2022 6.9   11/15/2021 7.4   07/08/2021 8.3             ( goal A1C is < 7)   Microalb/Crt.  Ratio (mcg/mg creat)   Date Value   06/19/2018 CANNOT BE CALCULATED     LDL Cholesterol (mg/dL)   Date Value   11/15/2021 136 (H)       (goal LDL is <100)   AST (U/L)   Date Value   12/25/2020 21     ALT (U/L)   Date Value   12/25/2020 8     BUN (mg/dL)   Date Value   03/30/2022 25 (H)     BP Readings from Last 3 Encounters:   10/05/22 (!) 144/72   08/15/22 (!) 154/97   03/29/22 (!) 159/76          (goal 120/80)    All Future Testing planned in [facility]  Lab Frequency Next Occurrence         Patient Active Problem List:     Type 2 diabetes mellitus with hyperglycemia, without long-term current use of insulin (HCC)     Astigmatism, regular     Background diabetic retinopathy ([address]     Essential hypertension     Eczema     Generalized osteoarthritis     Hyperlipidemia     Hypertensive retinopathy of both eyes     Nonsmoker     Pseudophakia     Systolic murmur     Temporary cerebral vascular dysfunction     Pneumonia due to COVID-19 virus     Acute hypoxemic respiratory failure (HCC)     Elevated C-reactive protein (CRP)

## 2022-11-18 RX ORDER — METOPROLOL SUCCINATE 25 MG/1
TABLET, EXTENDED RELEASE ORAL
Qty: 90 TABLET | Refills: 3 | Status: SHIPPED | OUTPATIENT
Start: 2022-11-18

## 2023-06-01 ENCOUNTER — HOSPITAL ENCOUNTER (EMERGENCY)
Age: 83
Discharge: HOME | End: 2023-06-01
Attending: EMERGENCY MEDICINE
Payer: MEDICARE

## 2023-06-01 VITALS
DIASTOLIC BLOOD PRESSURE: 82 MMHG | RESPIRATION RATE: 16 BRPM | WEIGHT: 189 LBS | TEMPERATURE: 97.3 F | HEART RATE: 82 BPM | SYSTOLIC BLOOD PRESSURE: 134 MMHG | BODY MASS INDEX: 32.44 KG/M2 | OXYGEN SATURATION: 99 %

## 2023-06-01 DIAGNOSIS — M62.838 SPASM OF MUSCLE: Primary | ICD-10-CM

## 2023-06-01 LAB
ANION GAP SERPL CALCULATED.3IONS-SCNC: 9 MMOL/L (ref 9–17)
BASOPHILS # BLD: <0.03 K/UL (ref 0–0.2)
BASOPHILS NFR BLD: 0 % (ref 0–2)
BUN SERPL-MCNC: 16 MG/DL (ref 8–23)
CALCIUM SERPL-MCNC: 9.5 MG/DL (ref 8.6–10.4)
CHLORIDE SERPL-SCNC: 104 MMOL/L (ref 98–107)
CO2 SERPL-SCNC: 24 MMOL/L (ref 20–31)
CREAT SERPL-MCNC: 0.84 MG/DL (ref 0.5–0.9)
EOSINOPHIL # BLD: 0.27 K/UL (ref 0–0.44)
EOSINOPHILS RELATIVE PERCENT: 4 % (ref 1–4)
ERYTHROCYTE [DISTWIDTH] IN BLOOD BY AUTOMATED COUNT: 16.5 % (ref 11.8–14.4)
GFR SERPL CREATININE-BSD FRML MDRD: >60 ML/MIN/1.73M2
GLUCOSE SERPL-MCNC: 215 MG/DL (ref 70–99)
HCT VFR BLD AUTO: 43.1 % (ref 36.3–47.1)
HGB BLD-MCNC: 14.1 G/DL (ref 11.9–15.1)
IMM GRANULOCYTES # BLD AUTO: <0.03 K/UL (ref 0–0.3)
IMM GRANULOCYTES NFR BLD: 0 %
LYMPHOCYTES # BLD: 35 % (ref 24–43)
LYMPHOCYTES NFR BLD: 2.57 K/UL (ref 1.1–3.7)
MAGNESIUM SERPL-MCNC: 2.1 MG/DL (ref 1.6–2.6)
MCH RBC QN AUTO: 28.5 PG (ref 25.2–33.5)
MCHC RBC AUTO-ENTMCNC: 32.7 G/DL (ref 28.4–34.8)
MCV RBC AUTO: 87.2 FL (ref 82.6–102.9)
MONOCYTES NFR BLD: 0.33 K/UL (ref 0.1–1.2)
MONOCYTES NFR BLD: 5 % (ref 3–12)
NEUTROPHILS NFR BLD: 56 % (ref 36–65)
NEUTS SEG NFR BLD: 4.06 K/UL (ref 1.5–8.1)
NRBC AUTOMATED: 0 PER 100 WBC
PLATELET # BLD AUTO: ABNORMAL K/UL (ref 138–453)
PLATELET, FLUORESCENCE: NORMAL K/UL (ref 138–453)
POTASSIUM SERPL-SCNC: 4.1 MMOL/L (ref 3.7–5.3)
RBC # BLD AUTO: 4.94 M/UL (ref 3.95–5.11)
RBC # BLD: ABNORMAL 10*6/UL
REASON FOR REJECTION: NORMAL
SODIUM SERPL-SCNC: 137 MMOL/L (ref 135–144)
SPECIMEN SOURCE: NORMAL
WBC OTHER # BLD: 7.3 K/UL (ref 3.5–11.3)
ZZ NTE CLEAN UP: ORDERED TEST: NORMAL

## 2023-06-01 PROCEDURE — 85027 COMPLETE CBC AUTOMATED: CPT

## 2023-06-01 PROCEDURE — 99283 EMERGENCY DEPT VISIT LOW MDM: CPT

## 2023-06-01 PROCEDURE — 80048 BASIC METABOLIC PNL TOTAL CA: CPT

## 2023-06-01 PROCEDURE — 6370000000 HC RX 637 (ALT 250 FOR IP): Performed by: STUDENT IN AN ORGANIZED HEALTH CARE EDUCATION/TRAINING PROGRAM

## 2023-06-01 PROCEDURE — 83735 ASSAY OF MAGNESIUM: CPT

## 2023-06-01 PROCEDURE — 85055 RETICULATED PLATELET ASSAY: CPT

## 2023-06-01 RX ORDER — ACETAMINOPHEN 325 MG/1
650 TABLET ORAL ONCE
Status: COMPLETED | OUTPATIENT
Start: 2023-06-01 | End: 2023-06-01

## 2023-06-01 RX ORDER — IBUPROFEN 800 MG/1
800 TABLET ORAL ONCE
Status: COMPLETED | OUTPATIENT
Start: 2023-06-01 | End: 2023-06-01

## 2023-06-01 RX ADMIN — ACETAMINOPHEN 650 MG: 325 TABLET ORAL at 05:05

## 2023-06-01 RX ADMIN — IBUPROFEN 800 MG: 800 TABLET, FILM COATED ORAL at 05:05

## 2023-06-01 ASSESSMENT — ENCOUNTER SYMPTOMS
TROUBLE SWALLOWING: 0
COLOR CHANGE: 0
SHORTNESS OF BREATH: 0
CONSTIPATION: 0
SINUS PAIN: 0
DIARRHEA: 0
SORE THROAT: 0
VOMITING: 0
NAUSEA: 0
COUGH: 0
BACK PAIN: 0
ABDOMINAL PAIN: 0
SINUS PRESSURE: 0
CHEST TIGHTNESS: 0

## 2023-06-01 ASSESSMENT — PAIN SCALES - GENERAL
PAINLEVEL_OUTOF10: 5
PAINLEVEL_OUTOF10: 2

## 2023-06-01 ASSESSMENT — PAIN - FUNCTIONAL ASSESSMENT: PAIN_FUNCTIONAL_ASSESSMENT: 0-10

## 2023-06-01 NOTE — ED PROVIDER NOTES
[facility]     Emergency Department     Faculty Attestation    I performed a history and physical examination of the patient and discussed management with the resident. I have reviewed and agree with the residents findings including all diagnostic interpretations, and treatment plans as written. Any areas of disagreement are noted on the chart. I was personally present for the key portions of any procedures. I have documented in the chart those procedures where I was not present during the key portions. I have reviewed the emergency nurses triage note. I agree with the chief complaint, past medical history, past surgical history, allergies, medications, social and family history as documented unless otherwise noted below. Documentation of the HPI, Physical Exam and Medical Decision Making performed by scribsapphire is based on my personal performance of the HPI, PE and MDM. For Physician Assistant/ Nurse Practitioner cases/documentation I have personally evaluated this patient and have completed at least one if not all key elements of the E/M (history, physical exam, and MDM). Additional findings are as noted. Note Started: 3:24 AM EDT     77 yo F c/o L distal thigh spasm, no fever, no injury or fall, no sob or cp,   Pt notes spasm for 2 days,   PE vss, L thigh no deformity, non tender, compartments soft, skin intact, neurovascularly intact left lower extremity, no calf tenderness, no calf swelling,    -lab stable, s/s improved,     EKG Interpretation    Interpreted by me      CRITICAL CARE: There was a high probability of clinically significant/life threatening deterioration in this patient's condition which required my urgent intervention. Total critical care time was 0 minutes. This excludes any time for separately reportable procedures.        Jung Rush, DO  06/01/23 350 Olivier Busch, DO  06/01/23 [id number]

## 2023-06-01 NOTE — ED PROVIDER NOTES
[address]  ED  Emergency Department Encounter  Emergency Medicine Resident     Pt Name:Ginny Galloway  MRN: [id number]  [place] 1940  Date of evaluation: 6/1/23  PCP:  Lai Uriostegui MD  Note Started: 3:22 AM EDT      CHIEF COMPLAINT       Chief Complaint   Patient presents with    Spasms     Left thigh started on Wednesday        HISTORY OF PRESENT ILLNESS  (Location/Symptom, Timing/Onset, Context/Setting, Quality, Duration, Modifying Factors, Severity.)      Deb Mejias is a 78 y.o. female who presents with left thigh spasms that have been ongoing since Wednesday. Patient states these are worsened nighttime when she is lying down. She states it improved with Tylenol and heating pad. Patient states this evening she forgot to take Tylenol and the pain was bothering her more so she decided come to the department. She denies any falls or injuries. Patient states never had anything like this previously. She has been eating drinking normally, denies any recent illness. Patient states she has history of diabetes, hypertension and coronary artery disease. Denies any history of blood clots, denies any calf pain or swelling. PAST MEDICAL / SURGICAL / SOCIAL / FAMILY HISTORY      has a past medical history of Arthritis, Diabetes mellitus ([address] Hyperlipidemia, Hypertension, Hypokalemia with normal acid-base balance, Pneumonia due to COVID-19 virus, and Type 2 diabetes mellitus with hyperosmolarity not at goal [facility]). has a past surgical history that includes Hysterectomy; Hysterectomy, total abdominal; Tubal ligation; and eye surgery.       Social History     Socioeconomic History    Marital status:      Spouse name: Not on file    Number of children: Not on file    Years of education: Not on file    Highest education level: Not on file   Occupational History    Not on file   Tobacco Use    Smoking status: Never    Smokeless tobacco: Never   Vaping Use    Vaping Use:

## 2023-06-01 NOTE — ED TRIAGE NOTES
77 yo female arrived to ED with co left thigh pain/ spasms. Which started on Wednesday. Pt denies falls and/or injuries.

## 2023-06-01 NOTE — DISCHARGE INSTRUCTIONS
Thank you for visiting [facility] Emergency Department. You need to call Esau Starr MD to make an appointment as directed for follow up. You may take Tylenol and Motrin as needed for pain relief    Should you have any questions regarding your care or further treatment, please call [facility] Emergency Department at [phone].

## 2023-06-03 ENCOUNTER — HOSPITAL ENCOUNTER (EMERGENCY)
Age: 83
Discharge: HOME | End: 2023-06-03
Attending: EMERGENCY MEDICINE
Payer: MEDICARE

## 2023-06-03 VITALS
WEIGHT: 189.38 LBS | BODY MASS INDEX: 32.51 KG/M2 | DIASTOLIC BLOOD PRESSURE: 80 MMHG | HEART RATE: 78 BPM | TEMPERATURE: 97.9 F | SYSTOLIC BLOOD PRESSURE: 147 MMHG | OXYGEN SATURATION: 95 % | RESPIRATION RATE: 18 BRPM

## 2023-06-03 DIAGNOSIS — M62.838 SPASM OF MUSCLE: Primary | ICD-10-CM

## 2023-06-03 PROCEDURE — 99283 EMERGENCY DEPT VISIT LOW MDM: CPT

## 2023-06-03 PROCEDURE — 6370000000 HC RX 637 (ALT 250 FOR IP): Performed by: EMERGENCY MEDICINE

## 2023-06-03 RX ORDER — BACLOFEN 5 MG/1
5 TABLET ORAL 3 TIMES DAILY PRN
Qty: 12 TABLET | Refills: 0 | Status: SHIPPED | OUTPATIENT
Start: 2023-06-03 | End: 2023-06-05 | Stop reason: ALTCHOICE

## 2023-06-03 RX ORDER — BACLOFEN 5 MG/1
5 TABLET ORAL ONCE
Status: COMPLETED | OUTPATIENT
Start: 2023-06-03 | End: 2023-06-03

## 2023-06-03 RX ADMIN — BACLOFEN 5 MG: 5 TABLET ORAL at 08:11

## 2023-06-03 ASSESSMENT — ENCOUNTER SYMPTOMS
SHORTNESS OF BREATH: 0
ABDOMINAL PAIN: 0
VOMITING: 0

## 2023-06-03 ASSESSMENT — PAIN DESCRIPTION - DESCRIPTORS: DESCRIPTORS: ACHING

## 2023-06-03 ASSESSMENT — PAIN DESCRIPTION - PAIN TYPE: TYPE: ACUTE PAIN

## 2023-06-03 ASSESSMENT — PAIN DESCRIPTION - ONSET: ONSET: ON-GOING

## 2023-06-03 ASSESSMENT — PAIN - FUNCTIONAL ASSESSMENT: PAIN_FUNCTIONAL_ASSESSMENT: 0-10

## 2023-06-03 ASSESSMENT — PAIN SCALES - GENERAL: PAINLEVEL_OUTOF10: 6

## 2023-06-03 ASSESSMENT — PAIN DESCRIPTION - FREQUENCY: FREQUENCY: INTERMITTENT

## 2023-06-03 NOTE — ED NOTES
Pt resting on cot, a/o x4, RR even and NL. No distress noted, waiting on medication for discharge.  Family at bedside, call light within reach      [facility], RN  06/03/23 [id number]

## 2023-06-03 NOTE — ED PROVIDER NOTES
[facility]   Emergency Department  Faculty Attestation       I performed a history and physical examination of the patient and discussed management with the resident. I reviewed the residents note and agree with the documented findings including all diagnostic interpretations and plan of care. Any areas of disagreement are noted on the chart. I was personally present for the key portions of any procedures. I have documented in the chart those procedures where I was not present during the key portions. I have reviewed the emergency nurses triage note. I agree with the chief complaint, past medical history, past surgical history, allergies, medications, social and family history as documented unless otherwise noted below. For Physician Assistant/ Nurse Practitioner cases/documentation I have personally evaluated this patient and have completed at least one if not all key elements of the E/M (history, physical exam, and MDM). Additional findings are as noted. Patient Name: Prosper Romero  MRN: [id number]  : 1940  Primary Care Physician: Subhash Staley MD    Date of evaluationa: 6/3/2023   Note Started: 7:56 AM EDT    Pertinent Comments     Chief Complaint:   Chief Complaint   Patient presents with    Spasms     Left thigh. Initial vitals: (If not listed, please see nursing documentation)  ED Triage Vitals   BP Temp Temp Source Pulse Respirations SpO2 Height Weight - Scale   23 0603 23 0603 23 0603 23 0603 23 0603 23 0603 -- 23 0606   (!) 147/80 97.9 °F (36.6 °C) Oral 78 18 95 %  189 lb 6 oz (85.9 kg)        HPI/PE/Impression: This is a 78 y.o. female who presents to the Emergency Department left thigh spasm. Patient had been seen yesterday for similar complaint . She states after sitting for a period time or lying she will have worsening pain in her left side with spasm. No DVTs. No shortness of breath. No Sauber leg.   No trauma to

## 2023-06-03 NOTE — ED TRIAGE NOTES
79 yo female arrived to ED with c/o muscle spasm to left thigh. Pt denies any falls or injuries. Pt was seen in the ER a few days ago for similar symptoms.

## 2023-06-03 NOTE — ED PROVIDER NOTES
[facility] ED  Emergency Department Encounter  Emergency Medicine Resident     Pt Name:Ginny Justin  MRN: [id number]  [place] 1940  Date of evaluation: 6/3/23  PCP:  Eric Mata MD  Note Started: 6:52 AM EDT      CHIEF COMPLAINT       Chief Complaint   Patient presents with    Spasms     Left thigh. HISTORY OF PRESENT ILLNESS  (Location/Symptom, Timing/Onset, Context/Setting, Quality, Duration, Modifying Factors, Severity.)      Daya Ivan is a 78 y.o. female who presents with left thigh spasms since Tuesday. Patient states that she has been taking Tylenol for the spasms with some improvement however she is not able to get a good night sleep due to the spasms. Patient denies any other symptoms including fever, chest pain, shortness of breath, abdominal pain, vomiting. PAST MEDICAL / SURGICAL / SOCIAL / FAMILY HISTORY      has a past medical history of Arthritis, Diabetes mellitus ([address] Hyperlipidemia, Hypertension, Hypokalemia with normal acid-base balance, Pneumonia due to COVID-19 virus, and Type 2 diabetes mellitus with hyperosmolarity not at goal [facility]). has a past surgical history that includes Hysterectomy; Hysterectomy, total abdominal; Tubal ligation; and eye surgery.     Social History     Socioeconomic History    Marital status:      Spouse name: Not on file    Number of children: Not on file    Years of education: Not on file    Highest education level: Not on file   Occupational History    Not on file   Tobacco Use    Smoking status: Never    Smokeless tobacco: Never   Vaping Use    Vaping Use: Never used   Substance and Sexual Activity    Alcohol use: No    Drug use: No    Sexual activity: Not on file   Other Topics Concern    Not on file   Social History Narrative    Not on file     Social Determinants of Health     Financial Resource Strain: Low Risk     Difficulty of Paying Living Expenses: Not hard at all   Food Insecurity: No Food Insecurity

## 2023-06-03 NOTE — DISCHARGE INSTRUCTIONS
You were seen here for muscle spasms to your left thigh. You were given a prescription for baclofen. This is a muscle relaxer, only take this medicine if you are having muscle spasms. If you take this medication you cannot drive or operate heavy machinery. You need to call and schedule follow-up appointment with your primary care provider for soon as possible. Return to the emergency department immediately if you develop worsening symptoms or any other concerns.

## 2023-06-05 ENCOUNTER — TELEPHONE (OUTPATIENT)
Dept: INTERNAL MEDICINE | Age: 83
End: 2023-06-05

## 2023-06-05 DIAGNOSIS — M62.838 MUSCLE SPASM OF LEFT LOWER EXTREMITY: Primary | ICD-10-CM

## 2023-06-05 RX ORDER — CYCLOBENZAPRINE HCL 5 MG
5 TABLET ORAL 2 TIMES DAILY PRN
Qty: 10 TABLET | Refills: 0 | Status: SHIPPED | OUTPATIENT
Start: 2023-06-05 | End: 2023-06-10

## 2023-06-05 NOTE — TELEPHONE ENCOUNTER
Called patient after he received a health link the patient is experiencing left thigh muscle spasms. This has been ongoing for the past 5 days, patient visited ED twice for the same reason, was discharged on baclofen electrolytes level were normal, discussed the same with the patient. Patient tried baclofen overnight but it just works for short duration of time. Advised patient that will prescribe Flexeril, which is another type of muscle relaxant. Will send it to her pharmacy of choice. Advised patient not to take Flexeril with baclofen. Also advised patient not to drive and operate heavy machinery after taking muscle relaxants. If patient still does not get better after taking Flexeril, advised her to go to ED.

## 2023-06-06 ENCOUNTER — OFFICE VISIT (OUTPATIENT)
Dept: INTERNAL MEDICINE | Age: 83
End: 2023-06-06
Payer: MEDICARE

## 2023-06-06 VITALS
WEIGHT: 188.4 LBS | BODY MASS INDEX: 32.17 KG/M2 | OXYGEN SATURATION: 98 % | SYSTOLIC BLOOD PRESSURE: 136 MMHG | DIASTOLIC BLOOD PRESSURE: 75 MMHG | HEART RATE: 72 BPM | HEIGHT: 64 IN | TEMPERATURE: 98.1 F

## 2023-06-06 DIAGNOSIS — G89.29 CHRONIC BILATERAL LOW BACK PAIN WITHOUT SCIATICA: Primary | ICD-10-CM

## 2023-06-06 DIAGNOSIS — M54.50 CHRONIC BILATERAL LOW BACK PAIN WITHOUT SCIATICA: Primary | ICD-10-CM

## 2023-06-06 DIAGNOSIS — M62.838 MUSCLE SPASM OF LEFT LOWER EXTREMITY: ICD-10-CM

## 2023-06-06 PROCEDURE — G8417 CALC BMI ABV UP PARAM F/U: HCPCS

## 2023-06-06 PROCEDURE — 3074F SYST BP LT 130 MM HG: CPT

## 2023-06-06 PROCEDURE — 1036F TOBACCO NON-USER: CPT

## 2023-06-06 PROCEDURE — 3078F DIAST BP <80 MM HG: CPT

## 2023-06-06 PROCEDURE — 1090F PRES/ABSN URINE INCON ASSESS: CPT

## 2023-06-06 PROCEDURE — 1123F ACP DISCUSS/DSCN MKR DOCD: CPT

## 2023-06-06 PROCEDURE — G8427 DOCREV CUR MEDS BY ELIG CLIN: HCPCS

## 2023-06-06 PROCEDURE — 99213 OFFICE O/P EST LOW 20 MIN: CPT

## 2023-06-06 PROCEDURE — G8399 PT W/DXA RESULTS DOCUMENT: HCPCS

## 2023-06-06 RX ORDER — LIDOCAINE 50 MG/G
1 PATCH TOPICAL DAILY
Qty: 30 PATCH | Refills: 0 | Status: SHIPPED | OUTPATIENT
Start: 2023-06-06

## 2023-06-06 ASSESSMENT — ENCOUNTER SYMPTOMS
NAUSEA: 0
CONSTIPATION: 0
BACK PAIN: 1
WHEEZING: 0
VOMITING: 0
DIARRHEA: 0
SHORTNESS OF BREATH: 0
COUGH: 0
ABDOMINAL PAIN: 0

## 2023-06-06 NOTE — PROGRESS NOTES
Attending Physician Statement  I have discussed the care of Silvana December, including pertinent history and exam findings,  with the resident. I have reviewed the key elements of all parts of the encounter with the resident. I agree with the assessment, plan and orders as documented by the resident.   (GE Modifier)
to person, place, and time. Psychiatric:         Mood and Affect: Mood normal.         Behavior: Behavior normal.         DIAGNOSTIC FINDINGS:  CBC:  Lab Results   Component Value Date/Time    WBC 7.3 06/01/2023 04:03 AM    HGB 14.1 06/01/2023 04:03 AM    PLT See Reflexed IPF Result 06/01/2023 04:03 AM       BMP:    Lab Results   Component Value Date/Time     06/01/2023 04:59 AM    K 4.1 06/01/2023 04:59 AM     06/01/2023 04:59 AM    CO2 24 06/01/2023 04:59 AM    BUN 16 06/01/2023 04:59 AM    CREATININE 0.84 06/01/2023 04:59 AM    GLUCOSE 215 06/01/2023 04:59 AM       HEMOGLOBIN A1C:   Lab Results   Component Value Date/Time    LABA1C 6.9 03/29/2022 12:11 PM       FASTING LIPID PANEL:  Lab Results   Component Value Date    CHOL 206 (H) 11/15/2021    HDL 46 11/15/2021    TRIG 119 11/15/2021       ASSESSMENT AND PLAN:  Mj Batista was seen today for spasms. Diagnoses and all orders for this visit:    Chronic bilateral low back pain without sciatica  -     [facility] - [facility]  -     lidocaine (LIDODERM) 5 %; Place 1 patch onto the skin daily 12 hours on, 12 hours off. Muscle spasm of left lower extremity  -     [facility] - [facility]  -     lidocaine (LIDODERM) 5 %; Place 1 patch onto the skin daily 12 hours on, 12 hours off. Hold rosuvastatin for 2 weeks to rule out any kind of statin induced myopathy      FOLLOW UP AND INSTRUCTIONS:  Return in about 3 months (around 9/6/2023). Ginny received counseling on the following healthy behaviors: exercise    Discussed use, benefit, and side effects of prescribed medications. Barriers to medication compliance addressed. All patient questions answered. Pt voiced understanding. Patient given educational materials - see patient instructions    Francesca De La Torre MD  Internal Medicine Resident, PGY- [id number] [facility];  [place], [place]  6/6/2023, 12:17 PM      This note is created with the assistance of

## 2023-06-26 RX ORDER — INSULIN GLARGINE 100 [IU]/ML
INJECTION, SOLUTION SUBCUTANEOUS
Qty: 15 ML | Refills: 5 | Status: SHIPPED | OUTPATIENT
Start: 2023-06-26

## 2023-12-05 ENCOUNTER — HOSPITAL ENCOUNTER (EMERGENCY)
Age: 83
Discharge: HOME | End: 2023-12-05
Attending: EMERGENCY MEDICINE
Payer: MEDICARE

## 2023-12-05 ENCOUNTER — APPOINTMENT (OUTPATIENT)
Dept: GENERAL RADIOLOGY | Age: 83
End: 2023-12-05
Payer: MEDICARE

## 2023-12-05 VITALS
RESPIRATION RATE: 16 BRPM | HEART RATE: 88 BPM | TEMPERATURE: 98.2 F | WEIGHT: 185 LBS | BODY MASS INDEX: 31.76 KG/M2 | DIASTOLIC BLOOD PRESSURE: 69 MMHG | OXYGEN SATURATION: 98 % | SYSTOLIC BLOOD PRESSURE: 153 MMHG

## 2023-12-05 DIAGNOSIS — J18.9 PNEUMONIA DUE TO INFECTIOUS ORGANISM, UNSPECIFIED LATERALITY, UNSPECIFIED PART OF LUNG: Primary | ICD-10-CM

## 2023-12-05 LAB
FLUAV RNA RESP QL NAA+PROBE: NOT DETECTED
FLUBV RNA RESP QL NAA+PROBE: NOT DETECTED
SARS-COV-2 RNA RESP QL NAA+PROBE: NOT DETECTED
SOURCE: NORMAL
SPECIMEN DESCRIPTION: NORMAL

## 2023-12-05 PROCEDURE — 87636 SARSCOV2 & INF A&B AMP PRB: CPT

## 2023-12-05 PROCEDURE — 99284 EMERGENCY DEPT VISIT MOD MDM: CPT

## 2023-12-05 PROCEDURE — 6370000000 HC RX 637 (ALT 250 FOR IP): Performed by: EMERGENCY MEDICINE

## 2023-12-05 PROCEDURE — 71045 X-RAY EXAM CHEST 1 VIEW: CPT

## 2023-12-05 RX ORDER — BENZONATATE 100 MG/1
100 CAPSULE ORAL ONCE
Status: COMPLETED | OUTPATIENT
Start: 2023-12-05 | End: 2023-12-05

## 2023-12-05 RX ORDER — DOXYCYCLINE HYCLATE 100 MG
100 TABLET ORAL 2 TIMES DAILY
Qty: 14 TABLET | Refills: 0 | Status: SHIPPED | OUTPATIENT
Start: 2023-12-05 | End: 2023-12-12

## 2023-12-05 RX ADMIN — BENZONATATE 100 MG: 100 CAPSULE ORAL at 07:57

## 2023-12-05 ASSESSMENT — PAIN - FUNCTIONAL ASSESSMENT: PAIN_FUNCTIONAL_ASSESSMENT: NONE - DENIES PAIN

## 2023-12-05 NOTE — DISCHARGE INSTRUCTIONS
Keep yourself hydrated, use Tylenol and ibuprofen as needed for fevers and body aches. Take doxycycline twice per day for 7 days for possible mild pneumonia. If you develop significant difficulty breathing or any other concerning symptoms come back to the ER. Follow-up with your primary care doctor.

## 2023-12-06 NOTE — ED PROVIDER NOTES
EMERGENCY DEPARTMENT ENCOUNTER    Pt Name: Betito Lopez  MRN: [id number]  [address] [facility] [place] 1940  Date of evaluation: 12/6/23  CHIEF COMPLAINT       Chief Complaint   Patient presents with    Cough     HISTORY OF PRESENT ILLNESS   HPI  83F hx of diabetes, HTN presents to the ED for a cough for the past few days. Pt states it is occasionally productive. She denies SOB, CP, fever/chills or any other acute findings. REVIEW OF SYSTEMS     Review of Systems   All other systems reviewed and are negative.     PASTMEDICAL HISTORY     Past Medical History:   Diagnosis Date    Arthritis     Diabetes mellitus ([address])     Hyperlipidemia 11/9/2016    Hypertension     Hypokalemia with normal acid-base balance 9/13/2016    Pneumonia due to COVID-19 virus 12/25/2020    Type 2 diabetes mellitus with hyperosmolarity not at goal [facility]) 9/13/2016     SURGICAL HISTORY       Past Surgical History:   Procedure Laterality Date    EYE SURGERY      HYSTERECTOMY (CERVIX STATUS UNKNOWN)      HYSTERECTOMY, TOTAL ABDOMINAL (CERVIX REMOVED)      TUBAL LIGATION       CURRENT MEDICATIONS       Discharge Medication List as of 12/5/2023  8:50 AM        CONTINUE these medications which have NOT CHANGED    Details   insulin glargine (LANTUS SOLOSTAR) 100 UNIT/ML injection pen INJECT 8 units in morning since November 2021, fasting blood sugars typically around 130, Disp-15 mL, R-5Normal      lidocaine (LIDODERM) 5 % Place 1 patch onto the skin daily 12 hours on, 12 hours off., Disp-30 patch, R-0Normal      metoprolol succinate (TOPROL XL) 25 MG extended release tablet take 1 tablet by mouth once daily, Disp-90 tablet, R-3Normal      amLODIPine (NORVASC) 10 MG tablet take 1 tablet by mouth once daily, Disp-30 tablet, R-11Normal      lisinopril (PRINIVIL;ZESTRIL) 20 MG tablet take 1 tablet by mouth once daily, Disp-90 tablet, R-3Normal      aspirin 81 MG chewable tablet Take 1 tablet by mouth daily, Disp-90 tablet, R-3Normal      rosuvastatin (CRESTOR) 40 MG

## 2023-12-26 NOTE — TELEPHONE ENCOUNTER
A Refill Has Been Requested for [employer]    Medication Requested  Requested Prescriptions     Pending Prescriptions Disp Refills    metoprolol succinate (TOPROL XL) 25 MG extended release tablet [Pharmacy Med Name: METOPROLOL SUCC ER 25 MG TAB] 90 tablet 3     Sig: take 1 tablet by mouth once daily       Last Visit Date (If Applicable)  02/6/7490    Next Visit Date (If Applicable)  Visit date not found

## 2023-12-27 RX ORDER — METOPROLOL SUCCINATE 25 MG/1
TABLET, EXTENDED RELEASE ORAL
Qty: 90 TABLET | Refills: 3 | Status: SHIPPED | OUTPATIENT
Start: 2023-12-27

## 2024-04-23 ENCOUNTER — OFFICE VISIT (OUTPATIENT)
Dept: INTERNAL MEDICINE | Age: 84
End: 2024-04-23
Payer: MEDICARE

## 2024-04-23 VITALS
BODY MASS INDEX: 33.67 KG/M2 | SYSTOLIC BLOOD PRESSURE: 110 MMHG | HEIGHT: 64 IN | OXYGEN SATURATION: 99 % | WEIGHT: 197.2 LBS | DIASTOLIC BLOOD PRESSURE: 92 MMHG | HEART RATE: 75 BPM | TEMPERATURE: 98.1 F

## 2024-04-23 DIAGNOSIS — M62.838 MUSCLE SPASM OF LEFT LOWER EXTREMITY: ICD-10-CM

## 2024-04-23 DIAGNOSIS — E11.59 TYPE 2 DIABETES MELLITUS WITH OTHER CIRCULATORY COMPLICATION, WITH LONG-TERM CURRENT USE OF INSULIN (HCC): Primary | ICD-10-CM

## 2024-04-23 DIAGNOSIS — M54.50 CHRONIC BILATERAL LOW BACK PAIN WITHOUT SCIATICA: ICD-10-CM

## 2024-04-23 DIAGNOSIS — G89.29 CHRONIC BILATERAL LOW BACK PAIN WITHOUT SCIATICA: ICD-10-CM

## 2024-04-23 DIAGNOSIS — J96.01 ACUTE HYPOXEMIC RESPIRATORY FAILURE (HCC): ICD-10-CM

## 2024-04-23 DIAGNOSIS — Z79.4 TYPE 2 DIABETES MELLITUS WITH OTHER CIRCULATORY COMPLICATION, WITH LONG-TERM CURRENT USE OF INSULIN (HCC): Primary | ICD-10-CM

## 2024-04-23 DIAGNOSIS — E11.65 HYPERGLYCEMIA DUE TO DIABETES MELLITUS (HCC): ICD-10-CM

## 2024-04-23 LAB
CHP ED QC CHECK: YES
GLUCOSE BLD-MCNC: 223 MG/DL
HBA1C MFR BLD: 9.5 %

## 2024-04-23 PROCEDURE — 3080F DIAST BP >= 90 MM HG: CPT | Performed by: INTERNAL MEDICINE

## 2024-04-23 PROCEDURE — 3074F SYST BP LT 130 MM HG: CPT | Performed by: INTERNAL MEDICINE

## 2024-04-23 PROCEDURE — G8427 DOCREV CUR MEDS BY ELIG CLIN: HCPCS | Performed by: INTERNAL MEDICINE

## 2024-04-23 PROCEDURE — G8417 CALC BMI ABV UP PARAM F/U: HCPCS | Performed by: INTERNAL MEDICINE

## 2024-04-23 PROCEDURE — 82962 GLUCOSE BLOOD TEST: CPT | Performed by: INTERNAL MEDICINE

## 2024-04-23 PROCEDURE — 83036 HEMOGLOBIN GLYCOSYLATED A1C: CPT | Performed by: INTERNAL MEDICINE

## 2024-04-23 PROCEDURE — 99214 OFFICE O/P EST MOD 30 MIN: CPT | Performed by: INTERNAL MEDICINE

## 2024-04-23 PROCEDURE — 1090F PRES/ABSN URINE INCON ASSESS: CPT | Performed by: INTERNAL MEDICINE

## 2024-04-23 PROCEDURE — G8399 PT W/DXA RESULTS DOCUMENT: HCPCS | Performed by: INTERNAL MEDICINE

## 2024-04-23 PROCEDURE — 1036F TOBACCO NON-USER: CPT | Performed by: INTERNAL MEDICINE

## 2024-04-23 PROCEDURE — 1123F ACP DISCUSS/DSCN MKR DOCD: CPT | Performed by: INTERNAL MEDICINE

## 2024-04-23 PROCEDURE — 3046F HEMOGLOBIN A1C LEVEL >9.0%: CPT | Performed by: INTERNAL MEDICINE

## 2024-04-23 RX ORDER — LISINOPRIL 20 MG/1
TABLET ORAL
Qty: 90 TABLET | Refills: 3 | Status: SHIPPED | OUTPATIENT
Start: 2024-04-23

## 2024-04-23 RX ORDER — INSULIN GLARGINE 100 [IU]/ML
10 INJECTION, SOLUTION SUBCUTANEOUS NIGHTLY
Qty: 5 ADJUSTABLE DOSE PRE-FILLED PEN SYRINGE | Refills: 0 | Status: SHIPPED | OUTPATIENT
Start: 2024-04-23

## 2024-04-23 SDOH — ECONOMIC STABILITY: FOOD INSECURITY: WITHIN THE PAST 12 MONTHS, YOU WORRIED THAT YOUR FOOD WOULD RUN OUT BEFORE YOU GOT MONEY TO BUY MORE.: NEVER TRUE

## 2024-04-23 SDOH — ECONOMIC STABILITY: HOUSING INSECURITY
IN THE LAST 12 MONTHS, WAS THERE A TIME WHEN YOU DID NOT HAVE A STEADY PLACE TO SLEEP OR SLEPT IN A SHELTER (INCLUDING NOW)?: NO

## 2024-04-23 SDOH — HEALTH STABILITY: PHYSICAL HEALTH: ON AVERAGE, HOW MANY DAYS PER WEEK DO YOU ENGAGE IN MODERATE TO STRENUOUS EXERCISE (LIKE A BRISK WALK)?: 7 DAYS

## 2024-04-23 SDOH — ECONOMIC STABILITY: FOOD INSECURITY: WITHIN THE PAST 12 MONTHS, THE FOOD YOU BOUGHT JUST DIDN'T LAST AND YOU DIDN'T HAVE MONEY TO GET MORE.: NEVER TRUE

## 2024-04-23 SDOH — HEALTH STABILITY: PHYSICAL HEALTH: ON AVERAGE, HOW MANY MINUTES DO YOU ENGAGE IN EXERCISE AT THIS LEVEL?: 60 MIN

## 2024-04-23 SDOH — ECONOMIC STABILITY: INCOME INSECURITY: HOW HARD IS IT FOR YOU TO PAY FOR THE VERY BASICS LIKE FOOD, HOUSING, MEDICAL CARE, AND HEATING?: NOT HARD AT ALL

## 2024-04-23 ASSESSMENT — SOCIAL DETERMINANTS OF HEALTH (SDOH)
IN A TYPICAL WEEK, HOW MANY TIMES DO YOU TALK ON THE PHONE WITH FAMILY, FRIENDS, OR NEIGHBORS?: MORE THAN THREE TIMES A WEEK
WITHIN THE LAST YEAR, HAVE YOU BEEN AFRAID OF YOUR PARTNER OR EX-PARTNER?: NO
HOW OFTEN DO YOU ATTENT MEETINGS OF THE CLUB OR ORGANIZATION YOU BELONG TO?: MORE THAN 4 TIMES PER YEAR
HOW OFTEN DO YOU ATTEND CHURCH OR RELIGIOUS SERVICES?: MORE THAN 4 TIMES PER YEAR
DO YOU BELONG TO ANY CLUBS OR ORGANIZATIONS SUCH AS CHURCH GROUPS UNIONS, FRATERNAL OR ATHLETIC GROUPS, OR SCHOOL GROUPS?: YES
WITHIN THE LAST YEAR, HAVE YOU BEEN KICKED, HIT, SLAPPED, OR OTHERWISE PHYSICALLY HURT BY YOUR PARTNER OR EX-PARTNER?: NO
WITHIN THE LAST YEAR, HAVE YOU BEEN HUMILIATED OR EMOTIONALLY ABUSED IN OTHER WAYS BY YOUR PARTNER OR EX-PARTNER?: NO
WITHIN THE LAST YEAR, HAVE TO BEEN RAPED OR FORCED TO HAVE ANY KIND OF SEXUAL ACTIVITY BY YOUR PARTNER OR EX-PARTNER?: NO
HOW OFTEN DO YOU GET TOGETHER WITH FRIENDS OR RELATIVES?: MORE THAN THREE TIMES A WEEK

## 2024-04-23 ASSESSMENT — PATIENT HEALTH QUESTIONNAIRE - PHQ9
1. LITTLE INTEREST OR PLEASURE IN DOING THINGS: NOT AT ALL
SUM OF ALL RESPONSES TO PHQ QUESTIONS 1-9: 0
SUM OF ALL RESPONSES TO PHQ QUESTIONS 1-9: 0
2. FEELING DOWN, DEPRESSED OR HOPELESS: NOT AT ALL
SUM OF ALL RESPONSES TO PHQ QUESTIONS 1-9: 0
SUM OF ALL RESPONSES TO PHQ9 QUESTIONS 1 & 2: 0
SUM OF ALL RESPONSES TO PHQ QUESTIONS 1-9: 0

## 2024-04-23 ASSESSMENT — LIFESTYLE VARIABLES
HOW MANY STANDARD DRINKS CONTAINING ALCOHOL DO YOU HAVE ON A TYPICAL DAY: PATIENT DOES NOT DRINK
HOW OFTEN DO YOU HAVE A DRINK CONTAINING ALCOHOL: NEVER

## 2024-04-23 NOTE — PROGRESS NOTES
(VOLTAREN) 1 % GEL; Apply 4 g topically 4 times daily  Dispense: 350 g; Refill: 0    4. Muscle spasm of left lower extremity      5. Hyperglycemia due to diabetes mellitus (HCC) :   Increase the dose of basaglar to 10 units   Monitor carbohydrate intake   If FBS remains more than 170 mg/dl , increase the basaglar to 12 untis           time spent in teaching stretching exercises for sciatica   Avoid prolonged sitting while sewing. Encourage frequent breaks and walking around every 30 min   No indications for neurosurgery   Gait tested , no indications for a cane or walker   Lidocaine patch is not covered by her insurance , a good rx coupon provided for diclofenac gel      Follow up Instructions:    Return in about 3 months (around 7/23/2024).    Reviewed prior labs and health maintenance.      Discussed use, benefit, and side effects of prescribed medications.  Barriers to medication compliance addressed.  All patient questions answered.  Pt voiced understanding.     Patient given educational materials - see patient instructions      MD RAVEN Teresa  Attending Physician, [facility]   Faculty, Internal Medicine Residency Program  [facility] Physician - [place]  4/23/2024, 4:17 PM    
< from: 12 Lead ECG (07.15.20 @ 12:00) >      QTC Calculation(Bezet) 452 ms    < end of copied text >

## 2024-11-05 ENCOUNTER — TELEPHONE (OUTPATIENT)
Dept: INTERNAL MEDICINE | Age: 84
End: 2024-11-05

## 2024-11-05 NOTE — TELEPHONE ENCOUNTER
PC to pt to schedule AWV, no answer. Left HIPAA compliant message identifying self and nature of call, requested call back to writer, phone number given.

## 2024-12-05 ENCOUNTER — TELEPHONE (OUTPATIENT)
Dept: INTERNAL MEDICINE | Age: 84
End: 2024-12-05

## 2025-03-13 ENCOUNTER — TRANSCRIBE ORDERS (OUTPATIENT)
Dept: ADMINISTRATIVE | Age: 85
End: 2025-03-13

## 2025-03-13 DIAGNOSIS — Z78.0 MENOPAUSE: Primary | ICD-10-CM

## 2025-05-05 NOTE — PATIENT INSTRUCTIONS
Your script for tetanus vaccine has been printed and given to you, you can take it to the pharmacy of your choice. Your medications for this visit were escribed to your preferred pharmacy. You have been given an order and instructions for a Dexa Scan. The order was faxed to the scheduling department and they will contact you with an appointment. Please bring order with you to that appointment. The scheduling number is [phone] if you have scheduling problems. Avs was given and reviewed appt card given with next appt.  MM
Ask for help if needed

## 2025-07-15 RX ORDER — PEN NEEDLE, DIABETIC 32GX 5/32"
NEEDLE, DISPOSABLE MISCELLANEOUS
Qty: 100 EACH | Refills: 3 | Status: SHIPPED | OUTPATIENT
Start: 2025-07-15

## 2025-07-15 NOTE — TELEPHONE ENCOUNTER
Ginny Goodman is calling to request a refill on the following medication(s):    Medication Request:  Requested Prescriptions     Pending Prescriptions Disp Refills    DROPLET PEN NEEDLES 32G X 4 MM MISC [Pharmacy Med Name: DROPLET PEN NEEDLE 32G 4MM] 100 each 3     Sig: INJECT ONCE A DAY       Last Visit Date (If Applicable):  Visit date not found    Next Visit Date:    Visit date not found

## 2025-08-07 ENCOUNTER — HOSPITAL ENCOUNTER (OUTPATIENT)
Dept: GENERAL RADIOLOGY | Age: 85
Discharge: HOME | End: 2025-08-09
Payer: MEDICARE

## 2025-08-07 DIAGNOSIS — Z78.0 MENOPAUSE: ICD-10-CM
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
## 2025-08-07 DIAGNOSIS — M54.50 LUMBAR BACK PAIN: ICD-10-CM

## 2025-08-07 PROCEDURE — 73521 X-RAY EXAM HIPS BI 2 VIEWS: CPT

## 2025-08-07 PROCEDURE — 72100 X-RAY EXAM L-S SPINE 2/3 VWS: CPT

## 2025-08-07 PROCEDURE — 72220 X-RAY EXAM SACRUM TAILBONE: CPT
